# Patient Record
Sex: FEMALE | Race: ASIAN | NOT HISPANIC OR LATINO | URBAN - METROPOLITAN AREA
[De-identification: names, ages, dates, MRNs, and addresses within clinical notes are randomized per-mention and may not be internally consistent; named-entity substitution may affect disease eponyms.]

---

## 2020-11-04 ENCOUNTER — EMERGENCY (EMERGENCY)
Facility: HOSPITAL | Age: 60
LOS: 1 days | Discharge: HOME | End: 2020-11-04
Attending: EMERGENCY MEDICINE | Admitting: EMERGENCY MEDICINE
Payer: MEDICAID

## 2020-11-04 VITALS
DIASTOLIC BLOOD PRESSURE: 75 MMHG | OXYGEN SATURATION: 100 % | RESPIRATION RATE: 16 BRPM | SYSTOLIC BLOOD PRESSURE: 136 MMHG | TEMPERATURE: 100 F | HEART RATE: 77 BPM

## 2020-11-04 PROCEDURE — 99282 EMERGENCY DEPT VISIT SF MDM: CPT

## 2020-11-04 NOTE — ED PROVIDER NOTE - OBJECTIVE STATEMENT
60F h/o bipolar disorder bibems after  called 911.  Pt was recently discharged from [facility]; she was sent to reside in a new apartment.  Pt was upset and attempting to return to her old apartment.  Case workers told her she couldn't, and she started walking towards the street/traffic, so  called 911.  Pt denies SI/HI.  States she was walking towards the street and was planning to wait for them to call her a cab to the old apartment.  Denies acute medical complaints.

## 2020-11-04 NOTE — ED PROVIDER NOTE - PATIENT PORTAL LINK FT
You can access the FollowMyHealth Patient Portal offered by [facility] by registering at the following website: [email or web address]. By joining [facility]’s FollowMyHealth portal, you will also be able to view your health information using other applications (apps) compatible with our system.

## 2020-11-04 NOTE — ED ADULT NURSE NOTE - OBJECTIVE STATEMENT
Pt arrived to  with EMS, recently discharged from [facility] today. As per EMS, therapist called because pt refused to go to new residence, requested to be transferred to mother's home, then decided to walk in the street against traffic. As per pt, she denies si/hi and auditory hallucinations, claims she wanted to go to her mother's home. Pt denies any medical discomfort. respirations even/unlabored, nad noted, belongings collected and secured. will continue to monitor

## 2020-11-04 NOTE — ED PROVIDER NOTE - NSFOLLOWUPINSTRUCTIONS_ED_ALL_ED_FT
Please follow up with your outpatient providers as scheduled.    Return to the emergency department for any worsening symptoms.

## 2020-11-04 NOTE — ED ADULT TRIAGE NOTE - CHIEF COMPLAINT QUOTE
Pt d/c from [facility] to a new apartment. Pt was frustrated that she couldn't go back to her old apartment and walked towards traffic so  called 911. H/o Bipolar.    Case workers:  Abhilash: [phone]  Nicky: [phone]

## 2020-11-04 NOTE — ED PROVIDER NOTE - CLINICAL SUMMARY MEDICAL DECISION MAKING FREE TEXT BOX
- pt unhappy with new housing  - will have   call case workers for collateral and to figure out housing situation

## 2020-11-04 NOTE — ED ADULT NURSE NOTE - CHIEF COMPLAINT QUOTE
Pt d/c from [facility] to a new apartment. Pt was frustrated that she couldn't go back to her old apartment and walked towards traffic so  called 911. H/o Bipolar.    Case workers:  Abhilash: [phone]  Nicky: [phone]
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
## 2021-12-20 ENCOUNTER — INPATIENT (INPATIENT)
Facility: HOSPITAL | Age: 61
LOS: 16 days | Discharge: NURSING, REHAB OR LONG-TERM CARE | DRG: 689 | End: 2022-01-06
Attending: HOSPITALIST | Admitting: HOSPITALIST
Payer: MEDICAID

## 2021-12-20 VITALS
DIASTOLIC BLOOD PRESSURE: 70 MMHG | OXYGEN SATURATION: 90 % | TEMPERATURE: 98 F | RESPIRATION RATE: 18 BRPM | HEART RATE: 86 BPM | SYSTOLIC BLOOD PRESSURE: 144 MMHG

## 2021-12-20 LAB
ALBUMIN SERPL ELPH-MCNC: 2.5 G/DL — LOW (ref 3.5–5)
ALP SERPL-CCNC: 90 U/L — SIGNIFICANT CHANGE UP (ref 40–120)
ALT FLD-CCNC: 6 U/L DA — LOW (ref 10–60)
ANION GAP SERPL CALC-SCNC: 8 MMOL/L — SIGNIFICANT CHANGE UP (ref 5–17)
APPEARANCE UR: CLEAR — SIGNIFICANT CHANGE UP
APTT BLD: 29.1 SEC — SIGNIFICANT CHANGE UP (ref 27.5–35.5)
AST SERPL-CCNC: 5 U/L — LOW (ref 10–40)
BACTERIA # UR AUTO: ABNORMAL /HPF
BASOPHILS # BLD AUTO: 0 K/UL — SIGNIFICANT CHANGE UP (ref 0–0.2)
BASOPHILS NFR BLD AUTO: 0 % — SIGNIFICANT CHANGE UP (ref 0–2)
BILIRUB SERPL-MCNC: 0.3 MG/DL — SIGNIFICANT CHANGE UP (ref 0.2–1.2)
BILIRUB UR-MCNC: NEGATIVE — SIGNIFICANT CHANGE UP
BUN SERPL-MCNC: 67 MG/DL — HIGH (ref 7–18)
CALCIUM SERPL-MCNC: 9.1 MG/DL — SIGNIFICANT CHANGE UP (ref 8.4–10.5)
CHLORIDE SERPL-SCNC: 105 MMOL/L — SIGNIFICANT CHANGE UP (ref 96–108)
CO2 SERPL-SCNC: 23 MMOL/L — SIGNIFICANT CHANGE UP (ref 22–31)
COLOR SPEC: YELLOW — SIGNIFICANT CHANGE UP
CREAT SERPL-MCNC: 2.69 MG/DL — HIGH (ref 0.5–1.3)
DIFF PNL FLD: ABNORMAL
EOSINOPHIL # BLD AUTO: 0 K/UL — SIGNIFICANT CHANGE UP (ref 0–0.5)
EOSINOPHIL NFR BLD AUTO: 0 % — SIGNIFICANT CHANGE UP (ref 0–6)
EPI CELLS # UR: SIGNIFICANT CHANGE UP /HPF
ETHANOL SERPL-MCNC: <3 MG/DL — SIGNIFICANT CHANGE UP (ref 0–10)
GLUCOSE SERPL-MCNC: 158 MG/DL — HIGH (ref 70–99)
GLUCOSE UR QL: NEGATIVE — SIGNIFICANT CHANGE UP
HCT VFR BLD CALC: 32.3 % — LOW (ref 34.5–45)
HGB BLD-MCNC: 10.3 G/DL — LOW (ref 11.5–15.5)
INR BLD: 1.19 RATIO — HIGH (ref 0.88–1.16)
KETONES UR-MCNC: NEGATIVE — SIGNIFICANT CHANGE UP
LEUKOCYTE ESTERASE UR-ACNC: ABNORMAL
LYMPHOCYTES # BLD AUTO: 0.4 K/UL — LOW (ref 1–3.3)
LYMPHOCYTES # BLD AUTO: 3 % — LOW (ref 13–44)
MCHC RBC-ENTMCNC: 29.8 PG — SIGNIFICANT CHANGE UP (ref 27–34)
MCHC RBC-ENTMCNC: 31.9 GM/DL — LOW (ref 32–36)
MCV RBC AUTO: 93.4 FL — SIGNIFICANT CHANGE UP (ref 80–100)
MONOCYTES # BLD AUTO: 0.67 K/UL — SIGNIFICANT CHANGE UP (ref 0–0.9)
MONOCYTES NFR BLD AUTO: 5 % — SIGNIFICANT CHANGE UP (ref 2–14)
NEUTROPHILS # BLD AUTO: 11.93 K/UL — HIGH (ref 1.8–7.4)
NEUTROPHILS NFR BLD AUTO: 72 % — SIGNIFICANT CHANGE UP (ref 43–77)
NITRITE UR-MCNC: NEGATIVE — SIGNIFICANT CHANGE UP
PH UR: 6 — SIGNIFICANT CHANGE UP (ref 5–8)
PLATELET # BLD AUTO: 360 K/UL — SIGNIFICANT CHANGE UP (ref 150–400)
POTASSIUM SERPL-MCNC: 4.4 MMOL/L — SIGNIFICANT CHANGE UP (ref 3.5–5.3)
POTASSIUM SERPL-SCNC: 4.4 MMOL/L — SIGNIFICANT CHANGE UP (ref 3.5–5.3)
PROT SERPL-MCNC: 8.6 G/DL — HIGH (ref 6–8.3)
PROT UR-MCNC: 30 MG/DL
PROTHROM AB SERPL-ACNC: 14.1 SEC — HIGH (ref 10.6–13.6)
RBC # BLD: 3.46 M/UL — LOW (ref 3.8–5.2)
RBC # FLD: 11.9 % — SIGNIFICANT CHANGE UP (ref 10.3–14.5)
RBC CASTS # UR COMP ASSIST: ABNORMAL /HPF (ref 0–2)
SARS-COV-2 RNA SPEC QL NAA+PROBE: SIGNIFICANT CHANGE UP
SODIUM SERPL-SCNC: 136 MMOL/L — SIGNIFICANT CHANGE UP (ref 135–145)
SP GR SPEC: 1 — LOW (ref 1.01–1.02)
UROBILINOGEN FLD QL: NEGATIVE — SIGNIFICANT CHANGE UP
WBC # BLD: 13.41 K/UL — HIGH (ref 3.8–10.5)
WBC # FLD AUTO: 13.41 K/UL — HIGH (ref 3.8–10.5)
WBC UR QL: ABNORMAL /HPF (ref 0–5)

## 2021-12-20 PROCEDURE — 71045 X-RAY EXAM CHEST 1 VIEW: CPT | Mod: 26

## 2021-12-20 PROCEDURE — 99223 1ST HOSP IP/OBS HIGH 75: CPT

## 2021-12-20 PROCEDURE — 99285 EMERGENCY DEPT VISIT HI MDM: CPT

## 2021-12-20 PROCEDURE — 70450 CT HEAD/BRAIN W/O DYE: CPT | Mod: 26,MA

## 2021-12-20 RX ORDER — SODIUM CHLORIDE 9 MG/ML
1000 INJECTION INTRAMUSCULAR; INTRAVENOUS; SUBCUTANEOUS ONCE
Refills: 0 | Status: COMPLETED | OUTPATIENT
Start: 2021-12-20 | End: 2021-12-20

## 2021-12-20 RX ORDER — CEFTRIAXONE 500 MG/1
1000 INJECTION, POWDER, FOR SOLUTION INTRAMUSCULAR; INTRAVENOUS ONCE
Refills: 0 | Status: COMPLETED | OUTPATIENT
Start: 2021-12-20 | End: 2021-12-20

## 2021-12-20 RX ORDER — HALOPERIDOL DECANOATE 100 MG/ML
5 INJECTION INTRAMUSCULAR ONCE
Refills: 0 | Status: COMPLETED | OUTPATIENT
Start: 2021-12-20 | End: 2021-12-20

## 2021-12-20 RX ADMIN — HALOPERIDOL DECANOATE 5 MILLIGRAM(S): 100 INJECTION INTRAMUSCULAR at 20:59

## 2021-12-20 RX ADMIN — Medication 2 MILLIGRAM(S): at 20:59

## 2021-12-20 NOTE — ED ADULT NURSE NOTE - ED STAT RN HANDOFF DETAILS 2
Pt remains stable, AOX1, no s/s of any distress noted. IV line in place, IV fluids infusing as per orders, no signs of infiltration noted. Tolerating diet. VS WNL. Call bell in reach, bed in lowest position. Safety maintained, hourly rounding performed. Endorsed to nurse Les Kaufman

## 2021-12-20 NOTE — ED ADULT NURSE NOTE - NSIMPLEMENTINTERV_GEN_ALL_ED
Implemented All Fall Risk Interventions:  [place] to call system. Call bell, personal items and telephone within reach. Instruct patient to call for assistance. Room bathroom lighting operational. Non-slip footwear when patient is off stretcher. Physically safe environment: no spills, clutter or unnecessary equipment. Stretcher in lowest position, wheels locked, appropriate side rails in place. Provide visual cue, wrist band, yellow gown, etc. Monitor gait and stability. Monitor for mental status changes and reorient to person, place, and time. Review medications for side effects contributing to fall risk. Reinforce activity limits and safety measures with patient and family.

## 2021-12-20 NOTE — ED PROVIDER NOTE - ENMT, MLM
Stress test at [facility]-- Do not take Coreg the night before and the morning of test  No medication changes  Will change BNN appt to same day as stress test Airway patent, Nasal mucosa clear. Mouth with normal mucosa. Throat has no vesicles, no oropharyngeal exudates and uvula is midline.

## 2021-12-20 NOTE — H&P ADULT - PROBLEM SELECTOR PLAN 2
Pt with + uA, wbc of 13  f/u urine cultures, blood cultures  continue rocephin  iv fluids  monitor mental status

## 2021-12-20 NOTE — H&P ADULT - PROBLEM SELECTOR PLAN 3
pt with cr of 2.69, unknown baseline  likely pre-renal in setting of poor po intake at home as per   f/u urine studies  iv fluids  moitor bmp  can consider Renal/ bladder ultrasound

## 2021-12-20 NOTE — ED PROVIDER NOTE - CLINICAL SUMMARY MEDICAL DECISION MAKING FREE TEXT BOX
Patient presenting with ams, combative on arrival, requiring sedation for patient safety. will obtain lab, ct head, ua. r/o infection, ich. admit

## 2021-12-20 NOTE — H&P ADULT - PROBLEM SELECTOR PLAN 4
hx of bipolar disorder on multiple meds  hx of non compliance, but now workers of the program she lives in are giving her the medications  continue home meds once able to tolerate po
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
## 2021-12-20 NOTE — H&P ADULT - PROBLEM SELECTOR PLAN 1
pt with ams, progressively worsening for 1 week  has hx of bipolar disorder and hospitalizations due changes in behavior (last one in [place])  CTH negative for acute pathology, no focal neurological findings other than ams, but exam difficult to assess in current status.   likely due to infection (+UA, incontinence)  Continue abx  r/o metabolic changes, f/u tsh  IV fluids  f/u cultures  NPO for now, monitor mentation, advance diet as tolerated  aspiration precautions

## 2021-12-20 NOTE — ED ADULT NURSE NOTE - ED STAT RN HANDOFF DETAILS 3
Report given to Maurice CADET.  Pt Alert, stable, cooperative. In no visible distress.  IV site flushing, no signs/symptoms of infiltration/inflammation

## 2021-12-20 NOTE — H&P ADULT - PROBLEM SELECTOR PLAN 6
pmh of htn on amlodipine, lisinopril, and toprol  continue home meds once able to tolerate po  monitor bp   IV medication as needed for now

## 2021-12-20 NOTE — H&P ADULT - NSHPPHYSICALEXAM_GEN_ALL_CORE
GENERAL: lethargic, disheveled   HEAD:  Atraumatic, Normocephalic  EYES: EOMI, PERRLA, conjunctiva and sclera clear  ENMT: dry mucous membranes, poor dentition, no lesions.   NECK: Supple, normal appearance, No JVD; Normal thyroid; Trachea midline  NERVOUS SYSTEM:  Alert & Oriented X0,  moving extremities, unable to properly assess neurological status  CHEST/LUNG: Lungs clear to auscultation bilaterally, No rales, rhonchi, wheezing   HEART: Regular rate and rhythm; + systolic murmur  ABDOMEN: Soft, Nontender, Nondistended; Bowel sounds present  EXTREMITIES:  2+ Peripheral Pulses, No clubbing, cyanosis, or edema  LYMPH: No lymphadenopathy noted  SKIN: No rashes or lesions;  Good capillary refill

## 2021-12-20 NOTE — H&P ADULT - ATTENDING COMMENTS
Vital Signs Last 24 Hrs  T(C): 36.7 (20 Dec 2021 19:55), Max: 36.7 (20 Dec 2021 19:55)  T(F): 98 (20 Dec 2021 19:55), Max: 98 (20 Dec 2021 19:55)  HR: 86 (20 Dec 2021 19:55) (86 - 86)  BP: 144/70 (20 Dec 2021 19:55) (144/70 - 144/70)  BP(mean): --  RR: 18 (20 Dec 2021 19:55) (18 - 18)  SpO2: 90% (20 Dec 2021 19:55) (90% - 90%) Pt seen at bedside  Case discussed with MAR.  61 year old woman resident of an Assisted Living Facility  with no significant medical problems here on account on unwitnessed changes to her mental status noted today including urine incontinence, weakness and anorexia. While alert, she appeared confused and unable to provide accurate responses to questions asked.  No reported fevers.    Vital Signs Last 24 Hrs  T(C): 36.7 (20 Dec 2021 19:55), Max: 36.7 (20 Dec 2021 19:55)  T(F): 98 (20 Dec 2021 19:55), Max: 98 (20 Dec 2021 19:55)  HR: 86 (20 Dec 2021 19:55) (86 - 86)  BP: 144/70 (20 Dec 2021 19:55) (144/70 - 144/70)  RR: 18 (20 Dec 2021 19:55) (18 - 18)  SpO2: 90% (20 Dec 2021 19:55) (90% - 90%)    Labs                         10.3   13.41 )-----------( 360      ( 20 Dec 2021 21:49 )             32.3     12-20    136  |  105  |  67<H>  ----------------------------<  158<H>  4.4   |  23  |  2.69<H>    Ca    9.1      20 Dec 2021 21:49    TPro  8.6<H>  /  Alb  2.5<L>  /  TBili  0.3  /  DBili  x   /  AST  5<L>  /  ALT  6<L>  /  AlkPhos  90  12-20    PT/INR - ( 20 Dec 2021 21:49 )   PT: 14.1 sec;   INR: 1.19 ratio    PTT - ( 20 Dec 2021 21:49 )  PTT:29.1 sec    Urinalysis Basic - ( 20 Dec 2021 22:52 )    Color: Yellow / Appearance: Clear / S.005 / pH: x  Gluc: x / Ketone: Negative  / Bili: Negative / Urobili: Negative   Blood: x / Protein: 30 mg/dL / Nitrite: Negative   Leuk Esterase: Moderate / RBC: 5-10 /HPF / WBC 11-25 /HPF   Sq Epi: x / Non Sq Epi: Few /HPF / Bacteria: Few /HPF    CTH - unremarkable  COVID -ve    Impression   - Acute encephalopathy   Most likely   1) Septic encephalopathy with acute presentation of symptoms and +ve UA  c/w Acute UTI   2) r/o hypoxic ischemic encephalopathy like ischemic CVA - less likely   CT head negative  3) Toxic metabolic encephalopathy with renal insufficiency- baseline renal function unknown vs psychotic meds for bipolar disorder    Plan   - Admit to Medicine   - Sepsis work up including urine and blood cxs  - Empiric IV antibiotics with ceftriaxone 1g daily   - Judicious IVF hydration with isotonic fluids @125cc/hour  - PT evaluation   - Fall risk with precaution  - Re-evaluate home meds once able  - re-evaluate with neurological exams in AM for focal deficits +/- interval CT head.  - re-evaluate chemistry- if no improvement-> renal consult with urine chem studies and renal U/S

## 2021-12-20 NOTE — H&P ADULT - ASSESSMENT
Pt is a 60 y/o F with pmh of Bipolar disorder, HTN, HLD, Hypothyroidism, who presented to the ED BIBEMS due to AMS. Being treated for UTI

## 2021-12-20 NOTE — ED PROVIDER NOTE - OBJECTIVE STATEMENT
61 y.o w/ pmh of bipolar presenting for ams. per ems, her hha called due to worsening ams. patient hha did not come with patient. patient aox1 on exam. denies complaints. 61 y.o w/ pmh of bipolar presenting for ams. patient aox1, unable to give answer to ROS, patient  called, ([phone],  yaz), per patient CM, patient has been noting difficulty ambulating. been more confused, eating less the past week. noted that today, when she checked on patient, the patient was sitting in her own urine which is not her baseline.

## 2021-12-21 DIAGNOSIS — E78.5 HYPERLIPIDEMIA, UNSPECIFIED: ICD-10-CM

## 2021-12-21 DIAGNOSIS — N17.9 ACUTE KIDNEY FAILURE, UNSPECIFIED: ICD-10-CM

## 2021-12-21 DIAGNOSIS — E03.9 HYPOTHYROIDISM, UNSPECIFIED: ICD-10-CM

## 2021-12-21 DIAGNOSIS — D64.9 ANEMIA, UNSPECIFIED: ICD-10-CM

## 2021-12-21 DIAGNOSIS — F31.9 BIPOLAR DISORDER, UNSPECIFIED: ICD-10-CM

## 2021-12-21 DIAGNOSIS — Z29.9 ENCOUNTER FOR PROPHYLACTIC MEASURES, UNSPECIFIED: ICD-10-CM

## 2021-12-21 DIAGNOSIS — I10 ESSENTIAL (PRIMARY) HYPERTENSION: ICD-10-CM

## 2021-12-21 DIAGNOSIS — G93.40 ENCEPHALOPATHY, UNSPECIFIED: ICD-10-CM

## 2021-12-21 DIAGNOSIS — N39.0 URINARY TRACT INFECTION, SITE NOT SPECIFIED: ICD-10-CM

## 2021-12-21 DIAGNOSIS — F03.90 UNSPECIFIED DEMENTIA WITHOUT BEHAVIORAL DISTURBANCE: ICD-10-CM

## 2021-12-21 LAB
A1C WITH ESTIMATED AVERAGE GLUCOSE RESULT: 5.5 % — SIGNIFICANT CHANGE UP (ref 4–5.6)
ALBUMIN SERPL ELPH-MCNC: 2.4 G/DL — LOW (ref 3.5–5)
ALP SERPL-CCNC: 83 U/L — SIGNIFICANT CHANGE UP (ref 40–120)
ALT FLD-CCNC: 6 U/L DA — LOW (ref 10–60)
ANION GAP SERPL CALC-SCNC: 7 MMOL/L — SIGNIFICANT CHANGE UP (ref 5–17)
ANISOCYTOSIS BLD QL: SLIGHT — SIGNIFICANT CHANGE UP
AST SERPL-CCNC: 7 U/L — LOW (ref 10–40)
BASOPHILS # BLD AUTO: 0 K/UL — SIGNIFICANT CHANGE UP (ref 0–0.2)
BASOPHILS NFR BLD AUTO: 0 % — SIGNIFICANT CHANGE UP (ref 0–2)
BILIRUB SERPL-MCNC: 0.2 MG/DL — SIGNIFICANT CHANGE UP (ref 0.2–1.2)
BUN SERPL-MCNC: 59 MG/DL — HIGH (ref 7–18)
CALCIUM SERPL-MCNC: 9.2 MG/DL — SIGNIFICANT CHANGE UP (ref 8.4–10.5)
CHLORIDE SERPL-SCNC: 112 MMOL/L — HIGH (ref 96–108)
CHOLEST SERPL-MCNC: 118 MG/DL — SIGNIFICANT CHANGE UP
CO2 SERPL-SCNC: 24 MMOL/L — SIGNIFICANT CHANGE UP (ref 22–31)
CREAT SERPL-MCNC: 1.89 MG/DL — HIGH (ref 0.5–1.3)
EOSINOPHIL # BLD AUTO: 0 K/UL — SIGNIFICANT CHANGE UP (ref 0–0.5)
EOSINOPHIL NFR BLD AUTO: 0 % — SIGNIFICANT CHANGE UP (ref 0–6)
ESTIMATED AVERAGE GLUCOSE: 111 MG/DL — SIGNIFICANT CHANGE UP (ref 68–114)
FERRITIN SERPL-MCNC: 365 NG/ML — HIGH (ref 15–150)
GLUCOSE SERPL-MCNC: 128 MG/DL — HIGH (ref 70–99)
HCT VFR BLD CALC: 30.8 % — LOW (ref 34.5–45)
HCV AB S/CO SERPL IA: 0.12 S/CO — SIGNIFICANT CHANGE UP (ref 0–0.99)
HCV AB SERPL-IMP: SIGNIFICANT CHANGE UP
HDLC SERPL-MCNC: 28 MG/DL — LOW
HGB BLD-MCNC: 9.9 G/DL — LOW (ref 11.5–15.5)
HYPOCHROMIA BLD QL: SLIGHT — SIGNIFICANT CHANGE UP
IRON SATN MFR SERPL: 17 UG/DL — LOW (ref 40–150)
IRON SATN MFR SERPL: 8 % — LOW (ref 15–50)
LIPID PNL WITH DIRECT LDL SERPL: 62 MG/DL — SIGNIFICANT CHANGE UP
LYMPHOCYTES # BLD AUTO: 0.86 K/UL — LOW (ref 1–3.3)
LYMPHOCYTES # BLD AUTO: 5 % — LOW (ref 13–44)
MAGNESIUM SERPL-MCNC: 2.3 MG/DL — SIGNIFICANT CHANGE UP (ref 1.6–2.6)
MANUAL SMEAR VERIFICATION: SIGNIFICANT CHANGE UP
MCHC RBC-ENTMCNC: 30.7 PG — SIGNIFICANT CHANGE UP (ref 27–34)
MCHC RBC-ENTMCNC: 32.1 GM/DL — SIGNIFICANT CHANGE UP (ref 32–36)
MCV RBC AUTO: 95.4 FL — SIGNIFICANT CHANGE UP (ref 80–100)
MICROCYTES BLD QL: SLIGHT — SIGNIFICANT CHANGE UP
MONOCYTES # BLD AUTO: 1.72 K/UL — HIGH (ref 0–0.9)
MONOCYTES NFR BLD AUTO: 10 % — SIGNIFICANT CHANGE UP (ref 2–14)
NEUTROPHILS # BLD AUTO: 14.63 K/UL — HIGH (ref 1.8–7.4)
NEUTROPHILS NFR BLD AUTO: 74 % — SIGNIFICANT CHANGE UP (ref 43–77)
NEUTS BAND # BLD: 11 % — HIGH (ref 0–8)
NON HDL CHOLESTEROL: 90 MG/DL — SIGNIFICANT CHANGE UP
NRBC # BLD: 0 /100 — SIGNIFICANT CHANGE UP (ref 0–0)
PHOSPHATE SERPL-MCNC: 3.6 MG/DL — SIGNIFICANT CHANGE UP (ref 2.5–4.5)
PLAT MORPH BLD: NORMAL — SIGNIFICANT CHANGE UP
PLATELET # BLD AUTO: 346 K/UL — SIGNIFICANT CHANGE UP (ref 150–400)
POIKILOCYTOSIS BLD QL AUTO: SLIGHT — SIGNIFICANT CHANGE UP
POLYCHROMASIA BLD QL SMEAR: SLIGHT — SIGNIFICANT CHANGE UP
POTASSIUM SERPL-MCNC: 4.8 MMOL/L — SIGNIFICANT CHANGE UP (ref 3.5–5.3)
POTASSIUM SERPL-SCNC: 4.8 MMOL/L — SIGNIFICANT CHANGE UP (ref 3.5–5.3)
PROT SERPL-MCNC: 7.7 G/DL — SIGNIFICANT CHANGE UP (ref 6–8.3)
RBC # BLD: 3.23 M/UL — LOW (ref 3.8–5.2)
RBC # FLD: 12.2 % — SIGNIFICANT CHANGE UP (ref 10.3–14.5)
RBC BLD AUTO: ABNORMAL
SODIUM SERPL-SCNC: 143 MMOL/L — SIGNIFICANT CHANGE UP (ref 135–145)
SPHEROCYTES BLD QL SMEAR: SLIGHT — SIGNIFICANT CHANGE UP
TIBC SERPL-MCNC: 219 UG/DL — LOW (ref 250–450)
TRIGL SERPL-MCNC: 139 MG/DL — SIGNIFICANT CHANGE UP
TSH SERPL-MCNC: 0.07 UU/ML — LOW (ref 0.34–4.82)
UIBC SERPL-MCNC: 202 UG/DL — SIGNIFICANT CHANGE UP (ref 110–370)
WBC # BLD: 17.21 K/UL — HIGH (ref 3.8–10.5)
WBC # FLD AUTO: 17.21 K/UL — HIGH (ref 3.8–10.5)

## 2021-12-21 PROCEDURE — 99233 SBSQ HOSP IP/OBS HIGH 50: CPT

## 2021-12-21 RX ORDER — HEPARIN SODIUM 5000 [USP'U]/ML
5000 INJECTION INTRAVENOUS; SUBCUTANEOUS EVERY 8 HOURS
Refills: 0 | Status: DISCONTINUED | OUTPATIENT
Start: 2021-12-21 | End: 2022-01-01

## 2021-12-21 RX ORDER — ASPIRIN/CALCIUM CARB/MAGNESIUM 324 MG
81 TABLET ORAL DAILY
Refills: 0 | Status: DISCONTINUED | OUTPATIENT
Start: 2021-12-22 | End: 2022-01-06

## 2021-12-21 RX ORDER — METOPROLOL TARTRATE 50 MG
100 TABLET ORAL DAILY
Refills: 0 | Status: DISCONTINUED | OUTPATIENT
Start: 2021-12-22 | End: 2022-01-05

## 2021-12-21 RX ORDER — BENZTROPINE MESYLATE 1 MG
2 TABLET ORAL DAILY
Refills: 0 | Status: DISCONTINUED | OUTPATIENT
Start: 2021-12-21 | End: 2021-12-21

## 2021-12-21 RX ORDER — DIVALPROEX SODIUM 500 MG/1
250 TABLET, DELAYED RELEASE ORAL DAILY
Refills: 0 | Status: DISCONTINUED | OUTPATIENT
Start: 2021-12-22 | End: 2022-01-06

## 2021-12-21 RX ORDER — ATORVASTATIN CALCIUM 80 MG/1
40 TABLET, FILM COATED ORAL AT BEDTIME
Refills: 0 | Status: DISCONTINUED | OUTPATIENT
Start: 2021-12-21 | End: 2022-01-06

## 2021-12-21 RX ORDER — LEVOTHYROXINE SODIUM 125 MCG
150 TABLET ORAL AT BEDTIME
Refills: 0 | Status: DISCONTINUED | OUTPATIENT
Start: 2021-12-21 | End: 2021-12-21

## 2021-12-21 RX ORDER — ASPIRIN/CALCIUM CARB/MAGNESIUM 324 MG
300 TABLET ORAL DAILY
Refills: 0 | Status: DISCONTINUED | OUTPATIENT
Start: 2021-12-21 | End: 2021-12-21

## 2021-12-21 RX ORDER — FLUOXETINE HCL 10 MG
60 CAPSULE ORAL DAILY
Refills: 0 | Status: DISCONTINUED | OUTPATIENT
Start: 2021-12-22 | End: 2022-01-06

## 2021-12-21 RX ORDER — AMLODIPINE BESYLATE 2.5 MG/1
10 TABLET ORAL DAILY
Refills: 0 | Status: DISCONTINUED | OUTPATIENT
Start: 2021-12-21 | End: 2022-01-05

## 2021-12-21 RX ORDER — DIVALPROEX SODIUM 500 MG/1
500 TABLET, DELAYED RELEASE ORAL AT BEDTIME
Refills: 0 | Status: DISCONTINUED | OUTPATIENT
Start: 2021-12-21 | End: 2022-01-06

## 2021-12-21 RX ORDER — VALPROIC ACID (AS SODIUM SALT) 250 MG/5ML
250 SOLUTION, ORAL ORAL THREE TIMES A DAY
Refills: 0 | Status: DISCONTINUED | OUTPATIENT
Start: 2021-12-21 | End: 2021-12-21

## 2021-12-21 RX ORDER — RISPERIDONE 4 MG/1
1 TABLET ORAL
Refills: 0 | Status: DISCONTINUED | OUTPATIENT
Start: 2021-12-22 | End: 2022-01-05

## 2021-12-21 RX ORDER — BENZTROPINE MESYLATE 1 MG
2 TABLET ORAL DAILY
Refills: 0 | Status: DISCONTINUED | OUTPATIENT
Start: 2021-12-22 | End: 2022-01-06

## 2021-12-21 RX ORDER — SODIUM CHLORIDE 9 MG/ML
1000 INJECTION INTRAMUSCULAR; INTRAVENOUS; SUBCUTANEOUS
Refills: 0 | Status: COMPLETED | OUTPATIENT
Start: 2021-12-21 | End: 2021-12-21

## 2021-12-21 RX ORDER — CEFTRIAXONE 500 MG/1
1000 INJECTION, POWDER, FOR SOLUTION INTRAMUSCULAR; INTRAVENOUS EVERY 24 HOURS
Refills: 0 | Status: COMPLETED | OUTPATIENT
Start: 2021-12-21 | End: 2021-12-23

## 2021-12-21 RX ADMIN — HEPARIN SODIUM 5000 UNIT(S): 5000 INJECTION INTRAVENOUS; SUBCUTANEOUS at 22:47

## 2021-12-21 RX ADMIN — ATORVASTATIN CALCIUM 40 MILLIGRAM(S): 80 TABLET, FILM COATED ORAL at 22:46

## 2021-12-21 RX ADMIN — Medication 26.25 MILLIGRAM(S): at 06:15

## 2021-12-21 RX ADMIN — CEFTRIAXONE 100 MILLIGRAM(S): 500 INJECTION, POWDER, FOR SOLUTION INTRAMUSCULAR; INTRAVENOUS at 00:31

## 2021-12-21 RX ADMIN — SODIUM CHLORIDE 125 MILLILITER(S): 9 INJECTION INTRAMUSCULAR; INTRAVENOUS; SUBCUTANEOUS at 22:45

## 2021-12-21 RX ADMIN — DIVALPROEX SODIUM 500 MILLIGRAM(S): 500 TABLET, DELAYED RELEASE ORAL at 22:46

## 2021-12-21 RX ADMIN — SODIUM CHLORIDE 125 MILLILITER(S): 9 INJECTION INTRAMUSCULAR; INTRAVENOUS; SUBCUTANEOUS at 08:27

## 2021-12-21 RX ADMIN — CEFTRIAXONE 100 MILLIGRAM(S): 500 INJECTION, POWDER, FOR SOLUTION INTRAMUSCULAR; INTRAVENOUS at 08:27

## 2021-12-21 RX ADMIN — SODIUM CHLORIDE 1000 MILLILITER(S): 9 INJECTION INTRAMUSCULAR; INTRAVENOUS; SUBCUTANEOUS at 00:31

## 2021-12-21 RX ADMIN — HEPARIN SODIUM 5000 UNIT(S): 5000 INJECTION INTRAVENOUS; SUBCUTANEOUS at 06:15

## 2021-12-21 NOTE — SWALLOW BEDSIDE ASSESSMENT ADULT - ORAL PHASE
Decreased anterior-posterior movement of the bolus anterior spillage of thin liquids during cup sip/Decreased anterior-posterior movement of the bolus Within functional limits oral residue visualized post swallow/Decreased anterior-posterior movement of the bolus

## 2021-12-21 NOTE — PROGRESS NOTE ADULT - PROBLEM SELECTOR PLAN 5
pt with hgb of 10.3, unknown baseline  f/u anemia panel  monitor h/h hx of bipolar disorder on multiple meds  hx of non compliance, but now workers of the program she lives in are giving her the medications  continue divalproex 250mg QD 500mg QHS, prozac 60mg QD, risperdal 1mg BID home dose

## 2021-12-21 NOTE — PROGRESS NOTE ADULT - PROBLEM SELECTOR PLAN 6
pmh of htn on amlodipine, lisinopril, and toprol  continue home meds once able to tolerate po  monitor bp   IV medication as needed for now pt with hgb of 10.3, unknown baseline  f/u anemia panel  monitor h/h

## 2021-12-21 NOTE — PROGRESS NOTE ADULT - PROBLEM SELECTOR PLAN 4
hx of bipolar disorder on multiple meds  hx of non compliance, but now workers of the program she lives in are giving her the medications  continue home meds once able to tolerate po pt with cr of 2.69, unknown baseline   likely pre-renal in setting of poor po intake at home as per   f/u urine studies  repeat Cr 1.89   continue IVF   BMP in AM   can consider Renal/ bladder ultrasound if AYAN worsen  hold off lisinopril in the setting of AYAN

## 2021-12-21 NOTE — PROGRESS NOTE ADULT - PROBLEM SELECTOR PLAN 3
pt with cr of 2.69, unknown baseline  likely pre-renal in setting of poor po intake at home as per   f/u urine studies  iv fluids  moitor bmp  can consider Renal/ bladder ultrasound brother reported that pt is dementia which is getting worse now x 2 weeks   supportive care  due to physical and mental limitation due to dementia progression pt may need a placement  pending PT eval

## 2021-12-21 NOTE — PROGRESS NOTE ADULT - PROBLEM SELECTOR PLAN 1
pt with ams, progressively worsening for 1 week  has hx of bipolar disorder and hospitalizations due changes in behavior (last one in [place])  CTH negative for acute pathology, no focal neurological findings other than ams, but exam difficult to assess in current status.   likely due to infection (+UA, incontinence)  Continue abx  r/o metabolic changes, f/u tsh  IV fluids  f/u cultures  NPO for now, monitor mentation, advance diet as tolerated  aspiration precautions pt with ams, progressively worsening for 1 week  has hx of bipolar disorder and hospitalizations due changes in behavior (last one in [place])  CTH negative for acute pathology, no focal neurological findings other than ams, but exam difficult to assess in current status.   likely due to infection (+UA, incontinence) and also worsening of dementia   Continue ceftriaxone for now and f/u urine culture   IV fluids

## 2021-12-21 NOTE — PROGRESS NOTE ADULT - SUBJECTIVE AND OBJECTIVE BOX
NP Note discussed with  Primary Attending    Patient is a 61y old  Female who presents with a chief complaint of AMS (20 Dec 2021 23:34)      INTERVAL HPI/OVERNIGHT EVENTS: no new complaints    MEDICATIONS  (STANDING):  aspirin Suppository 300 milliGRAM(s) Rectal daily  benztropine Injectable 2 milliGRAM(s) IV Push daily  cefTRIAXone   IVPB 1000 milliGRAM(s) IV Intermittent every 24 hours  heparin   Injectable 5000 Unit(s) SubCutaneous every 8 hours  levothyroxine Injectable 150 MICROGram(s) IV Push at bedtime  sodium chloride 0.9%. 1000 milliLiter(s) (125 mL/Hr) IV Continuous <Continuous>  valproate sodium IVPB 250 milliGRAM(s) IV Intermittent three times a day    MEDICATIONS  (PRN):      __________________________________________________  REVIEW OF SYSTEMS:  AO x 1 herself       Vital Signs Last 24 Hrs  T(C): 36.8 (21 Dec 2021 08:33), Max: 36.8 (21 Dec 2021 08:33)  T(F): 98.2 (21 Dec 2021 08:33), Max: 98.2 (21 Dec 2021 08:33)  HR: 94 (21 Dec 2021 08:33) (71 - 94)  BP: 165/77 (21 Dec 2021 08:33) (144/70 - 169/76)  BP(mean): --  RR: 20 (21 Dec 2021 08:33) (18 - 20)  SpO2: 99% (21 Dec 2021 08:33) (90% - 99%)    ________________________________________________  PHYSICAL EXAM:  GENERAL: NAD  HEENT: Normocephalic;  conjunctivae and sclerae clear; moist mucous membranes;   NECK : supple  CHEST/LUNG: Clear to auscultation bilaterally with good air entry   HEART: S1 S2  regular; no murmurs, gallops or rubs  ABDOMEN: Soft, Nontender, Nondistended; Bowel sounds present  EXTREMITIES: no cyanosis; no edema; no calf tenderness, + stiffness to both upper and lower extremities   SKIN: warm and dry; no rash  NERVOUS SYSTEM:  Awake and alert; Oriented  to herself e ; no new deficits    _________________________________________________  LABS:                        9.9    17.21 )-----------( 346      ( 21 Dec 2021 06:50 )             30.8     12    143  |  112<H>  |  59<H>  ----------------------------<  128<H>  4.8   |  24  |  1.89<H>    Ca    9.2      21 Dec 2021 06:50  Phos  3.6       Mg     2.3         TPro  7.7  /  Alb  2.4<L>  /  TBili  0.2  /  DBili  x   /  AST  7<L>  /  ALT  6<L>  /  AlkPhos  83      PT/INR - ( 20 Dec 2021 21:49 )   PT: 14.1 sec;   INR: 1.19 ratio         PTT - ( 20 Dec 2021 21:49 )  PTT:29.1 sec  Urinalysis Basic - ( 20 Dec 2021 22:52 )    Color: Yellow / Appearance: Clear / S.005 / pH: x  Gluc: x / Ketone: Negative  / Bili: Negative / Urobili: Negative   Blood: x / Protein: 30 mg/dL / Nitrite: Negative   Leuk Esterase: Moderate / RBC: 5-10 /HPF / WBC 11-25 /HPF   Sq Epi: x / Non Sq Epi: Few /HPF / Bacteria: Few /HPF      CAPILLARY BLOOD GLUCOSE            RADIOLOGY & ADDITIONAL TESTS:  < from: Xray Chest 1 View- PORTABLE-Urgent (Xray Chest 1 View- PORTABLE-Urgent .) (21 @ 22:13) >    ACC: [id number] EXAM:  XR CHEST PORTABLE URGENT 1V                          PROCEDURE DATE:  2021          INTERPRETATION:  AP chest on 2021 at 10:06 PM. Patient is   hypoxic.    COMPARISON: None available.    Heart normal for projection.    Lungs are clear.    IMPRESSION: Clear lungs.    --- End of Report ---            YOSELYN CAVANAUGH MD; Attending Radiologist  This document has been electronically signed. Dec 21 2021 10:31AM    < end of copied text >  < from: CT Head No Cont (21 @ 21:55) >    ACC: [id number] EXAM:  CT BRAIN                          PROCEDURE DATE:  2021          INTERPRETATION:  PROCEDURE:  CT HEAD  [id number]    INDICATION:  ams.    COMPARISON: No priors for comparison.    TECHNIQUE:  Multiple consecutive axial images of the brain were obtained.   Coronal and sagittal reformats were created from the axial data set.    Iterative Reconstruction and automatic exposure control was used for dose   reduction.    CONTRAST:No intravenous contrast was administered.    FINDINGS:    BRAIN PARENCHYMA:  No acute hemorrhage. No mass effect or herniation.   Gray-white differentiation is maintained. There are periventricular and   subcortical white matter hypodensities, which are nonspecific, but likely   reflect mild microvascular ischemic disease.    VENTRICLES/EXTRA-AXIAL SPACES:  The ventricles and sulci are prominent in   size, compatible with mild generalized parenchymal volume loss. No   extra-axial fluid collections.    EXTRACRANIAL STRUCTURES: Normal bones and soft tissues. The visualized   paranasal sinuses are clear. The mastoid air cells are well aerated.    IMPRESSION:    1.  No acute intracranial disease.        --- End of Report ---            MAURO NUNEZ   This document has been electronically signed. Dec 20 2021 10:16PM    < end of copied text >    Imaging Personally Reviewed:  YES  Consultant(s) Notes Reviewed:   YES    Care Discussed with Consultants : speech     Plan of care was discussed with patient and /or primary care giver; all questions and concerns were addressed and care was aligned with patient's wishes.

## 2021-12-21 NOTE — ED ADULT NURSE REASSESSMENT NOTE - NS ED NURSE REASSESS COMMENT FT1
Per speech pathology, pt to continue minced & moist diet, with mildly thick liquids.  Speech pathology contacting inpatient MD to make them aware

## 2021-12-21 NOTE — PROGRESS NOTE ADULT - ATTENDING COMMENTS
Patient is a 61y old  Female who presents with a chief complaint of AMS    Alert woman, minimal verbal responses  Vital Signs Last 24 Hrs  T(C): 36.6 (12-21-21 @ 19:45), Max: 37.3 (12-21-21 @ 16:58)  T(F): 97.8 (12-21-21 @ 19:45), Max: 99.1 (12-21-21 @ 16:58)  HR: 76 (12-21-21 @ 19:45) (71 - 94)  BP: 164/79 (12-21-21 @ 19:45) (164/79 - 170/90)  BP(mean): 107 (12-21-21 @ 19:45) (107 - 107)  RR: 20 (12-21-21 @ 19:45) (20 - 20)  SpO2: 98% (12-21-21 @ 19:45) (95% - 99%)  Lungs, clear  Cor, RRR  Abdomen, firm, bs, decreased  Neurological, minimal verbal responses, generalized increased tone.                          9.9    17.21 )-----------( 346      ( 21 Dec 2021 06:50 )             30.8   12-21    143  |  112<H>  |  59<H>  ----------------------------<  128<H>  4.8   |  24  |  1.89<H>    Ca    9.2      21 Dec 2021 06:50  Phos  3.6     12-21  Mg     2.3     12-21    TPro  7.7  /  Alb  2.4<L>  /  TBili  0.2  /  DBili  x   /  AST  7<L>  /  ALT  6<L>  /  AlkPhos  83  12-21    T3, T4, not resulted.     < from: Xray Chest 1 View- PORTABLE-Urgent (Xray Chest 1 View- PORTABLE-Urgent .) (12.20.21 @ 22:13) >      COMPARISON: None available.    Heart normal for projection.    Lungs are clear.    IMPRESSION: Clear lungs.    < end of copied text >      r< from: CT Head No Cont (12.20.21 @ 21:55) >    1.  No acute intracranial disease.    < end of copied text >      IMP:  Progressive dementia, r/o metabolic, treatable cause, eg hypothyroidism          bipolar disorder          hypertonia, likely secondary to psychotropic medications    Plan: T3, T4, B12, renal ultrasound          Case management evaluation regarding post-hospital care.

## 2021-12-21 NOTE — PROGRESS NOTE ADULT - PROBLEM SELECTOR PLAN 7
pmh of hld  continue home meds once able to tolerate po  monitor lipid profile pmh of htn on amlodipine, lisinopril, and toprol  resume amlodipine 10mg QD , toprol XL 100mg QD with parameter

## 2021-12-21 NOTE — PROGRESS NOTE ADULT - ASSESSMENT
Pt is a 60 y/o F with pmh of Bipolar disorder, HTN, HLD, Hypothyroidism, who presented to the ED BIBEMS due to AMS. 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 At the time of exam patient was AO x 0, arousable but lethargic, s/p Haldol and ativan for episode of agitation. Most information obtained from ED note and , Michelle [phone]. Pt is AO x 2 at baseline, for the past 2 weeks there have been changes in her activity and mental status, becoming more confused, not walking or doing much, having gait instability. Today patient was found to be sitting in her own urine, and when told so by the , she hadn't realized it. As per Michelle, no Fevers or chills reported, no sick contacts, no complains that patient expressed, other than the noticeable change in mental status. Patient was admitted at [facility] form 4/19/21 to 5/28/21 due to change in behavior, not able to care for herself and not taking her medications. Since then patient has required more assistance and they have people give her the medications twice a day. She also hasn't been eating well for the past 2 weeks.     In ED CT scan negative for acute disease, UA + for infection. WBC of 13, vira. S/p Rocephin, and haldold/ativan for episode of agitation.      Pt is a 60 y/o F with pmh of Bipolar disorder, HTN, HLD, Hypothyroidism, who presented to the ED BIBEMS due to AMS. 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 At the time of exam patient was AO x 0, arousable but lethargic, s/p Haldol and ativan for episode of agitation. Most information obtained from ED note and , Michelle [phone]. Pt is AO x 2 at baseline, for the past 2 weeks there have been changes in her activity and mental status, becoming more confused, not walking or doing much, having gait instability. Today patient was found to be sitting in her own urine, and when told so by the , she hadn't realized it. As per Michelle, no Fevers or chills reported, no sick contacts, no complains that patient expressed, other than the noticeable change in mental status. Patient was admitted at [facility] form 4/19/21 to 5/28/21 due to change in behavior, not able to care for herself and not taking her medications. Since then patient has required more assistance and they have people give her the medications twice a day. She also hasn't been eating well for the past 2 weeks.     In ED CT scan negative for acute disease, UA + for infection. WBC of 13, vira. S/p Rocephin, and haldold/ativan for episode of agitation. spoke with brother Jose aCnela via phone at [phone], pt has been declined mentally due to dementia and also possible neuropathy per him , asking to have w/u for neuropathy. He also mentioned that she may need placement due to her ADLs and cognition

## 2021-12-21 NOTE — PROGRESS NOTE ADULT - PROBLEM SELECTOR PLAN 9
heparin  protonix TSH - 0.07  on levothyroxine 200mcg at home  hold for now until T3 and Free T4 is available -  unable to addon labs today, will draw in AM

## 2021-12-22 LAB
ANION GAP SERPL CALC-SCNC: 4 MMOL/L — LOW (ref 5–17)
BUN SERPL-MCNC: 45 MG/DL — HIGH (ref 7–18)
CALCIUM SERPL-MCNC: 8.8 MG/DL — SIGNIFICANT CHANGE UP (ref 8.4–10.5)
CHLORIDE SERPL-SCNC: 119 MMOL/L — HIGH (ref 96–108)
CO2 SERPL-SCNC: 26 MMOL/L — SIGNIFICANT CHANGE UP (ref 22–31)
CREAT SERPL-MCNC: 1.21 MG/DL — SIGNIFICANT CHANGE UP (ref 0.5–1.3)
CULTURE RESULTS: NO GROWTH — SIGNIFICANT CHANGE UP
CULTURE RESULTS: SIGNIFICANT CHANGE UP
GLUCOSE SERPL-MCNC: 107 MG/DL — HIGH (ref 70–99)
GRAM STN FLD: SIGNIFICANT CHANGE UP
HCT VFR BLD CALC: 29.3 % — LOW (ref 34.5–45)
HGB BLD-MCNC: 9.2 G/DL — LOW (ref 11.5–15.5)
MCHC RBC-ENTMCNC: 30.8 PG — SIGNIFICANT CHANGE UP (ref 27–34)
MCHC RBC-ENTMCNC: 31.4 GM/DL — LOW (ref 32–36)
MCV RBC AUTO: 98 FL — SIGNIFICANT CHANGE UP (ref 80–100)
NRBC # BLD: 0 /100 WBCS — SIGNIFICANT CHANGE UP (ref 0–0)
PLATELET # BLD AUTO: 327 K/UL — SIGNIFICANT CHANGE UP (ref 150–400)
POTASSIUM SERPL-MCNC: 4.2 MMOL/L — SIGNIFICANT CHANGE UP (ref 3.5–5.3)
POTASSIUM SERPL-SCNC: 4.2 MMOL/L — SIGNIFICANT CHANGE UP (ref 3.5–5.3)
RBC # BLD: 2.99 M/UL — LOW (ref 3.8–5.2)
RBC # FLD: 12.5 % — SIGNIFICANT CHANGE UP (ref 10.3–14.5)
SODIUM SERPL-SCNC: 149 MMOL/L — HIGH (ref 135–145)
SPECIMEN SOURCE: SIGNIFICANT CHANGE UP
T3 SERPL-MCNC: 48 NG/DL — LOW (ref 80–200)
T4 AB SER-ACNC: 13.1 UG/DL — HIGH (ref 4.6–12)
T4 FREE SERPL-MCNC: 1.8 NG/DL — SIGNIFICANT CHANGE UP (ref 0.9–1.8)
T4/T3 UPTAKE INDEX SERPL: 0.8 TBI — SIGNIFICANT CHANGE UP (ref 0.8–1.3)
VIT B12 SERPL-MCNC: 804 PG/ML — SIGNIFICANT CHANGE UP (ref 232–1245)
WBC # BLD: 14.43 K/UL — HIGH (ref 3.8–10.5)
WBC # FLD AUTO: 14.43 K/UL — HIGH (ref 3.8–10.5)

## 2021-12-22 PROCEDURE — 76770 US EXAM ABDO BACK WALL COMP: CPT | Mod: 26

## 2021-12-22 PROCEDURE — 99233 SBSQ HOSP IP/OBS HIGH 50: CPT

## 2021-12-22 RX ORDER — LEVOTHYROXINE SODIUM 125 MCG
175 TABLET ORAL DAILY
Refills: 0 | Status: DISCONTINUED | OUTPATIENT
Start: 2021-12-22 | End: 2022-01-06

## 2021-12-22 RX ORDER — SODIUM CHLORIDE 9 MG/ML
1000 INJECTION, SOLUTION INTRAVENOUS
Refills: 0 | Status: DISCONTINUED | OUTPATIENT
Start: 2021-12-22 | End: 2022-01-03

## 2021-12-22 RX ORDER — LISINOPRIL 2.5 MG/1
20 TABLET ORAL DAILY
Refills: 0 | Status: DISCONTINUED | OUTPATIENT
Start: 2021-12-22 | End: 2021-12-24

## 2021-12-22 RX ADMIN — RISPERIDONE 1 MILLIGRAM(S): 4 TABLET ORAL at 06:26

## 2021-12-22 RX ADMIN — HEPARIN SODIUM 5000 UNIT(S): 5000 INJECTION INTRAVENOUS; SUBCUTANEOUS at 06:26

## 2021-12-22 RX ADMIN — CEFTRIAXONE 100 MILLIGRAM(S): 500 INJECTION, POWDER, FOR SOLUTION INTRAMUSCULAR; INTRAVENOUS at 07:59

## 2021-12-22 RX ADMIN — DIVALPROEX SODIUM 250 MILLIGRAM(S): 500 TABLET, DELAYED RELEASE ORAL at 12:18

## 2021-12-22 RX ADMIN — Medication 60 MILLIGRAM(S): at 12:11

## 2021-12-22 RX ADMIN — SODIUM CHLORIDE 75 MILLILITER(S): 9 INJECTION, SOLUTION INTRAVENOUS at 11:55

## 2021-12-22 RX ADMIN — DIVALPROEX SODIUM 500 MILLIGRAM(S): 500 TABLET, DELAYED RELEASE ORAL at 22:08

## 2021-12-22 RX ADMIN — HEPARIN SODIUM 5000 UNIT(S): 5000 INJECTION INTRAVENOUS; SUBCUTANEOUS at 22:08

## 2021-12-22 RX ADMIN — HEPARIN SODIUM 5000 UNIT(S): 5000 INJECTION INTRAVENOUS; SUBCUTANEOUS at 15:13

## 2021-12-22 RX ADMIN — AMLODIPINE BESYLATE 10 MILLIGRAM(S): 2.5 TABLET ORAL at 06:26

## 2021-12-22 RX ADMIN — RISPERIDONE 1 MILLIGRAM(S): 4 TABLET ORAL at 17:28

## 2021-12-22 RX ADMIN — ATORVASTATIN CALCIUM 40 MILLIGRAM(S): 80 TABLET, FILM COATED ORAL at 22:09

## 2021-12-22 RX ADMIN — Medication 2 MILLIGRAM(S): at 12:18

## 2021-12-22 RX ADMIN — Medication 100 MILLIGRAM(S): at 06:26

## 2021-12-22 RX ADMIN — Medication 81 MILLIGRAM(S): at 12:11

## 2021-12-22 NOTE — PROGRESS NOTE ADULT - PROBLEM SELECTOR PLAN 7
pmh of htn on amlodipine, lisinopril, and toprol  resume amlodipine 10mg QD , toprol XL 100mg QD with parameter pmh of htn on amlodipine, lisinopril, and toprol  resume amlodipine 10mg QD , toprol XL 100mg QD with parameter  will resume lisinopril pmh of htn on amlodipine, lisinopril, and toprol  resume amlodipine 10mg QD , toprol XL 100mg QD with parameter  will resume lisinopril 20mg

## 2021-12-22 NOTE — PROGRESS NOTE ADULT - PROBLEM SELECTOR PLAN 2
Pt with + uA, wbc of 13  f/u urine cultures, blood cultures  continue rocephin  iv fluids  monitor mental status Pt with + uA, wbc of 13  f/u urine cultures, blood cultures 1/2 GNRs  continue ceftriaxone  iv fluids  monitor mental status Pt with + UA, wbc of 13  f/u urine cultures, blood cultures 1/2 GNRs  continue ceftriaxone  c/w iv fluids  monitor mental status

## 2021-12-22 NOTE — PROGRESS NOTE ADULT - SUBJECTIVE AND OBJECTIVE BOX
PGY-1 Progress Note discussed with attending    PAGER #: [[phone]] TILL 5:00 PM  PLEASE CONTACT ON CALL TEAM:  - On Call Team (Please refer to Michael) FROM 5:00 PM - 8:30PM  - Nightfloat Team FROM 8:30 -7:30 AM    CHIEF COMPLAINT & BRIEF HOSPITAL COURSE:  Pt is a 62 y/o F with pmh of Bipolar disorder, HTN, HLD, Hypothyroidism, who presented to the ED BIBEMS due to AMS. 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 At the time of exam patient was AO x 0, arousable but lethargic, s/p Haldol and ativan for episode of agitation. Most information obtained from ED note and , Michelle [phone]. Pt is AO x 2 at baseline, for the past 2 weeks there have been changes in her activity and mental status, becoming more confused, not walking or doing much, having gait instability. Today patient was found to be sitting in her own urine, and when told so by the , she hadn't realized it. As per Michelle, no Fevers or chills reported, no sick contacts, no complains that patient expressed, other than the noticeable change in mental status. Patient was admitted at [facility] form 4/19/21 to 5/28/21 due to change in behavior, not able to care for herself and not taking her medications. Since then patient has required more assistance and they have people give her the medications twice a day. She also hasn't been eating well for the past 2 weeks.     In ED CT scan negative for acute disease, UA + for infection. WBC of 13, vira. S/p Rocephin, and haldold/ativan for episode of agitation.       INTERVAL HPI/OVERNIGHT EVENTS:       REVIEW OF SYSTEMS:  CONSTITUTIONAL: No fever, weight loss, or fatigue  RESPIRATORY: No cough, wheezing, chills or hemoptysis; No shortness of breath  CARDIOVASCULAR: No chest pain, palpitations, dizziness, or leg swelling  GASTROINTESTINAL: No abdominal pain. No nausea, vomiting, or hematemesis; No diarrhea or constipation. No melena or hematochezia.  GENITOURINARY: No dysuria or hematuria, urinary frequency  NEUROLOGICAL: No headaches, memory loss, loss of strength, numbness, or tremors  SKIN: No itching, burning, rashes, or lesions     Vital Signs Last 24 Hrs  T(C): 36.5 (22 Dec 2021 04:45), Max: 37.3 (21 Dec 2021 16:58)  T(F): 97.7 (22 Dec 2021 04:45), Max: 99.1 (21 Dec 2021 16:58)  HR: 75 (22 Dec 2021 04:45) (75 - 85)  BP: 167/87 (22 Dec 2021 04:45) (164/79 - 170/90)  BP(mean): 107 (21 Dec 2021 19:45) (107 - 107)  RR: 18 (22 Dec 2021 04:45) (18 - 20)  SpO2: 99% (22 Dec 2021 04:45) (95% - 99%)    PHYSICAL EXAMINATION:  GENERAL: NAD, well built  HEAD:  Atraumatic, Normocephalic  EYES:  conjunctiva and sclera clear  NECK: Supple, No JVD, Normal thyroid  CHEST/LUNG: Clear to auscultation. Clear to percussion bilaterally; No rales, rhonchi, wheezing, or rubs  HEART: Regular rate and rhythm; No murmurs, rubs, or gallops  ABDOMEN: Soft, Nontender, Nondistended; Bowel sounds present  NERVOUS SYSTEM:  Alert & Oriented X3,    EXTREMITIES:  2+ Peripheral Pulses, No clubbing, cyanosis, or edema  SKIN: warm dry                          9.2    14.43 )-----------( 327      ( 22 Dec 2021 06:14 )             29.3     12-22    149<H>  |  119<H>  |  45<H>  ----------------------------<  107<H>  4.2   |  26  |  1.21    Ca    8.8      22 Dec 2021 06:14  Phos  3.6     12-21  Mg     2.3     12-21    TPro  7.7  /  Alb  2.4<L>  /  TBili  0.2  /  DBili  x   /  AST  7<L>  /  ALT  6<L>  /  AlkPhos  83  12-21    LIVER FUNCTIONS - ( 21 Dec 2021 06:50 )  Alb: 2.4 g/dL / Pro: 7.7 g/dL / ALK PHOS: 83 U/L / ALT: 6 U/L DA / AST: 7 U/L / GGT: x               PT/INR - ( 20 Dec 2021 21:49 )   PT: 14.1 sec;   INR: 1.19 ratio         PTT - ( 20 Dec 2021 21:49 )  PTT:29.1 sec    CAPILLARY BLOOD GLUCOSE      RADIOLOGY & ADDITIONAL TESTS:                   PGY-1 Progress Note discussed with attending    PAGER #: [[phone]] TILL 5:00 PM  PLEASE CONTACT ON CALL TEAM:  - On Call Team (Please refer to Michael) FROM 5:00 PM - 8:30PM  - Nightfloat Team FROM 8:30 -7:30 AM    CHIEF COMPLAINT & BRIEF HOSPITAL COURSE:  Pt is a 62 y/o F with pmh of Bipolar disorder, HTN, HLD, Hypothyroidism, who presented to the ED BIBEMS due to AMS. 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 At the time of exam patient was AO x 0, arousable but lethargic, s/p Haldol and ativan for episode of agitation. Most information obtained from ED note and , Michelle [phone]. Pt is AO x 2 at baseline, for the past 2 weeks there have been changes in her activity and mental status, becoming more confused, not walking or doing much, having gait instability. Today patient was found to be sitting in her own urine, and when told so by the , she hadn't realized it. As per Michelle, no Fevers or chills reported, no sick contacts, no complains that patient expressed, other than the noticeable change in mental status. Patient was admitted at [facility] form 4/19/21 to 5/28/21 due to change in behavior, not able to care for herself and not taking her medications. Since then patient has required more assistance and they have people give her the medications twice a day. She also hasn't been eating well for the past 2 weeks.     In ED CT scan negative for acute disease, UA + for infection. WBC of 13, vira. S/p Rocephin, and haldold/ativan for episode of agitation.       INTERVAL HPI/OVERNIGHT EVENTS:   patient examined at bedside this morning. no new complaints. patient was lethargic but arousable. drinking water. seems to be waxing and waning mental status.  Patient hypertensive this morning.    REVIEW OF SYSTEMS:  CONSTITUTIONAL: No fever, weight loss, or fatigue  RESPIRATORY: No cough, wheezing, chills or hemoptysis; No shortness of breath  CARDIOVASCULAR: No chest pain, palpitations, dizziness, or leg swelling  GASTROINTESTINAL: No abdominal pain. No nausea, vomiting, or hematemesis; No diarrhea or constipation. No melena or hematochezia.  GENITOURINARY: No dysuria or hematuria, urinary frequency  NEUROLOGICAL: No headaches, memory loss, loss of strength, numbness, or tremors  SKIN: No itching, burning, rashes, or lesions     Vital Signs Last 24 Hrs  T(C): 36.5 (22 Dec 2021 04:45), Max: 37.3 (21 Dec 2021 16:58)  T(F): 97.7 (22 Dec 2021 04:45), Max: 99.1 (21 Dec 2021 16:58)  HR: 75 (22 Dec 2021 04:45) (75 - 85)  BP: 167/87 (22 Dec 2021 04:45) (164/79 - 170/90)  BP(mean): 107 (21 Dec 2021 19:45) (107 - 107)  RR: 18 (22 Dec 2021 04:45) (18 - 20)  SpO2: 99% (22 Dec 2021 04:45) (95% - 99%)    PHYSICAL EXAMINATION:  GENERAL: NAD, well built  HEAD:  Atraumatic, Normocephalic  EYES:  conjunctiva and sclera clear  NECK: Supple, No JVD, Normal thyroid  CHEST/LUNG: Clear to auscultation. Clear to percussion bilaterally; No rales, rhonchi, wheezing, or rubs  HEART: Regular rate and rhythm; No murmurs, rubs, or gallops  ABDOMEN: Soft, Nontender, Nondistended; Bowel sounds present  NERVOUS SYSTEM:  Alert & Oriented X2   EXTREMITIES:  2+ Peripheral Pulses, No clubbing, cyanosis, or edema  SKIN: warm dry                          9.2    14.43 )-----------( 327      ( 22 Dec 2021 06:14 )             29.3     12-22    149<H>  |  119<H>  |  45<H>  ----------------------------<  107<H>  4.2   |  26  |  1.21    Ca    8.8      22 Dec 2021 06:14  Phos  3.6     12-21  Mg     2.3     12-21    TPro  7.7  /  Alb  2.4<L>  /  TBili  0.2  /  DBili  x   /  AST  7<L>  /  ALT  6<L>  /  AlkPhos  83  12-21    LIVER FUNCTIONS - ( 21 Dec 2021 06:50 )  Alb: 2.4 g/dL / Pro: 7.7 g/dL / ALK PHOS: 83 U/L / ALT: 6 U/L DA / AST: 7 U/L / GGT: x               PT/INR - ( 20 Dec 2021 21:49 )   PT: 14.1 sec;   INR: 1.19 ratio         PTT - ( 20 Dec 2021 21:49 )  PTT:29.1 sec    CAPILLARY BLOOD GLUCOSE      RADIOLOGY & ADDITIONAL TESTS:                   PGY-1 Progress Note discussed with attending    PAGER #: [[phone]] TILL 5:00 PM  PLEASE CONTACT ON CALL TEAM:  - On Call Team (Please refer to iMchael) FROM 5:00 PM - 8:30PM  - Nightfloat Team FROM 8:30 -7:30 AM    CHIEF COMPLAINT & BRIEF HOSPITAL COURSE:  Pt is a 62 y/o F with pmh of Bipolar disorder, HTN, HLD, Hypothyroidism, who presented to the ED BIBEMS due to AMS. 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 At the time of exam patient was AO x 0, arousable but lethargic, s/p Haldol and ativan for episode of agitation. Most information obtained from ED note and , Michelle [phone]. Pt is AO x 2 at baseline, for the past 2 weeks there have been changes in her activity and mental status, becoming more confused, not walking or doing much, having gait instability. Today patient was found to be sitting in her own urine, and when told so by the , she hadn't realized it. As per Michelle, no Fevers or chills reported, no sick contacts, no complains that patient expressed, other than the noticeable change in mental status. Patient was admitted at [facility] form 4/19/21 to 5/28/21 due to change in behavior, not able to care for herself and not taking her medications. Since then patient has required more assistance and they have people give her the medications twice a day. She also hasn't been eating well for the past 2 weeks.     In ED CT scan negative for acute disease, UA + for infection. WBC of 13, vira. S/p Rocephin, and haldold/ativan for episode of agitation.       INTERVAL HPI/OVERNIGHT EVENTS:   patient examined at bedside this morning. no new complaints. patient was lethargic but arousable. drinking water. seems to be waxing and waning mental status.  Patient hypertensive this morning.    REVIEW OF SYSTEMS:  CONSTITUTIONAL: No fever, weight loss, or fatigue  RESPIRATORY: No cough, wheezing, chills or hemoptysis; No shortness of breath  CARDIOVASCULAR: No chest pain, palpitations, dizziness, or leg swelling  GASTROINTESTINAL: No abdominal pain. No nausea, vomiting, or hematemesis; No diarrhea or constipation. No melena or hematochezia.  GENITOURINARY: improving dysuria, Denies hematuria, urinary frequency  NEUROLOGICAL: No headaches, memory loss, loss of strength, numbness, or tremors  SKIN: No itching, burning, rashes, or lesions     Vital Signs Last 24 Hrs  T(C): 36.5 (22 Dec 2021 04:45), Max: 37.3 (21 Dec 2021 16:58)  T(F): 97.7 (22 Dec 2021 04:45), Max: 99.1 (21 Dec 2021 16:58)  HR: 75 (22 Dec 2021 04:45) (75 - 85)  BP: 167/87 (22 Dec 2021 04:45) (164/79 - 170/90)  BP(mean): 107 (21 Dec 2021 19:45) (107 - 107)  RR: 18 (22 Dec 2021 04:45) (18 - 20)  SpO2: 99% (22 Dec 2021 04:45) (95% - 99%)    PHYSICAL EXAMINATION:  GENERAL: NAD, well built  HEAD:  Atraumatic, Normocephalic  EYES:  conjunctiva and sclera clear  NECK: Supple, No JVD, Normal thyroid  CHEST/LUNG: Clear to auscultation. Clear to percussion bilaterally; No rales, rhonchi, wheezing, or rubs  HEART: Regular rate and rhythm; No murmurs, rubs, or gallops  ABDOMEN: Soft, Nontender, Nondistended; Bowel sounds present  NERVOUS SYSTEM:  Alert & Oriented X2-3  EXTREMITIES:  2+ Peripheral Pulses, No clubbing, cyanosis, or edema  SKIN: warm dry                          9.2    14.43 )-----------( 327      ( 22 Dec 2021 06:14 )             29.3     12-22    149<H>  |  119<H>  |  45<H>  ----------------------------<  107<H>  4.2   |  26  |  1.21    Ca    8.8      22 Dec 2021 06:14  Phos  3.6     12-21  Mg     2.3     12-21    TPro  7.7  /  Alb  2.4<L>  /  TBili  0.2  /  DBili  x   /  AST  7<L>  /  ALT  6<L>  /  AlkPhos  83  12-21    LIVER FUNCTIONS - ( 21 Dec 2021 06:50 )  Alb: 2.4 g/dL / Pro: 7.7 g/dL / ALK PHOS: 83 U/L / ALT: 6 U/L DA / AST: 7 U/L / GGT: x               PT/INR - ( 20 Dec 2021 21:49 )   PT: 14.1 sec;   INR: 1.19 ratio         PTT - ( 20 Dec 2021 21:49 )  PTT:29.1 sec    CAPILLARY BLOOD GLUCOSE      RADIOLOGY & ADDITIONAL TESTS:

## 2021-12-22 NOTE — PROGRESS NOTE ADULT - PROBLEM SELECTOR PLAN 3
brother reported that pt is dementia which is getting worse now x 2 weeks   supportive care  due to physical and mental limitation due to dementia progression pt may need a placement  pending PT eval

## 2021-12-22 NOTE — PROGRESS NOTE ADULT - ATTENDING COMMENTS
#Bacteremia  #UTI  #Altered mental status  #Toxic metabolic encephalopathy  #AYAN likely pre-renal improved with IVF  #HTN  #Progressive dementia  #Physical deconditioning  #Hypothyroidism on levothyroxine, Ft14 and TSH elevated.   -- f/u repeat blood cx sent today  -- continue ceftriaxone, WBC improving  -- ID consult  -- PT recommending CRISTOBAL  -- decreased levothyroxine to 175mcg from 200mcg, will need repeat TSH check in 6 weeks

## 2021-12-22 NOTE — PHYSICAL THERAPY INITIAL EVALUATION ADULT - ADDITIONAL COMMENTS
Patient used R.W.-as per patient.  Patient has been getting weaker and falling lately -as per chart  Unable to get through listed phone number of brother

## 2021-12-22 NOTE — PATIENT PROFILE ADULT - FALL HARM RISK - HARM RISK INTERVENTIONS

## 2021-12-22 NOTE — PROGRESS NOTE ADULT - PROBLEM SELECTOR PLAN 5
hx of bipolar disorder on multiple meds  hx of non compliance, but now workers of the program she lives in are giving her the medications  continue divalproex 250mg QD 500mg QHS, prozac 60mg QD, risperdal 1mg BID home dose

## 2021-12-22 NOTE — PROGRESS NOTE ADULT - PROBLEM SELECTOR PLAN 4
pt with cr of 2.69, unknown baseline   likely pre-renal in setting of poor po intake at home as per   f/u urine studies  repeat Cr 1.89   continue IVF   BMP in AM   can consider Renal/ bladder ultrasound if AYAN worsen  hold off lisinopril in the setting of AYAN pt with cr of 2.69, unknown baseline   likely pre-renal in setting of poor po intake at home as per   f/u urine studies  repeat Cr 1.21  continue IVF   BMP in AM   AYAN resolved, continue to trend Cr  will restart lisinopril due to HTN

## 2021-12-22 NOTE — PROGRESS NOTE ADULT - PROBLEM SELECTOR PLAN 9
TSH - 0.07  on levothyroxine 200mcg at home  hold for now until T3 and Free T4 is available -  unable to addon labs today, will draw in AM TSH - 0.07  on levothyroxine 200mcg at home  hold for now until T3 and Free T4 is available -  unable to addon labs today, will draw in AM  will reduce Levothyroxine dose to 175mcg due to hyperthyroid state

## 2021-12-22 NOTE — PROGRESS NOTE ADULT - PROBLEM SELECTOR PLAN 1
pt with ams, progressively worsening for 1 week  has hx of bipolar disorder and hospitalizations due changes in behavior (last one in [place])  CTH negative for acute pathology, no focal neurological findings other than ams, but exam difficult to assess in current status.   likely due to infection (+UA, incontinence) and also worsening of dementia   Continue ceftriaxone for now and f/u urine culture   IV fluids pt with ams, progressively worsening for 1 week  has hx of bipolar disorder and hospitalizations due changes in behavior (last one in [place])  CTH negative for acute pathology, no focal neurological findings other than ams, but exam difficult to assess in current status.   likely due to infection (+UA, incontinence) and also worsening of dementia   Continue ceftriaxone for now and f/u urine culture  1/2 blood cultures grew GNRs   IV fluids pt with ams, progressively worsening for 1 week  has hx of bipolar disorder and hospitalizations due changes in behavior (last one in [place])  CTH negative for acute pathology, no focal neurological findings other than ams, but exam difficult to assess in current status.   likely due to infection (+UA, incontinence) and also worsening of dementia   Continue ceftriaxone for now and f/u urine culture  1/2 blood cultures grew GNRs   c/w D5 1/2 NS IV fluids

## 2021-12-22 NOTE — PROGRESS NOTE ADULT - ASSESSMENT
Pt is a 60 y/o F with pmh of Bipolar disorder, HTN, HLD, Hypothyroidism, who presented to the ED BIBEMS due to AMS. 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 At the time of exam patient was AO x 0, arousable but lethargic, s/p Haldol and ativan for episode of agitation. Most information obtained from ED note and , Michelle [phone]. Pt is AO x 2 at baseline, for the past 2 weeks there have been changes in her activity and mental status, becoming more confused, not walking or doing much, having gait instability. Today patient was found to be sitting in her own urine, and when told so by the , she hadn't realized it. As per Michelle, no Fevers or chills reported, no sick contacts, no complains that patient expressed, other than the noticeable change in mental status. Patient was admitted at [facility] form 4/19/21 to 5/28/21 due to change in behavior, not able to care for herself and not taking her medications. Since then patient has required more assistance and they have people give her the medications twice a day. She also hasn't been eating well for the past 2 weeks.     In ED CT scan negative for acute disease, UA + for infection. WBC of 13, vira. S/p Rocephin, and haldold/ativan for episode of agitation. spoke with brother Jose Canela via phone at [phone], pt has been declined mentally due to dementia and also possible neuropathy per him , asking to have w/u for neuropathy. He also mentioned that she may need placement due to her ADLs and cognition

## 2021-12-23 LAB
ANION GAP SERPL CALC-SCNC: 6 MMOL/L — SIGNIFICANT CHANGE UP (ref 5–17)
BUN SERPL-MCNC: 24 MG/DL — HIGH (ref 7–18)
CALCIUM SERPL-MCNC: 8.7 MG/DL — SIGNIFICANT CHANGE UP (ref 8.4–10.5)
CHLORIDE SERPL-SCNC: 116 MMOL/L — HIGH (ref 96–108)
CO2 SERPL-SCNC: 25 MMOL/L — SIGNIFICANT CHANGE UP (ref 22–31)
CREAT SERPL-MCNC: 1.05 MG/DL — SIGNIFICANT CHANGE UP (ref 0.5–1.3)
GLUCOSE SERPL-MCNC: 85 MG/DL — SIGNIFICANT CHANGE UP (ref 70–99)
HCT VFR BLD CALC: 30 % — LOW (ref 34.5–45)
HGB BLD-MCNC: 9.4 G/DL — LOW (ref 11.5–15.5)
MCHC RBC-ENTMCNC: 30.6 PG — SIGNIFICANT CHANGE UP (ref 27–34)
MCHC RBC-ENTMCNC: 31.3 GM/DL — LOW (ref 32–36)
MCV RBC AUTO: 97.7 FL — SIGNIFICANT CHANGE UP (ref 80–100)
NRBC # BLD: 0 /100 WBCS — SIGNIFICANT CHANGE UP (ref 0–0)
PLATELET # BLD AUTO: 341 K/UL — SIGNIFICANT CHANGE UP (ref 150–400)
POTASSIUM SERPL-MCNC: 4 MMOL/L — SIGNIFICANT CHANGE UP (ref 3.5–5.3)
POTASSIUM SERPL-SCNC: 4 MMOL/L — SIGNIFICANT CHANGE UP (ref 3.5–5.3)
RBC # BLD: 3.07 M/UL — LOW (ref 3.8–5.2)
RBC # FLD: 12.5 % — SIGNIFICANT CHANGE UP (ref 10.3–14.5)
SODIUM SERPL-SCNC: 147 MMOL/L — HIGH (ref 135–145)
WBC # BLD: 13.05 K/UL — HIGH (ref 3.8–10.5)
WBC # FLD AUTO: 13.05 K/UL — HIGH (ref 3.8–10.5)

## 2021-12-23 PROCEDURE — 99223 1ST HOSP IP/OBS HIGH 75: CPT

## 2021-12-23 PROCEDURE — 99233 SBSQ HOSP IP/OBS HIGH 50: CPT

## 2021-12-23 RX ORDER — CEFTRIAXONE 500 MG/1
1000 INJECTION, POWDER, FOR SOLUTION INTRAMUSCULAR; INTRAVENOUS EVERY 24 HOURS
Refills: 0 | Status: DISCONTINUED | OUTPATIENT
Start: 2021-12-23 | End: 2021-12-27

## 2021-12-23 RX ORDER — AMLODIPINE BESYLATE 2.5 MG/1
10 TABLET ORAL ONCE
Refills: 0 | Status: COMPLETED | OUTPATIENT
Start: 2021-12-23 | End: 2021-12-23

## 2021-12-23 RX ADMIN — CEFTRIAXONE 100 MILLIGRAM(S): 500 INJECTION, POWDER, FOR SOLUTION INTRAMUSCULAR; INTRAVENOUS at 08:02

## 2021-12-23 RX ADMIN — Medication 81 MILLIGRAM(S): at 12:20

## 2021-12-23 RX ADMIN — RISPERIDONE 1 MILLIGRAM(S): 4 TABLET ORAL at 05:26

## 2021-12-23 RX ADMIN — ATORVASTATIN CALCIUM 40 MILLIGRAM(S): 80 TABLET, FILM COATED ORAL at 21:31

## 2021-12-23 RX ADMIN — CEFTRIAXONE 100 MILLIGRAM(S): 500 INJECTION, POWDER, FOR SOLUTION INTRAMUSCULAR; INTRAVENOUS at 11:49

## 2021-12-23 RX ADMIN — AMLODIPINE BESYLATE 10 MILLIGRAM(S): 2.5 TABLET ORAL at 23:33

## 2021-12-23 RX ADMIN — Medication 100 MILLIGRAM(S): at 05:26

## 2021-12-23 RX ADMIN — RISPERIDONE 1 MILLIGRAM(S): 4 TABLET ORAL at 18:36

## 2021-12-23 RX ADMIN — HEPARIN SODIUM 5000 UNIT(S): 5000 INJECTION INTRAVENOUS; SUBCUTANEOUS at 14:43

## 2021-12-23 RX ADMIN — Medication 175 MICROGRAM(S): at 05:26

## 2021-12-23 RX ADMIN — LISINOPRIL 20 MILLIGRAM(S): 2.5 TABLET ORAL at 05:26

## 2021-12-23 RX ADMIN — HEPARIN SODIUM 5000 UNIT(S): 5000 INJECTION INTRAVENOUS; SUBCUTANEOUS at 05:26

## 2021-12-23 RX ADMIN — DIVALPROEX SODIUM 500 MILLIGRAM(S): 500 TABLET, DELAYED RELEASE ORAL at 21:32

## 2021-12-23 RX ADMIN — DIVALPROEX SODIUM 250 MILLIGRAM(S): 500 TABLET, DELAYED RELEASE ORAL at 11:49

## 2021-12-23 RX ADMIN — HEPARIN SODIUM 5000 UNIT(S): 5000 INJECTION INTRAVENOUS; SUBCUTANEOUS at 21:31

## 2021-12-23 RX ADMIN — AMLODIPINE BESYLATE 10 MILLIGRAM(S): 2.5 TABLET ORAL at 05:26

## 2021-12-23 RX ADMIN — Medication 60 MILLIGRAM(S): at 11:50

## 2021-12-23 RX ADMIN — Medication 2 MILLIGRAM(S): at 11:50

## 2021-12-23 NOTE — PROGRESS NOTE ADULT - SUBJECTIVE AND OBJECTIVE BOX
PGY-1 Progress Note discussed with attending    PAGER #: [[phone]] TILL 5:00 PM  PLEASE CONTACT ON CALL TEAM:  - On Call Team (Please refer to Michael) FROM 5:00 PM - 8:30PM  - Nightfloat Team FROM 8:30 -7:30 AM    HPI  Pt is a 62 y/o F with pmh of Bipolar disorder, HTN, HLD, Hypothyroidism, who presented to the ED BIBEMS due to AMS. 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 At the time of exam patient was AO x 0, arousable but lethargic, s/p Haldol and ativan for episode of agitation. Most information obtained from ED note and , Michelle [phone]. Pt is AO x 2 at baseline, for the past 2 weeks there have been changes in her activity and mental status, becoming more confused, not walking or doing much, having gait instability. Today patient was found to be sitting in her own urine, and when told so by the , she hadn't realized it. As per Michelle, no Fevers or chills reported, no sick contacts, no complains that patient expressed, other than the noticeable change in mental status. Patient was admitted at [facility] form 4/19/21 to 5/28/21 due to change in behavior, not able to care for herself and not taking her medications. Since then patient has required more assistance and they have people give her the medications twice a day. She also hasn't been eating well for the past 2 weeks.     In ED CT scan negative for acute disease, UA + for infection. WBC of 13, vira. S/p Rocephin, and haldold/ativan for episode of agitation.         INTERNAL EVENTS:   Patient examined at bedside this morning. Mental status is much improved from yesterday. Patient is alert and oriented x3. She has no new complaints.     REVIEW OF SYSTEMS:  CONSTITUTIONAL: No fever, weight loss, or fatigue  RESPIRATORY: No cough, wheezing, chills or hemoptysis; No shortness of breath  CARDIOVASCULAR: No chest pain, palpitations, dizziness, or leg swelling  GASTROINTESTINAL: No abdominal pain. No nausea, vomiting, or hematemesis; No diarrhea or constipation. No melena or hematochezia.  GENITOURINARY: No dysuria or hematuria, urinary frequency  NEUROLOGICAL: No headaches, memory loss, loss of strength, numbness, or tremors  SKIN: No itching, burning, rashes, or lesions     MEDICATIONS  (STANDING):  amLODIPine   Tablet 10 milliGRAM(s) Oral daily  aspirin  chewable 81 milliGRAM(s) Oral daily  atorvastatin 40 milliGRAM(s) Oral at bedtime  benztropine 2 milliGRAM(s) Oral daily  cefTRIAXone   IVPB 1000 milliGRAM(s) IV Intermittent every 24 hours  dextrose 5% + sodium chloride 0.45%. 1000 milliLiter(s) (75 mL/Hr) IV Continuous <Continuous>  diVALproex  milliGRAM(s) Oral daily  diVALproex  milliGRAM(s) Oral at bedtime  FLUoxetine 60 milliGRAM(s) Oral daily  heparin   Injectable 5000 Unit(s) SubCutaneous every 8 hours  levothyroxine 175 MICROGram(s) Oral daily  lisinopril 20 milliGRAM(s) Oral daily  metoprolol succinate  milliGRAM(s) Oral daily  risperiDONE   Tablet 1 milliGRAM(s) Oral two times a day    MEDICATIONS  (PRN):      Vital Signs Last 24 Hrs  T(C): 36.9 (23 Dec 2021 05:16), Max: 36.9 (22 Dec 2021 13:58)  T(F): 98.5 (23 Dec 2021 05:16), Max: 98.5 (22 Dec 2021 13:58)  HR: 87 (23 Dec 2021 05:16) (74 - 87)  BP: 146/85 (23 Dec 2021 05:16) (105/61 - 159/95)  BP(mean): --  RR: 18 (23 Dec 2021 05:16) (18 - 19)  SpO2: 98% (23 Dec 2021 05:16) (96% - 100%)    PHYSICAL EXAMINATION:  GENERAL: NAD, AAOx3  HEAD: AT/NC  EYES: conjunctiva and sclera clear  NECK: supple, No JVD noted, Normal thyroid  CHEST/LUNG: CTABL; no rales, rhonchi, wheezing, or rubs  HEART: regular rate and rhythm; no murmurs, rubs, or gallops  ABDOMEN: soft, nontender, nondistended; Bowel sounds present  EXTREMITIES:  2+ Peripheral Pulses, No clubbing, cyanosis, or edema  SKIN: warm dry                          9.4    13.05 )-----------( 341      ( 23 Dec 2021 08:36 )             30.0     12-23    147<H>  |  116<H>  |  24<H>  ----------------------------<  85  4.0   |  25  |  1.05    Ca    8.7      23 Dec 2021 08:36              COVID-19 PCR: Jong (20 Dec 2021 21:49)      CAPILLARY BLOOD GLUCOSE          RADIOLOGY & ADDITIONAL TESTS:                   PGY-1 Progress Note discussed with attending    PAGER #: [[phone]] TILL 5:00 PM  PLEASE CONTACT ON CALL TEAM:  - On Call Team (Please refer to Michael) FROM 5:00 PM - 8:30PM  - Nightfloat Team FROM 8:30 -7:30 AM    HPI  Pt is a 62 y/o F with pmh of Bipolar disorder, HTN, HLD, Hypothyroidism, who presented to the ED BIBEMS due to AMS. 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 At the time of exam patient was AO x 0, arousable but lethargic, s/p Haldol and ativan for episode of agitation. Most information obtained from ED note and , Michelle [phone]. Pt is AO x 2 at baseline, for the past 2 weeks there have been changes in her activity and mental status, becoming more confused, not walking or doing much, having gait instability. Today patient was found to be sitting in her own urine, and when told so by the , she hadn't realized it. As per Michelle, no Fevers or chills reported, no sick contacts, no complains that patient expressed, other than the noticeable change in mental status. Patient was admitted at [facility] form 4/19/21 to 5/28/21 due to change in behavior, not able to care for herself and not taking her medications. Since then patient has required more assistance and they have people give her the medications twice a day. She also hasn't been eating well for the past 2 weeks.     In ED CT scan negative for acute disease, UA + for infection. WBC of 13, vira. S/p Rocephin, and haldold/ativan for episode of agitation.         INTERNAL EVENTS:   Patient examined at bedside this morning. Mental status is much improved from yesterday. Patient is alert and oriented x3. She has no new complaints.   Blood cultures were repeated.    REVIEW OF SYSTEMS:  CONSTITUTIONAL: No fever, weight loss, or fatigue  RESPIRATORY: No cough, wheezing, chills or hemoptysis; No shortness of breath  CARDIOVASCULAR: No chest pain, palpitations, dizziness, or leg swelling  GASTROINTESTINAL: No abdominal pain. No nausea, vomiting, or hematemesis; No diarrhea or constipation. No melena or hematochezia.  GENITOURINARY: No dysuria or hematuria, urinary frequency  NEUROLOGICAL: No headaches, memory loss, loss of strength, numbness, or tremors  SKIN: No itching, burning, rashes, or lesions     MEDICATIONS  (STANDING):  amLODIPine   Tablet 10 milliGRAM(s) Oral daily  aspirin  chewable 81 milliGRAM(s) Oral daily  atorvastatin 40 milliGRAM(s) Oral at bedtime  benztropine 2 milliGRAM(s) Oral daily  cefTRIAXone   IVPB 1000 milliGRAM(s) IV Intermittent every 24 hours  dextrose 5% + sodium chloride 0.45%. 1000 milliLiter(s) (75 mL/Hr) IV Continuous <Continuous>  diVALproex  milliGRAM(s) Oral daily  diVALproex  milliGRAM(s) Oral at bedtime  FLUoxetine 60 milliGRAM(s) Oral daily  heparin   Injectable 5000 Unit(s) SubCutaneous every 8 hours  levothyroxine 175 MICROGram(s) Oral daily  lisinopril 20 milliGRAM(s) Oral daily  metoprolol succinate  milliGRAM(s) Oral daily  risperiDONE   Tablet 1 milliGRAM(s) Oral two times a day    MEDICATIONS  (PRN):      Vital Signs Last 24 Hrs  T(C): 36.9 (23 Dec 2021 05:16), Max: 36.9 (22 Dec 2021 13:58)  T(F): 98.5 (23 Dec 2021 05:16), Max: 98.5 (22 Dec 2021 13:58)  HR: 87 (23 Dec 2021 05:16) (74 - 87)  BP: 146/85 (23 Dec 2021 05:16) (105/61 - 159/95)  BP(mean): --  RR: 18 (23 Dec 2021 05:16) (18 - 19)  SpO2: 98% (23 Dec 2021 05:16) (96% - 100%)    PHYSICAL EXAMINATION:  GENERAL: NAD, AAOx3  HEAD: AT/NC  EYES: conjunctiva and sclera clear  NECK: supple, No JVD noted, Normal thyroid  CHEST/LUNG: CTABL; no rales, rhonchi, wheezing, or rubs  HEART: regular rate and rhythm; no murmurs, rubs, or gallops  ABDOMEN: soft, nontender, nondistended; Bowel sounds present  EXTREMITIES:  2+ Peripheral Pulses, No clubbing, cyanosis, or edema  SKIN: warm dry                          9.4    13.05 )-----------( 341      ( 23 Dec 2021 08:36 )             30.0     12-23    147<H>  |  116<H>  |  24<H>  ----------------------------<  85  4.0   |  25  |  1.05    Ca    8.7      23 Dec 2021 08:36              COVID-19 PCR: Vianeytegeovanni (20 Dec 2021 21:49)      CAPILLARY BLOOD GLUCOSE          RADIOLOGY & ADDITIONAL TESTS:

## 2021-12-23 NOTE — PROGRESS NOTE ADULT - PROBLEM SELECTOR PLAN 7
pmh of htn on amlodipine, lisinopril, and toprol  resume amlodipine 10mg QD , toprol XL 100mg QD with parameter  will resume lisinopril 20mg

## 2021-12-23 NOTE — PROGRESS NOTE ADULT - PROBLEM SELECTOR PLAN 4
pt with cr of 2.69, unknown baseline   likely pre-renal in setting of poor po intake at home as per   f/u urine studies  repeat Cr 1.05  continue IVF   BMP in AM   AYAN resolved, continue to trend Cr  lisinopril for HTN, monitor Cr Resolved  pt with cr of 2.69, unknown baseline   likely pre-renal in setting of poor po intake at home as per   repeat Cr 1.05  continue IVF   BMP in AM   AYAN resolved, continue to trend Cr  lisinopril for HTN, monitor Cr

## 2021-12-23 NOTE — CONSULT NOTE ADULT - ASSESSMENT
Pt is a 61 year old female with PMHx of Bipolar disorder, HTN, HLD, Hypothyroidism, who is from "Level 2 conjugate program" for people with mental disabilities and sent to [facility] ED for AMS. Pt admitted for acute encephalopathy likely due to UTI. ID asked to evaluate patient for bacteremia with Psychrobacter species. UA with WBC >11-25; UCx no growth; one set of blood culture growing GNR Psychrobacter species (no susceptibilities done). Pt started on ceftriaxone. WBC downtrending.     #1 Acute encephalopathy - resolved now    #2 Bacteremia with Psychrobacter species        -- continue ceftriaxone  -- repeat BC to check for clearing  -- request send out study for susceptibilities of Psychrobacter species    Pt is a 61 year old female with PMHx of Bipolar disorder, HTN, HLD, Hypothyroidism from "Level 2 conjugate program" admitted 12/20 for acute encephalopathy thought due to UTI. ID asked to evaluate patient for bacteremia. UA with WBC >11-25; UCx no growth; one set of blood cultures growing GNR identified as Psychrobacter species. Pt started on ceftriaxone on admission. WBC downtrending. This organism is usually an environmental organism. Limited data exist regarding its pathogenicity. I contacted the  [facility] ([phone]) to obtain susceptibilities. The organism will be sent to an outside reference lab for susceptibility testing. In view of patient's clinical improvement on CTX, would continue to Rx to complete 7d.    #1 Acute encephalopathy - resolved now    #2 Bacteremia with Psychrobacter species--see above        -- continue ceftriaxone  -- repeat BC to check for clearing    Discussed above in detail with Dr. Raya.

## 2021-12-23 NOTE — PROGRESS NOTE ADULT - PROBLEM SELECTOR PLAN 1
pt with ams, progressively worsening for 1 week  has hx of bipolar disorder and hospitalizations due changes in behavior (last one in [place])  CTH negative for acute pathology, no focal neurological findings other than ams, but exam difficult to assess in current status.   likely due to infection (+UA, incontinence) and also worsening of dementia   Continue ceftriaxone for now and f/u urine culture  1/2 blood cultures grew GNRs Psychrobacter species  consult ID for ABx guidance given Psychrobacter species  c/w D5 1/2 NS IV fluids pt with ams, progressively worsening for 1 week  has hx of bipolar disorder and hospitalizations due changes in behavior (last one in [place])  CTH negative for acute pathology, no focal neurological findings other than ams, but exam difficult to assess in current status.   likely due to infection (+UA, incontinence) and also worsening of dementia   Continue ceftriaxone for now   UCx: NGTD  1/2 blood cultures grew GNRs Psychrobacter species  consult ID for ABx guidance given Psychrobacter species  ID consulted - Dr. Villasenor  completed D5 1/2 NS

## 2021-12-23 NOTE — PROGRESS NOTE ADULT - PROBLEM SELECTOR PLAN 2
Pt with + UA, wbc of 13  f/u urine cultures, blood cultures 1/2 GNRs (Psychrobacter)  continue ceftriaxone  c/w iv fluids  monitor mental status

## 2021-12-23 NOTE — PROGRESS NOTE ADULT - PROBLEM SELECTOR PLAN 9
TSH - 0.07  on levothyroxine 200mcg at home  hold for now until T3 and Free T4 is available -  unable to addon labs today, will draw in AM  Levothyroxine 175mcg (reduced dose)

## 2021-12-23 NOTE — PROGRESS NOTE ADULT - ATTENDING COMMENTS
#Bacteremia  #UTI  #Altered mental status  #Toxic metabolic encephalopathy  #AYAN likely pre-renal improved with IVF  #HTN  #Progressive dementia  #Physical deconditioning  #Hypothyroidism on levothyroxine, Ft14 and TSH elevated.   -- f/u repeat blood cx sent today  -- continue ceftriaxone, WBC improving  -- ID consult  -- PT recommending CRISTOBAL  -- decreased levothyroxine to 175mcg from 200mcg, will need repeat TSH check in 6 weeks .

## 2021-12-23 NOTE — PROGRESS NOTE ADULT - ASSESSMENT
Pt is a 62 y/o F with pmh of Bipolar disorder, HTN, HLD, Hypothyroidism, who presented to the ED BIBEMS due to AMS. 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 At the time of exam patient was AO x 0, arousable but lethargic, s/p Haldol and ativan for episode of agitation. Most information obtained from ED note and , Michelle [phone]. Pt is AO x 2 at baseline, for the past 2 weeks there have been changes in her activity and mental status, becoming more confused, not walking or doing much, having gait instability. Today patient was found to be sitting in her own urine, and when told so by the , she hadn't realized it. As per Michelle, no Fevers or chills reported, no sick contacts, no complains that patient expressed, other than the noticeable change in mental status. Patient was admitted at [facility] form 4/19/21 to 5/28/21 due to change in behavior, not able to care for herself and not taking her medications. Since then patient has required more assistance and they have people give her the medications twice a day. She also hasn't been eating well for the past 2 weeks.     In ED CT scan negative for acute disease, UA + for infection. WBC of 13, vira. S/p Rocephin, and haldold/ativan for episode of agitation. spoke with brother Jose Canela via phone at [phone], pt has been declined mentally due to dementia and also possible neuropathy per him , asking to have w/u for neuropathy. He also mentioned that she may need placement due to her ADLs and cognition

## 2021-12-23 NOTE — CONSULT NOTE ADULT - SUBJECTIVE AND OBJECTIVE BOX
HPI/course in hospital (history obtained from patient and chart)  Pt is a 61 year old female with PMHx of Bipolar disorder, HTN, HLD, dementia, Hypothyroidism, who presented with AMS. 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 When patient arrived to [facility] ED, patient was arousable but lethargic andreceived Haldol/ativan for episode of agitation. Most information obtained from ED note and , Michelle [phone]. Pt is AO x 2 at baseline. There have been changes in her activity and mental status for the past 2 weeks. Pt was becoming more confused, having unsteady gait, and being not active as usual. Pt was found to be sitting in her own urine and was not aware of it (witnessed by her ). As per Michelle, pt had no complaints including fevers, chills or sick contacts. Patient was admitted at [facility] form 4/19/21 to 5/28/21 due to change in behavior, not able to care for herself and not taking her medications. Since the last admission, she has required more assistance and they have people give her the medications twice a day. She also noted to have decrease in appetite x 2 weeks. Pt started on ceftriaxone since admission for UTI. ID asked to evaluated patient for bacteremia with Psychrobacter. At the time of ID consult, patient denies fever, chills, constipation, diarrhea, CP, SOB or any urinary complaints.        PAST MEDICAL & SURGICAL HISTORY:  Bipolar disorder  Dementia  Hypertension  Hyperlipidemia  Hypothyroidism    No significant past surgical history      MEDS:  amLODIPine   Tablet 10 milliGRAM(s) Oral daily  aspirin  chewable 81 milliGRAM(s) Oral daily  atorvastatin 40 milliGRAM(s) Oral at bedtime  benztropine 2 milliGRAM(s) Oral daily  cefTRIAXone   IVPB 1000 milliGRAM(s) IV Intermittent every 24 hours (D3)  dextrose 5% + sodium chloride 0.45%. 1000 milliLiter(s) IV Continuous <Continuous>  diVALproex  milliGRAM(s) Oral daily  diVALproex  milliGRAM(s) Oral at bedtime  FLUoxetine 60 milliGRAM(s) Oral daily  heparin   Injectable 5000 Unit(s) SubCutaneous every 8 hours  levothyroxine 175 MICROGram(s) Oral daily  lisinopril 20 milliGRAM(s) Oral daily  metoprolol succinate  milliGRAM(s) Oral daily  risperiDONE   Tablet 1 milliGRAM(s) Oral two times a day      ALLERGIES  No Known Allergies      SOCIAL HISTORY: Live with roommates; smokes 1pack/day, denies ETOH     FAMILY HISTORY: No pertinent family history in first degree relatives      ROS:     General: no fever or chills    Skin: no rashes  	  HEENT: no complaints    Respiratory and Thorax: denies SOB, cough  	  Cardiovascular: no CP	    Gastrointestinal: no N, V, diarrhea, constipation	     Genitourinary: no c/o urinary urgency, frequency or pain    Musculoskeletal:	 no complaints      PHYSICAL EXAM:    Vital Signs Last 24 Hrs  T(C): 36.8 (23 Dec 2021 13:59), Max: 36.9 (23 Dec 2021 05:16)  T(F): 98.2 (23 Dec 2021 13:59), Max: 98.5 (23 Dec 2021 05:16)  HR: 61 (23 Dec 2021 13:59) (61 - 87)  BP: 145/75 (23 Dec 2021 13:59) (105/61 - 146/85)  BP(mean): --  RR: 18 (23 Dec 2021 13:59) (18 - 19)  SpO2: 97% (23 Dec 2021 13:59) (97% - 100%)      Gen: WD WN female in NAD    HEENT: NC/AT; conj. clear; mouth - missing upper and lower teeth     Neck: supple    Lymph Nodes: no enlarged submand, cervical or supraclav LNs    Back: no CVA or verteb tenderness    Chest/Thorax: clear to auscultation    Cardiovascular: S1 S2 reg with no murmurs, gallops, rubs    Gastrointestinal: soft, nontender, BS present;     Genitourinary: Primafit in place     Rectal: no sacral or buttock breakdown    Extremities: no erythema or swelling; Onychomycosis on toes     Neurological: A+O x2-3; Cr n grossly intact; + reflexes in UE and LE; toes downgoing      Skin: no rashes     Psychiatric: affect appropriate    LABS/DIAGNOSTIC TESTS:                          9.4    13.05 )-----------( 341      ( 23 Dec 2021 08:36 )             30.0     WBC Count: 13.05 K/uL (12-23 @ 08:36)  WBC Count: 14.43 K/uL (12-22 @ 06:14)  WBC Count: 17.21 K/uL (12-21 @ 06:50)  WBC Count: 13.41 K/uL (12-20 @ 21:49)      12-23    147<H>  |  116<H>  |  24<H>  ----------------------------<  85  4.0   |  25  |  1.05    Ca    8.7      23 Dec 2021 08:36    Urinalysis + Microscopic Examination (12.20.21 @ 22:52)    Glucose Qualitative, Urine: Negative    Blood, Urine: Moderate    pH Urine: 6.0    Leukocyte Esterase Concentration: Moderate    Color: Yellow    Protein, Urine: 30 mg/dL    Urine Appearance: Clear    Urobilinogen: Negative    Specific Gravity: 1.005    Nitrite: Negative    Ketone - Urine: Negative    Bilirubin: Negative    Red Blood Cell - Urine: 5-10 /HPF    White Blood Cell - Urine: 11-25 /HPF    Epithelial Cells: Few /HPF    Bacteria: Few /HPF      CULTURES:     Culture - Urine (collected 12-21-21 @ 04:35)  Source: Clean Catch Clean Catch (Midstream)  Final Report (12-22-21 @ 08:06):    No growth    Culture - Blood (collected 12-21-21 @ 04:17)  Source: .Blood Blood  Gram Stain (12-22-21 @ 01:00):    Growth in aerobic bottle: Gram Negative Rods  Final Report (12-22-21 @ 20:49):    Growth in aerobic bottle: Most closely resembling    Psychrobacter species "Susceptibilities not performed"    **BCID performed. No targets detected.**    ***Blood Panel PCR results on this specimen are available    approximately 3 hours after the Gram stain result.***    Gram stain, PCR, and/or culture results may not always    correspond due to difference in methodologies.    ************************************************************    This PCR assay was performed by multiplex PCR. This    Assay tests for 66 bacterial and resistance gene targets.    Please refer to the [facility] test directory    at [email or web address] for details.    Culture - Blood (collected 12-21-21 @ 04:17)  Source: .Blood Blood  Preliminary Report (12-22-21 @ 05:01):    No growth to date.    RADIOLOGY    < from: US Kidney and Bladder (12.22.21 @ 09:45) >    ACC: [id number] EXAM:  US KIDNEYS AND BLADDER                          PROCEDURE DATE:  12/22/2021        INTERPRETATION:  CLINICAL INFORMATION: Acute kidney injury    COMPARISON: None available.    TECHNIQUE: Sonography of the kidneys and bladder.    FINDINGS:  There is bilateral increased renal cortical echogenicity.    Right kidney: 9.9 cm. No renal mass, hydronephrosis or calculi.    Left kidney: 9.3 cm. No hydronephrosis. Multiple tiny cysts measuring up   to 1.5 cm.    Urinary bladder: Prevoid bladder volume is 1439 cc. There is layering   debris within urinary bladder. The patient did not wish to void at this   time.    IMPRESSION:    Bilateral increased renal cortical echogenicity compatible with medical   renal disease.    Multipletiny left renal cysts measuring up to 1.5 cm.    Distended urinary bladder with layering debris. Correlate with   urinalysis. Patient did not wish to void during exam.  --------------------------------------------------------  < from: Xray Chest 1 View- PORTABLE-Urgent (Xray Chest 1 View- PORTABLE-Urgent .) (12.20.21 @ 22:13) >    ACC: [id number] EXAM:  XR CHEST PORTABLE URGENT 1V                          PROCEDURE DATE:  12/20/2021      INTERPRETATION:  AP chest on December 20, 2021 at 10:06 PM. Patient is   hypoxic.    COMPARISON: None available.    Heart normal for projection.    Lungs are clear.    IMPRESSION: Clear lungs.  -----------------------------------------------------  < from: CT Head No Cont (12.20.21 @ 21:55) >    ACC: [id number] EXAM:  CT BRAIN                          PROCEDURE DATE:  12/20/2021      INTERPRETATION:  PROCEDURE:  CT HEAD  [id number]    INDICATION:  ams.    COMPARISON: No priors for comparison.    TECHNIQUE:  Multiple consecutive axial images of the brain were obtained.   Coronal and sagittal reformats were created from the axial data set.    Iterative Reconstruction and automatic exposure control was used for dose   reduction.    CONTRAST:No intravenous contrast was administered.    FINDINGS:    BRAIN PARENCHYMA:  No acute hemorrhage. No mass effect or herniation.   Gray-white differentiation is maintained. There are periventricular and   subcortical white matter hypodensities, which are nonspecific, but likely   reflect mild microvascular ischemic disease.    VENTRICLES/EXTRA-AXIAL SPACES:  The ventricles and sulci are prominent in   size, compatible with mild generalized parenchymal volume loss. No   extra-axial fluid collections.    EXTRACRANIAL STRUCTURES: Normal bones and soft tissues. The visualized   paranasal sinuses are clear. The mastoid air cells are well aerated.    IMPRESSION:  1.  No acute intracranial disease.                         HPI/course in hospital (history obtained from patient and chart):  Pt is a 61 year old female with PMHx of Bipolar disorder, HTN, HLD, dementia, Hypothyroidism, who presented with AMS. 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 When patient arrived to [facility] ED, patient was arousable but lethargic andreceived Haldol/ativan for episode of agitation. Most information obtained from ED note and , Michelle [phone]. Pt is AO x 2 at baseline. There have been changes in her activity and mental status for the past 2 weeks. Pt was becoming more confused, having unsteady gait, and being not active as usual. Pt was found to be sitting in her own urine and was not aware of it (witnessed by her ). As per Michelle, pt had no complaints including fevers, chills or sick contacts. Patient was admitted at [facility] form 4/19/21 to 5/28/21 due to change in behavior, not able to care for herself and not taking her medications. Since the last admission, she has required more assistance and they have people give her the medications twice a day. She also was noted to have decrease in appetite x 2 weeks. Pt started on ceftriaxone since admission 12/20 for UTI. ID asked to evaluate patient for bacteremia with Psychrobacter. At the time of ID consult, patient denies fever, chills, constipation, diarrhea, CP, SOB or any urinary complaints.        PAST MEDICAL & SURGICAL HISTORY:  Bipolar disorder  Dementia  Hypertension  Hyperlipidemia  Hypothyroidism    No significant past surgical history      MEDS:  amLODIPine   Tablet 10 milliGRAM(s) Oral daily  aspirin  chewable 81 milliGRAM(s) Oral daily  atorvastatin 40 milliGRAM(s) Oral at bedtime  benztropine 2 milliGRAM(s) Oral daily  cefTRIAXone   IVPB 1000 milliGRAM(s) IV Intermittent every 24 hours (D3)  dextrose 5% + sodium chloride 0.45%. 1000 milliLiter(s) IV Continuous <Continuous>  diVALproex  milliGRAM(s) Oral daily  diVALproex  milliGRAM(s) Oral at bedtime  FLUoxetine 60 milliGRAM(s) Oral daily  heparin   Injectable 5000 Unit(s) SubCutaneous every 8 hours  levothyroxine 175 MICROGram(s) Oral daily  lisinopril 20 milliGRAM(s) Oral daily  metoprolol succinate  milliGRAM(s) Oral daily  risperiDONE   Tablet 1 milliGRAM(s) Oral two times a day      ALLERGIES  No Known Allergies    SOCIAL HISTORY: Lives with roommates; smokes 1pack/day, denies ETOH     FAMILY HISTORY: No pertinent family history in first degree relatives      ROS:     General: no fever or chills    Skin: no rashes  	  HEENT: no complaints    Respiratory and Thorax: denies SOB, cough  	  Cardiovascular: no CP	    Gastrointestinal: no N, V, diarrhea, constipation	     Genitourinary: no c/o urinary urgency, frequency or pain    Musculoskeletal:	 no complaints      PHYSICAL EXAM:    Vital Signs Last 24 Hrs  T(C): 36.8 (23 Dec 2021 13:59), Max: 36.9 (23 Dec 2021 05:16)  T(F): 98.2 (23 Dec 2021 13:59), Max: 98.5 (23 Dec 2021 05:16)  HR: 61 (23 Dec 2021 13:59) (61 - 87)  BP: 145/75 (23 Dec 2021 13:59) (105/61 - 146/85)  BP(mean): --  RR: 18 (23 Dec 2021 13:59) (18 - 19)  SpO2: 97% (23 Dec 2021 13:59) (97% - 100%)      Gen: WD WN female in NAD    HEENT: NC/AT; conj. 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 [place]; did not know exact date; Cr n grossly intact; + reflexes in UE and LE; toes downgoing      Skin: no rashes     Psychiatric: affect appropriate      LABS/DIAGNOSTIC TESTS:                        9.4    13.05 )-----------( 341      ( 23 Dec 2021 08:36 )             30.0     WBC Count: 13.05 K/uL (12-23 @ 08:36)  WBC Count: 14.43 K/uL (12-22 @ 06:14)  WBC Count: 17.21 K/uL (12-21 @ 06:50)  WBC Count: 13.41 K/uL (12-20 @ 21:49)      12-23    147<H>  |  116<H>  |  24<H>  ----------------------------<  85  4.0   |  25  |  1.05    Ca    8.7      23 Dec 2021 08:36    Urinalysis + Microscopic Examination (12.20.21 @ 22:52)    Glucose Qualitative, Urine: Negative    Blood, Urine: Moderate    pH Urine: 6.0    Leukocyte Esterase Concentration: Moderate    Color: Yellow    Protein, Urine: 30 mg/dL    Urine Appearance: Clear    Urobilinogen: Negative    Specific Gravity: 1.005    Nitrite: Negative    Ketone - Urine: Negative    Bilirubin: Negative    Red Blood Cell - Urine: 5-10 /HPF    White Blood Cell - Urine: 11-25 /HPF    Epithelial Cells: Few /HPF    Bacteria: Few /HPF      CULTURES:     Culture - Urine (collected 12-21-21 @ 04:35)  Source: Clean Catch Clean Catch (Midstream)  Final Report (12-22-21 @ 08:06):    No growth    Culture - Blood (collected 12-21-21 @ 04:17)  Source: .Blood Blood  Gram Stain (12-22-21 @ 01:00):    Growth in aerobic bottle: Gram Negative Rods  Final Report (12-22-21 @ 20:49):    Growth in aerobic bottle: Most closely resembling    Psychrobacter species "Susceptibilities not performed"    **BCID performed. No targets detected.**    ***Blood Panel PCR results on this specimen are available    approximately 3 hours after the Gram stain result.***    Gram stain, PCR, and/or culture results may not always    correspond due to difference in methodologies.    ************************************************************    This PCR assay was performed by multiplex PCR. This    Assay tests for 66 bacterial and resistance gene targets.    Please refer to the [facility] test directory    at [email or web address] for details.    Culture - Blood (collected 12-21-21 @ 04:17)  Source: .Blood Blood  Preliminary Report (12-22-21 @ 05:01):    No growth to date.    RADIOLOGY    < from: US Kidney and Bladder (12.22.21 @ 09:45) >    ACC: [id number] EXAM:  US KIDNEYS AND BLADDER                          PROCEDURE DATE:  12/22/2021        INTERPRETATION:  CLINICAL INFORMATION: Acute kidney injury    COMPARISON: None available.    TECHNIQUE: Sonography of the kidneys and bladder.    FINDINGS:  There is bilateral increased renal cortical echogenicity.    Right kidney: 9.9 cm. No renal mass, hydronephrosis or calculi.    Left kidney: 9.3 cm. No hydronephrosis. Multiple tiny cysts measuring up   to 1.5 cm.    Urinary bladder: Prevoid bladder volume is 1439 cc. There is layering   debris within urinary bladder. The patient did not wish to void at this   time.    IMPRESSION:    Bilateral increased renal cortical echogenicity compatible with medical   renal disease.    Multipletiny left renal cysts measuring up to 1.5 cm.    Distended urinary bladder with layering debris. Correlate with   urinalysis. Patient did not wish to void during exam.  --------------------------------------------------------  < from: Xray Chest 1 View- PORTABLE-Urgent (Xray Chest 1 View- PORTABLE-Urgent .) (12.20.21 @ 22:13) >    ACC: [id number] EXAM:  XR CHEST PORTABLE URGENT 1V                          PROCEDURE DATE:  12/20/2021      INTERPRETATION:  AP chest on December 20, 2021 at 10:06 PM. Patient is   hypoxic.    COMPARISON: None available.    Heart normal for projection.    Lungs are clear.    IMPRESSION: Clear lungs.  -----------------------------------------------------  < from: CT Head No Cont (12.20.21 @ 21:55) >    ACC: [id number] EXAM:  CT BRAIN                          PROCEDURE DATE:  12/20/2021      INTERPRETATION:  PROCEDURE:  CT HEAD  [id number]    INDICATION:  ams.    COMPARISON: No priors for comparison.    TECHNIQUE:  Multiple consecutive axial images of the brain were obtained.   Coronal and sagittal reformats were created from the axial data set.    Iterative Reconstruction and automatic exposure control was used for dose   reduction.    CONTRAST:No intravenous contrast was administered.    FINDINGS:    BRAIN PARENCHYMA:  No acute hemorrhage. No mass effect or herniation.   Gray-white differentiation is maintained. There are periventricular and   subcortical white matter hypodensities, which are nonspecific, but likely   reflect mild microvascular ischemic disease.    VENTRICLES/EXTRA-AXIAL SPACES:  The ventricles and sulci are prominent in   size, compatible with mild generalized parenchymal volume loss. No   extra-axial fluid collections.    EXTRACRANIAL STRUCTURES: Normal bones and soft tissues. The visualized   paranasal sinuses are clear. The mastoid air cells are well aerated.    IMPRESSION:  1.  No acute intracranial disease.

## 2021-12-24 LAB
-  COAGULASE NEGATIVE STAPHYLOCOCCUS: SIGNIFICANT CHANGE UP
ANION GAP SERPL CALC-SCNC: 7 MMOL/L — SIGNIFICANT CHANGE UP (ref 5–17)
BUN SERPL-MCNC: 15 MG/DL — SIGNIFICANT CHANGE UP (ref 7–18)
CALCIUM SERPL-MCNC: 8.6 MG/DL — SIGNIFICANT CHANGE UP (ref 8.4–10.5)
CHLORIDE SERPL-SCNC: 114 MMOL/L — HIGH (ref 96–108)
CO2 SERPL-SCNC: 25 MMOL/L — SIGNIFICANT CHANGE UP (ref 22–31)
CREAT SERPL-MCNC: 0.91 MG/DL — SIGNIFICANT CHANGE UP (ref 0.5–1.3)
GLUCOSE BLDC GLUCOMTR-MCNC: 91 MG/DL — SIGNIFICANT CHANGE UP (ref 70–99)
GLUCOSE BLDC GLUCOMTR-MCNC: 98 MG/DL — SIGNIFICANT CHANGE UP (ref 70–99)
GLUCOSE SERPL-MCNC: 85 MG/DL — SIGNIFICANT CHANGE UP (ref 70–99)
GRAM STN FLD: SIGNIFICANT CHANGE UP
GRAM STN FLD: SIGNIFICANT CHANGE UP
HCT VFR BLD CALC: 30.4 % — LOW (ref 34.5–45)
HGB BLD-MCNC: 9.3 G/DL — LOW (ref 11.5–15.5)
MCHC RBC-ENTMCNC: 29.6 PG — SIGNIFICANT CHANGE UP (ref 27–34)
MCHC RBC-ENTMCNC: 30.6 GM/DL — LOW (ref 32–36)
MCV RBC AUTO: 96.8 FL — SIGNIFICANT CHANGE UP (ref 80–100)
METHOD TYPE: SIGNIFICANT CHANGE UP
NRBC # BLD: 0 /100 WBCS — SIGNIFICANT CHANGE UP (ref 0–0)
PLATELET # BLD AUTO: 317 K/UL — SIGNIFICANT CHANGE UP (ref 150–400)
POTASSIUM SERPL-MCNC: 4.1 MMOL/L — SIGNIFICANT CHANGE UP (ref 3.5–5.3)
POTASSIUM SERPL-SCNC: 4.1 MMOL/L — SIGNIFICANT CHANGE UP (ref 3.5–5.3)
RBC # BLD: 3.14 M/UL — LOW (ref 3.8–5.2)
RBC # FLD: 12.2 % — SIGNIFICANT CHANGE UP (ref 10.3–14.5)
SODIUM SERPL-SCNC: 146 MMOL/L — HIGH (ref 135–145)
WBC # BLD: 12.51 K/UL — HIGH (ref 3.8–10.5)
WBC # FLD AUTO: 12.51 K/UL — HIGH (ref 3.8–10.5)

## 2021-12-24 PROCEDURE — 99233 SBSQ HOSP IP/OBS HIGH 50: CPT

## 2021-12-24 PROCEDURE — 74018 RADEX ABDOMEN 1 VIEW: CPT | Mod: 26

## 2021-12-24 RX ORDER — LISINOPRIL 2.5 MG/1
40 TABLET ORAL DAILY
Refills: 0 | Status: DISCONTINUED | OUTPATIENT
Start: 2021-12-25 | End: 2022-01-03

## 2021-12-24 RX ADMIN — DIVALPROEX SODIUM 500 MILLIGRAM(S): 500 TABLET, DELAYED RELEASE ORAL at 21:41

## 2021-12-24 RX ADMIN — Medication 60 MILLIGRAM(S): at 13:16

## 2021-12-24 RX ADMIN — RISPERIDONE 1 MILLIGRAM(S): 4 TABLET ORAL at 17:44

## 2021-12-24 RX ADMIN — AMLODIPINE BESYLATE 10 MILLIGRAM(S): 2.5 TABLET ORAL at 05:21

## 2021-12-24 RX ADMIN — RISPERIDONE 1 MILLIGRAM(S): 4 TABLET ORAL at 05:21

## 2021-12-24 RX ADMIN — Medication 175 MICROGRAM(S): at 05:21

## 2021-12-24 RX ADMIN — Medication 2 MILLIGRAM(S): at 13:13

## 2021-12-24 RX ADMIN — HEPARIN SODIUM 5000 UNIT(S): 5000 INJECTION INTRAVENOUS; SUBCUTANEOUS at 21:41

## 2021-12-24 RX ADMIN — HEPARIN SODIUM 5000 UNIT(S): 5000 INJECTION INTRAVENOUS; SUBCUTANEOUS at 05:20

## 2021-12-24 RX ADMIN — HEPARIN SODIUM 5000 UNIT(S): 5000 INJECTION INTRAVENOUS; SUBCUTANEOUS at 13:16

## 2021-12-24 RX ADMIN — LISINOPRIL 20 MILLIGRAM(S): 2.5 TABLET ORAL at 05:21

## 2021-12-24 RX ADMIN — Medication 81 MILLIGRAM(S): at 13:12

## 2021-12-24 RX ADMIN — Medication 100 MILLIGRAM(S): at 05:21

## 2021-12-24 RX ADMIN — DIVALPROEX SODIUM 250 MILLIGRAM(S): 500 TABLET, DELAYED RELEASE ORAL at 13:18

## 2021-12-24 RX ADMIN — CEFTRIAXONE 100 MILLIGRAM(S): 500 INJECTION, POWDER, FOR SOLUTION INTRAMUSCULAR; INTRAVENOUS at 13:15

## 2021-12-24 RX ADMIN — ATORVASTATIN CALCIUM 40 MILLIGRAM(S): 80 TABLET, FILM COATED ORAL at 21:41

## 2021-12-24 NOTE — DISCHARGE NOTE PROVIDER - CARE PROVIDER_API CALL
PCP, PCP  Phone: (   )    -  Fax: (   )    -  Follow Up Time: 1 week    RIAN Velazco, Kimi GUERRERO  Transitional [facility] for [facility]  [address] [facility]  Phone: (   )    -  Fax: (   )    -  Established Patient  Follow Up Time: 2 weeks    Kimi Machado NP  [address] [facility]  [address]  Phone: [phone]  Fax: (   )    -  Follow Up Time: 1 week

## 2021-12-24 NOTE — PROGRESS NOTE ADULT - PROBLEM SELECTOR PLAN 1
pt with ams, progressively worsening for 1 week  has hx of bipolar disorder and hospitalizations due changes in behavior (last one in [place])  CTH negative for acute pathology, no focal neurological findings other than ams, but exam difficult to assess in current status.   likely due to infection (+UA, incontinence) and also worsening of dementia   Continue ceftriaxone for now   UCx: NGTD  1st BCx grew GNRs Psychrobacter species  2nd BCx Coagulase negative Staph  consult ID for ABx guidance given Psychrobacter species  ID consulted - Dr. Heart  completed D5 1/2 NS

## 2021-12-24 NOTE — PROGRESS NOTE ADULT - ASSESSMENT
Pt is a 62 y/o F with pmh of Bipolar disorder, HTN, HLD, Hypothyroidism, who presented to the ED BIBEMS due to AMS. 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 At the time of exam patient was AO x 0, arousable but lethargic, s/p Haldol and ativan for episode of agitation. Most information obtained from ED note and , Michelle [phone]. Pt is AO x 2 at baseline, for the past 2 weeks there have been changes in her activity and mental status, becoming more confused, not walking or doing much, having gait instability. Today patient was found to be sitting in her own urine, and when told so by the , she hadn't realized it. As per Michelle, no Fevers or chills reported, no sick contacts, no complains that patient expressed, other than the noticeable change in mental status. Patient was admitted at [facility] form 4/19/21 to 5/28/21 due to change in behavior, not able to care for herself and not taking her medications. Since then patient has required more assistance and they have people give her the medications twice a day. She also hasn't been eating well for the past 2 weeks.     In ED CT scan negative for acute disease, UA + for infection. WBC of 13, vira. S/p Rocephin, and haldold/ativan for episode of agitation. spoke with brother Jose Canela via phone at [phone], pt has been declined mentally due to dementia and also possible neuropathy per him , asking to have w/u for neuropathy. He also mentioned that she may need placement due to her ADLs and cognition

## 2021-12-24 NOTE — PROGRESS NOTE ADULT - PROBLEM SELECTOR PLAN 4
Resolved  pt with cr of 2.69, unknown baseline   likely pre-renal in setting of poor po intake at home as per   repeat Cr 1.05  continue IVF   BMP in AM   AYAN resolved, continue to trend Cr  lisinopril for HTN, monitor Cr

## 2021-12-24 NOTE — DISCHARGE NOTE PROVIDER - NSDCCPCAREPLAN_GEN_ALL_CORE_FT
PRINCIPAL DISCHARGE DIAGNOSIS  Diagnosis: Encephalopathy  Assessment and Plan of Treatment:       SECONDARY DISCHARGE DIAGNOSES  Diagnosis: UTI (urinary tract infection)  Assessment and Plan of Treatment:     Diagnosis: Bipolar disorder  Assessment and Plan of Treatment: You have a history of bipolar disease, please continue your home medications.    Diagnosis: AYAN (acute kidney injury)  Assessment and Plan of Treatment:   Pt with cr of 2.69, normal baseline. Likely pre-renal in setting of poor po intake at home as per . Creatinine now normal.     PRINCIPAL DISCHARGE DIAGNOSIS  Diagnosis: Encephalopathy  Assessment and Plan of Treatment: You presented with altered mental status, progressively worsening for 1 week, CT head was negative for acute pathology. You were found to have a urinary tract infection on your urinanalysis. You were started on IV antibiotics, Ceftriaxone. Your urine culture showed no growth. However, your blood cultures grew Psychrobacter species. 2nd Blood culture grew Coagulase negative Staphylococcus, which is most likely contamination of the blood culture.. Infectious Disease doctor, Dr. Heart, was consulted, who recommended a total of 7 days of ceftriaxone. Repeat blood cultures were ordered showing no growth. You are now back to your baseline mental status.      SECONDARY DISCHARGE DIAGNOSES  Diagnosis: UTI (urinary tract infection)  Assessment and Plan of Treatment: - You were found to have a urinary tract infection on your admission.   - You were treated with 7 days of iv antibiotics in the hospital with the goal of clearing this infection   - Urinary tract infections can cause weakness, confusion, or spread to other organ systems in the body.  - If you experience continued symptoms of infection (fever, chills, weakness, or burning with urination), please follow up with your primary care provider.    Diagnosis: Bipolar disorder  Assessment and Plan of Treatment: You have a history of bipolar disease, please continue your home medications.    Diagnosis: AYAN (acute kidney injury)  Assessment and Plan of Treatment: You presented to the hospital with an acute kidney injury. Your creatinine was elevated at 2.69. The underlying cause of your kidney injury was most likely secondary to dehydration and the urinary tract infection you had. After recieving IV fluids and treatment for your urinary tract infection, your creatinine returned back to its baseline. Please continue to stay hydrated.     PRINCIPAL DISCHARGE DIAGNOSIS  Diagnosis: Encephalopathy  Assessment and Plan of Treatment: You presented with altered mental status, progressively worsening for 1 week, CT head was negative for acute pathology. You were found to have a urinary tract infection on your urinanalysis. You were started on IV antibiotics, Ceftriaxone. Your urine culture showed no growth. However, your blood cultures grew Psychrobacter species. 2nd Blood culture grew Coagulase negative Staphylococcus, which is most likely contamination of the blood culture.. Infectious Disease doctor, Dr. Heart, was consulted, who recommended a total of 7 days of ceftriaxone. Repeat blood cultures were ordered showing no growth. You are now back to your baseline mental status.      SECONDARY DISCHARGE DIAGNOSES  Diagnosis: UTI (urinary tract infection)  Assessment and Plan of Treatment: - You were found to have a urinary tract infection on your admission.   - You were treated with 7 days of iv antibiotics in the hospital with the goal of clearing this infection   - Urinary tract infections can cause weakness, confusion, or spread to other organ systems in the body.  - If you experience continued symptoms of infection (fever, chills, weakness, or burning with urination), please follow up with your primary care provider.  P    Diagnosis: Bipolar disorder  Assessment and Plan of Treatment: You have a history of bipolar disease, please continue your home medications.    Diagnosis: AYAN (acute kidney injury)  Assessment and Plan of Treatment: You presented to the hospital with an acute kidney injury. Your creatinine was elevated at 2.69. The underlying cause of your kidney injury was most likely secondary to dehydration and the urinary tract infection you had. After recieving IV fluids and treatment for your urinary tract infection, your creatinine returned back to its baseline. Please continue to stay hydrated.     PRINCIPAL DISCHARGE DIAGNOSIS  Diagnosis: Encephalopathy  Assessment and Plan of Treatment: You presented with altered mental status, progressively worsening for 1 week, CT head was negative for acute pathology. You were found to have a urinary tract infection on your urinanalysis. You were started on IV antibiotics, Ceftriaxone. Your urine culture showed no growth. However, your blood cultures grew Psychrobacter species. 2nd Blood culture grew Coagulase negative Staphylococcus, which is most likely contamination of the blood culture.. Infectious Disease doctor, Dr. Heart, was consulted, who recommended a total of 7 days of ceftriaxone. Repeat blood cultures were ordered showing no growth. You are now back to your baseline mental status.  Please continue taking Ceftin 250 twice a day for XXX days until XXXX      SECONDARY DISCHARGE DIAGNOSES  Diagnosis: UTI (urinary tract infection)  Assessment and Plan of Treatment: - You were found to have a urinary tract infection on your admission.   - You were treated with 7 days of iv antibiotics in the hospital with the goal of clearing this infection   - Urinary tract infections can cause weakness, confusion, or spread to other organ systems in the body.  - If you experience continued symptoms of infection (fever, chills, weakness, or burning with urination), please follow up with your primary care provider.  Please continue taking Ceftin 250 twice a day for XXX days until XXXX    Diagnosis: Bipolar disorder  Assessment and Plan of Treatment: You have a history of bipolar disease, please continue your home medications.    Diagnosis: AYAN (acute kidney injury)  Assessment and Plan of Treatment: You presented to the hospital with an acute kidney injury. Your creatinine was elevated at 2.69. The underlying cause of your kidney injury was most likely secondary to dehydration and the urinary tract infection you had. After recieving IV fluids and treatment for your urinary tract infection, your creatinine returned back to its baseline. Please continue to stay hydrated.     PRINCIPAL DISCHARGE DIAGNOSIS  Diagnosis: Encephalopathy  Assessment and Plan of Treatment: You presented with altered mental status, progressively worsening for 1 week, CT head was negative for acute pathology. You were found to have a urinary tract infection on your urinanalysis. You were started on IV antibiotics, Ceftriaxone. Your urine culture showed no growth. However, your blood cultures grew Psychrobacter species. 2nd Blood culture grew Coagulase negative Staphylococcus, which is most likely contamination of the blood culture.. Infectious Disease doctor, Dr. Heart, was consulted, who recommended a total of 7 days of ceftriaxone. Repeat blood cultures were ordered showing no growth. You are now back to your baseline mental status.  Please continue taking Ceftin 250 twice a day for 3  days      SECONDARY DISCHARGE DIAGNOSES  Diagnosis: UTI (urinary tract infection)  Assessment and Plan of Treatment: - You were found to have a urinary tract infection on your admission.   - You were treated with 7 days of iv antibiotics in the hospital with the goal of clearing this infection   - Urinary tract infections can cause weakness, confusion, or spread to other organ systems in the body.  - If you experience continued symptoms of infection (fever, chills, weakness, or burning with urination), please follow up with your primary care provider.  Please continue taking Ceftin 250 twice a day for 3 days    Diagnosis: Bipolar disorder  Assessment and Plan of Treatment: You have a history of bipolar disease, please continue your home medications.    Diagnosis: AYAN (acute kidney injury)  Assessment and Plan of Treatment: You presented to the hospital with an acute kidney injury. Your creatinine was elevated at 2.69. The underlying cause of your kidney injury was most likely secondary to dehydration and the urinary tract infection you had. After recieving IV fluids and treatment for your urinary tract infection, your creatinine returned back to its baseline. Please continue to stay hydrated.     PRINCIPAL DISCHARGE DIAGNOSIS  Diagnosis: Encephalopathy  Assessment and Plan of Treatment: You presented with altered mental status, progressively worsening for 1 week, CT head was negative for acute pathology. You were found to have a urinary tract infection on your urinanalysis. You were started on IV antibiotics, Ceftriaxone. Your urine culture showed no growth. However, your blood cultures grew Psychrobacter species. 2nd Blood culture grew Coagulase negative Staphylococcus, which is most likely contamination of the blood culture.. Infectious Disease doctor, Dr. Heart, was consulted, who recommended a total of 7 days of ceftriaxone. Repeat blood cultures were ordered showing no growth. You are now back to your baseline mental status.  You completed abx while you were here.      SECONDARY DISCHARGE DIAGNOSES  Diagnosis: HTN (hypertension)  Assessment and Plan of Treatment: Your BP was low when yoou arived. Your medications amlodipine dose was adjusted to 5mg from 10mg and lisinipril has been held. Please follow up with your PCP to reevaluate your medicaitons.   Follow Low salt diet  Activity as tolerated.  Take all medication as prescribed.  Follow up with your medical doctor for routine blood pressure monitoring at your next visit.  Notify your doctor if you have any of the following symptoms:   Dizziness, Lightheadedness, Blurry vision, Headache, Chest pain, Shortness of breath      Diagnosis: UTI (urinary tract infection)  Assessment and Plan of Treatment: - You were found to have a urinary tract infection on your admission.   - You were treated with 7 days of iv antibiotics in the hospital with the goal of clearing this infection   - Urinary tract infections can cause weakness, confusion, or spread to other organ systems in the body.  - If you experience continued symptoms of infection (fever, chills, weakness, or burning with urination), please follow up with your primary care provider.  - YOu completed abx here while you were here.    Diagnosis: Bipolar disorder  Assessment and Plan of Treatment: You have a history of bipolar disease, please continue your home medications.    Diagnosis: AYAN (acute kidney injury)  Assessment and Plan of Treatment: You presented to the hospital with an acute kidney injury. Your creatinine was elevated at 2.69. The underlying cause of your kidney injury was most likely secondary to dehydration and the urinary tract infection you had. After recieving IV fluids and treatment for your urinary tract infection, your creatinine returned back to its baseline. Please continue to stay hydrated.

## 2021-12-24 NOTE — DISCHARGE NOTE PROVIDER - HOSPITAL COURSE
Pt is a 62 y/o F with pmh of Bipolar disorder, HTN, HLD, Hypothyroidism, who presented to the ED [facility] due to AMS. 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 At the time of exam patient was AO x 0, arousable but lethargic, s/p Haldol and ativan for episode of agitation. Most information obtained from ED note and , Michelle [phone]. Pt is AO x 2 at baseline, for the past 2 weeks there have been changes in her activity and mental status, becoming more confused, not walking or doing much, having gait instability. Today patient was found to be sitting in her own urine, and when told so by the , she hadn't realized it. As per Michelle, no Fevers or chills reported, no sick contacts, no complains that patient expressed, other than the noticeable change in mental status. Patient was admitted at [facility] form 4/19/21 to 5/28/21 due to change in behavior, not able to care for herself and not taking her medications. Since then patient has required more assistance and they have people give her the medications twice a day. She also hasn't been eating well for the past 2 weeks.     In ED CT scan negative for acute disease, UA + for infection. WBC of 13, vira. S/p Rocephin, and haldol/ativan for episode of agitation.       Pt presented with ams, progressively worsening for 1 week, CTH negative for acute pathology, no focal neurological findings other than ams, but exam difficult to assess in current status. Likely due to infection (+UA and incontinence). Pt started on Ceftriaxone. UCx: NGTD. 1st BCx grew GNRs Psychrobacter species. 2nd BCx Coagulase negative Staph. ID consulted - Dr. Heart. Repeat blood cultures were ordered showing XXXX    Patient now back to her baseline mental status.    Brother reported that pt's dementia is worsening now x 2 weeks. Physical therapy recommended XXXX    Pt with cr of 2.69, normal baseline. Likely pre-renal in setting of poor po intake at home as per . Creatinine now normal.    Pt has hx of bipolar disorder on multiple meds. Hx of non compliance, but now workers of the program she lives in are giving her the medications.  continue divalproex 250mg QD 500mg QHS, prozac 60mg QD, risperdal 1mg BID home dose.      Pt has pmh of htn on amlodipine, lisinopril, and toprol. Resumed all home medications      Given patient's improved clinical status and current hemodynamic stability, decision was made to discharge the patient.  Patient is stable for discharge per attending and is advised to follow up with PCP as outpatient  Please refer to patient's complete medical chart with documents for a full hospital course, for this is only a brief summary.     Pt is a 60 y/o F with pmh of Bipolar disorder, HTN, HLD, Hypothyroidism, [facility] 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 At the time of exam patient was AO x 0, arousable but lethargic, s/p Haldol and ativan for episode of agitation. Most information obtained from ED note and , Michelle [phone]. Pt is AO x 2 at baseline, for the past 2 weeks there have been changes in her activity and mental status, becoming more confused, not walking or doing much, having gait instability. Today patient was found to be sitting in her own urine, and when told so by the , she hadn't realized it. As per Michelle, no Fevers or chills reported, no sick contacts, no complains that patient expressed, other than the noticeable change in mental status. Patient was admitted at [facility] form 4/19/21 to 5/28/21 due to change in behavior, not able to care for herself and not taking her medications. Since then patient has required more assistance and they have people give her the medications twice a day. She also hasn't been eating well for the past 2 weeks.     In ED CT scan negative for acute disease, UA + for infection. WBC of 13, vira. S/p Rocephin, and haldol/ativan for episode of agitation.       Pt presented with ams, progressively worsening for 1 week, CTH negative for acute pathology, no focal neurological findings other than ams, but exam difficult to assess in current status. Likely due to infection (+UA and incontinence). Pt started on Ceftriaxone. UCx: NGTD. 1st BCx grew GNRs Psychrobacter species. 2nd BCx Coagulase negative Staph. ID consulted - Dr. Heart. Repeat blood cultures were ordered showing no growth    Patient now back to her baseline mental status.    Brother reported that pt's dementia is worsening now x 2 weeks. Physical therapy recommended [facility]    Pt with cr of 2.69, normal baseline. Likely pre-renal in setting of poor po intake at home as per . Creatinine now normal.    Pt has hx of bipolar disorder on multiple meds. Hx of non compliance, but now workers of the program she lives in are giving her the medications.  continue divalproex 250mg QD 500mg QHS, prozac 60mg QD, risperdal 1mg BID home dose.      Pt has pmh of htn on amlodipine, lisinopril, and toprol. Resumed all home medications      Given patient's improved clinical status and current hemodynamic stability, decision was made to discharge the patient.  Patient is stable for discharge per attending and is advised to follow up with PCP as outpatient  Please refer to patient's complete medical chart with documents for a full hospital course, for this is only a brief summary.     Pt is a 60 y/o F with pmh of Bipolar disorder, HTN, HLD, Hypothyroidism, [facility] 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 At the time of exam patient was AO x 0, arousable but lethargic, s/p Haldol and ativan for episode of agitation. Most information obtained from ED note and , Michelle [phone]. Pt is AO x 2 at baseline, for the past 2 weeks there have been changes in her activity and mental status, becoming more confused, not walking or doing much, having gait instability. Today patient was found to be sitting in her own urine, and when told so by the , she hadn't realized it. As per Michelle, no Fevers or chills reported, no sick contacts, no complains that patient expressed, other than the noticeable change in mental status. Patient was admitted at [facility] form 4/19/21 to 5/28/21 due to change in behavior, not able to care for herself and not taking her medications. Since then patient has required more assistance and they have people give her the medications twice a day. She also hasn't been eating well for the past 2 weeks.     In ED CT scan negative for acute disease, UA + for infection. WBC of 13, vira. S/p Rocephin, and haldol/ativan for episode of agitation.       Pt presented with ams, progressively worsening for 1 week, CTH negative for acute pathology, no focal neurological findings other than ams, but exam difficult to assess in current status. Likely due to infection (+UA and incontinence). Pt started on Ceftriaxone. UCx: NGTD. 1st BCx grew GNRs Psychrobacter species. 2nd BCx Coagulase negative Staph. ID consulted - Dr. Heart. Repeat blood cultures were ordered showing no growth. Completed 5 days of Rocephin and will be discharged on Ceftin until 1/3/22.    Patient now back to her baseline mental status.    Brother reported that pt's dementia is worsening now x 2 weeks. Physical therapy recommended [facility]    Pt with cr of 2.69, normal baseline. Likely pre-renal in setting of poor po intake at home as per . Creatinine now normal.    Pt has hx of bipolar disorder on multiple meds. Hx of non compliance, but now workers of the program she lives in are giving her the medications.  continue divalproex 250mg QD 500mg QHS, prozac 60mg QD, risperdal 1mg BID home dose.      Pt has pmh of htn on amlodipine, lisinopril, and toprol. Resumed all home medications      Given patient's improved clinical status and current hemodynamic stability, decision was made to discharge the patient.  Patient is stable for discharge per attending and is advised to follow up with PCP as outpatient  Please refer to patient's complete medical chart with documents for a full hospital course, for this is only a brief summary.     Pt is a 60 y/o F with pmh of Bipolar disorder, HTN, HLD, Hypothyroidism, [facility] 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 At the time of exam patient was AO x 0, arousable but lethargic, s/p Haldol and ativan for episode of agitation. Most information obtained from ED note and , Michelle [phone]. Pt is AO x 2 at baseline, for the past 2 weeks there have been changes in her activity and mental status, becoming more confused, not walking or doing much, having gait instability. Today patient was found to be sitting in her own urine, and when told so by the , she hadn't realized it. As per Michelle, no Fevers or chills reported, no sick contacts, no complains that patient expressed, other than the noticeable change in mental status. Patient was admitted at [facility] form 4/19/21 to 5/28/21 due to change in behavior, not able to care for herself and not taking her medications. Since then patient has required more assistance and they have people give her the medications twice a day. She also hasn't been eating well for the past 2 weeks.     In ED CT scan negative for acute disease, UA + for infection. WBC of 13, vira. S/p Rocephin, and haldol/ativan for episode of agitation.       Pt presented with ams, progressively worsening for 1 week, CTH negative for acute pathology, no focal neurological findings other than ams, but exam difficult to assess in current status. Likely due to infection (+UA and incontinence). Pt started on Ceftriaxone. UCx: NGTD. 1st BCx grew GNRs Psychrobacter species. 2nd BCx Coagulase negative Staph. ID consulted - Dr. Heart. Repeat blood cultures were ordered showing no growth. Completed 5 days of Rocephin and will be discharged on Ceftin until 1/3/22.    Patient now back to her baseline mental status.    Brother reported that pt's dementia is worsening now x 2 weeks. Cristian MRI with no acute findings.  Physical therapy recommended [facility]    Pt with cr of 2.69, normal baseline. Likely pre-renal in setting of poor po intake at home as per . Creatinine now normal.    Pt has hx of bipolar disorder on multiple meds. Hx of non compliance, but now workers of the program she lives in are giving her the medications.  continue divalproex 250mg QD 500mg QHS, prozac 60mg QD, risperdal 1mg BID home dose.      Pt has pmh of htn on amlodipine, lisinopril, and toprol. Resumed all home medications      Given patient's improved clinical status and current hemodynamic stability, decision was made to discharge the patient.  Patient is stable for discharge per attending and is advised to follow up with PCP as outpatient  Please refer to patient's complete medical chart with documents for a full hospital course, for this is only a brief summary.

## 2021-12-24 NOTE — DISCHARGE NOTE PROVIDER - NSDCMRMEDTOKEN_GEN_ALL_CORE_FT
amLODIPine 10 mg oral tablet: 1 tab(s) orally once a day  aspirin 81 mg oral delayed release tablet: 1 tab(s) orally once a day  benztropine 2 mg oral tablet: 1 tab(s) orally once a day  divalproex sodium 250 mg oral delayed release tablet: 1 tab(s) orally once a day  divalproex sodium 500 mg oral delayed release tablet: 1 tab(s) orally once a day (at bedtime)  levothyroxine 200 mcg (0.2 mg) oral tablet: 1 tab(s) orally once a day  lisinopril 40 mg oral tablet: 1 tab(s) orally once a day  pravastatin 40 mg oral tablet: 1 tab(s) orally once a day  PROzac 20 mg oral capsule: 3 cap(s) orally once a day  RisperDAL 1 mg oral tablet: 1 tab(s) orally 2 times a day  Toprol- mg oral tablet, extended release: 1 tab(s) orally once a day   amLODIPine 10 mg oral tablet: 1 tab(s) orally once a day  aspirin 81 mg oral delayed release tablet: 1 tab(s) orally once a day  benztropine 2 mg oral tablet: 1 tab(s) orally once a day  cefuroxime 250 mg oral tablet: 1 tab(s) orally every 12 hours  divalproex sodium 250 mg oral delayed release tablet: 1 tab(s) orally once a day  divalproex sodium 500 mg oral delayed release tablet: 1 tab(s) orally once a day (at bedtime)  levothyroxine 200 mcg (0.2 mg) oral tablet: 1 tab(s) orally once a day  lisinopril 40 mg oral tablet: 1 tab(s) orally once a day  pravastatin 40 mg oral tablet: 1 tab(s) orally once a day  PROzac 20 mg oral capsule: 3 cap(s) orally once a day  RisperDAL 1 mg oral tablet: 1 tab(s) orally 2 times a day  Toprol- mg oral tablet, extended release: 1 tab(s) orally once a day   amLODIPine 5 mg oral tablet: 1 tab(s) orally once a day  Hold for SBP less than 110  ARIPiprazole 2 mg oral tablet: 1 tab(s) orally once a day  aspirin 81 mg oral delayed release tablet: 1 tab(s) orally once a day  benztropine 2 mg oral tablet: 1 tab(s) orally once a day  divalproex sodium 250 mg oral delayed release tablet: 1 tab(s) orally once a day  divalproex sodium 500 mg oral delayed release tablet: 1 tab(s) orally once a day (at bedtime)  levothyroxine 175 mcg (0.175 mg) oral tablet: 1 tab(s) orally once a day  pravastatin 40 mg oral tablet: 1 tab(s) orally once a day  PROzac 20 mg oral capsule: 3 cap(s) orally once a day

## 2021-12-24 NOTE — PROGRESS NOTE ADULT - SUBJECTIVE AND OBJECTIVE BOX
Subjective/objective: Pt with no complaints.       MEDS  amLODIPine   Tablet 10 milliGRAM(s) Oral daily  aspirin  chewable 81 milliGRAM(s) Oral daily  atorvastatin 40 milliGRAM(s) Oral at bedtime  benztropine 2 milliGRAM(s) Oral daily  cefTRIAXone   IVPB 1000 milliGRAM(s) IV Intermittent every 24 hours (D4)  dextrose 5% + sodium chloride 0.45%. 1000 milliLiter(s) IV Continuous <Continuous>  diVALproex  milliGRAM(s) Oral daily  diVALproex  milliGRAM(s) Oral at bedtime  FLUoxetine 60 milliGRAM(s) Oral daily  heparin   Injectable 5000 Unit(s) SubCutaneous every 8 hours  levothyroxine 175 MICROGram(s) Oral daily  metoprolol succinate  milliGRAM(s) Oral daily  risperiDONE   Tablet 1 milliGRAM(s) Oral two times a day      PHYSICAL EXAM:    Vital Signs Last 24 Hrs  T(C): 37.1 (24 Dec 2021 05:16), Max: 37.2 (23 Dec 2021 21:22)  T(F): 98.8 (24 Dec 2021 05:16), Max: 99 (23 Dec 2021 21:22)  HR: 63 (24 Dec 2021 05:16) (61 - 70)  BP: 157/63 (24 Dec 2021 05:16) (145/75 - 187/71)  BP(mean): --  RR: 18 (24 Dec 2021 05:16) (18 - 19)  SpO2: 98% (24 Dec 2021 05:16) (96% - 100%)    GEN: WD WN female in NAD    CHEST/Respiratory: clear to auscultation    Cardiovascular: S1S2 reg with no murmurs, gallops, rubs    Gastrointestinal: soft, nontender with BS present    Genitourinary: Primafit in place     Extremities: no erythema or swelling; onychomycosis on toes     Neurological: alert; knew she is in the hospital, month, yr; did not know exact date    Skin: no rashes      LABS/DIAGNOSTIC TESTS                            9.3    12.51 )-----------( 317      ( 24 Dec 2021 07:19 )             30.4       WBC Count: 12.51 K/uL (12-24 @ 07:19)  WBC Count: 13.05 K/uL (12-23 @ 08:36)  WBC Count: 14.43 K/uL (12-22 @ 06:14)      12-24    146<H>  |  114<H>  |  15  ----------------------------<  85  4.1   |  25  |  0.91    Ca    8.6      24 Dec 2021 07:19    CULTURES    Culture - Urine (collected 12-21-21 @ 04:35)  Source: Clean Catch Clean Catch (Midstream)  Final Report (12-22-21 @ 08:06):    No growth    Culture - Blood (collected 12-21-21 @ 04:17)  Source: .Blood Blood  Gram Stain (12-22-21 @ 01:00):    Growth in aerobic bottle: Gram Negative Rods  Final Report (12-22-21 @ 20:49):    Growth in aerobic bottle: Most closely resembling    Psychrobacter species "Susceptibilities not performed"    **BCID performed. No targets detected.**    ***Blood Panel PCR results on this specimen are available    approximately 3 hours after the Gram stain result.***    Gram stain, PCR, and/or culture results may not always    correspond due to difference in methodologies.    ************************************************************    This PCR assay was performed by multiplex PCR. This    Assay tests for 66 bacterial and resistance gene targets.    Please refer to the [facility] test directory    at [email or web address] for details.    Culture - Blood (collected 12-21-21 @ 04:17)  Source: .Blood Blood  Gram Stain (12-24-21 @ 04:21):    Growth in aerobic bottle: Gram positive cocci in pairs  Preliminary Report (12-24-21 @ 04:22):    Growth in aerobic bottle: Gram positive cocci in pairs    ***Blood Panel PCR results on this specimen are available    approximately 3 hours after the Gram stain result.***    Gram stain, PCR, and/or culture results may not always    correspond due to difference in methodologies.    ************************************************************    This PCR assay was performed by multiplex PCR. This    Assay tests for 66 bacterial and resistance gene targets.    Please refer to the [facility] test directory    at [email or web address] for details.  Organism: Blood Culture PCR (12-24-21 @ 04:25)  Organism: Blood Culture PCR (12-24-21 @ 04:25)      -  Coagulase negative Staphylococcus: Detec      Method Type: PCR                 Subjective/objective: Pt with no complaints.       MEDS  amLODIPine   Tablet 10 milliGRAM(s) Oral daily  aspirin  chewable 81 milliGRAM(s) Oral daily  atorvastatin 40 milliGRAM(s) Oral at bedtime  benztropine 2 milliGRAM(s) Oral daily  cefTRIAXone   IVPB 1000 milliGRAM(s) IV Intermittent every 24 hours (D4)  dextrose 5% + sodium chloride 0.45%. 1000 milliLiter(s) IV Continuous <Continuous>  diVALproex  milliGRAM(s) Oral daily  diVALproex  milliGRAM(s) Oral at bedtime  FLUoxetine 60 milliGRAM(s) Oral daily  heparin   Injectable 5000 Unit(s) SubCutaneous every 8 hours  levothyroxine 175 MICROGram(s) Oral daily  metoprolol succinate  milliGRAM(s) Oral daily  risperiDONE   Tablet 1 milliGRAM(s) Oral two times a day      PHYSICAL EXAM:    Vital Signs Last 24 Hrs  T(C): 37.1 (24 Dec 2021 05:16), Max: 37.2 (23 Dec 2021 21:22)  T(F): 98.8 (24 Dec 2021 05:16), Max: 99 (23 Dec 2021 21:22)  HR: 63 (24 Dec 2021 05:16) (61 - 70)  BP: 157/63 (24 Dec 2021 05:16) (145/75 - 187/71)  BP(mean): --  RR: 18 (24 Dec 2021 05:16) (18 - 19)  SpO2: 98% (24 Dec 2021 05:16) (96% - 100%)    GEN: WD WN female in NAD    CHEST/Respiratory: clear to auscultation    Cardiovascular: S1S2 reg with no murmurs, gallops, rubs    Gastrointestinal: soft, nontender with BS present    Genitourinary: Primafit in place     Extremities: no erythema or swelling; onychomycosis on toes     Neurological: alert; knew she is in the hospital, month, yr; did not know exact date    Skin: no rashes      LABS/DIAGNOSTIC TESTS                        9.3    12.51 )-----------( 317      ( 24 Dec 2021 07:19 )             30.4       WBC Count: 12.51 K/uL (12-24 @ 07:19)  WBC Count: 13.05 K/uL (12-23 @ 08:36)  WBC Count: 14.43 K/uL (12-22 @ 06:14)      12-24    146<H>  |  114<H>  |  15  ----------------------------<  85  4.1   |  25  |  0.91    Ca    8.6      24 Dec 2021 07:19    CULTURES    Culture - Urine (collected 12-21-21 @ 04:35)  Source: Clean Catch Clean Catch (Midstream)  Final Report (12-22-21 @ 08:06):    No growth    Culture - Blood (collected 12-21-21 @ 04:17)  Source: .Blood Blood  Gram Stain (12-22-21 @ 01:00):    Growth in aerobic bottle: Gram Negative Rods  Final Report (12-22-21 @ 20:49):    Growth in aerobic bottle: Most closely resembling    Psychrobacter species "Susceptibilities not performed"    **BCID performed. No targets detected.**    ***Blood Panel PCR results on this specimen are available    approximately 3 hours after the Gram stain result.***    Gram stain, PCR, and/or culture results may not always    correspond due to difference in methodologies.    ************************************************************    This PCR assay was performed by multiplex PCR. This    Assay tests for 66 bacterial and resistance gene targets.    Please refer to the [facility] test directory    at [email or web address] for details.    Culture - Blood (collected 12-21-21 @ 04:17)  Source: .Blood Blood  Gram Stain (12-24-21 @ 04:21):    Growth in aerobic bottle: Gram positive cocci in pairs  Preliminary Report (12-24-21 @ 04:22):    Growth in aerobic bottle: Gram positive cocci in pairs    ***Blood Panel PCR results on this specimen are available    approximately 3 hours after the Gram stain result.***    Gram stain, PCR, and/or culture results may not always    correspond due to difference in methodologies.    ************************************************************    This PCR assay was performed by multiplex PCR. This    Assay tests for 66 bacterial and resistance gene targets.    Please refer to the [facility] test directory    at [email or web address] for details.  Organism: Blood Culture PCR (12-24-21 @ 04:25)  Organism: Blood Culture PCR (12-24-21 @ 04:25)      -  Coagulase negative Staphylococcus: Detec      Method Type: PCR                 Subjective/objective: Pt with no complaints.       MEDS  amLODIPine   Tablet 10 milliGRAM(s) Oral daily  aspirin  chewable 81 milliGRAM(s) Oral daily  atorvastatin 40 milliGRAM(s) Oral at bedtime  benztropine 2 milliGRAM(s) Oral daily  cefTRIAXone   IVPB 1000 milliGRAM(s) IV Intermittent every 24 hours (D4)  dextrose 5% + sodium chloride 0.45%. 1000 milliLiter(s) IV Continuous <Continuous>  diVALproex  milliGRAM(s) Oral daily  diVALproex  milliGRAM(s) Oral at bedtime  FLUoxetine 60 milliGRAM(s) Oral daily  heparin   Injectable 5000 Unit(s) SubCutaneous every 8 hours  levothyroxine 175 MICROGram(s) Oral daily  metoprolol succinate  milliGRAM(s) Oral daily  risperiDONE   Tablet 1 milliGRAM(s) Oral two times a day      PHYSICAL EXAM:    Vital Signs Last 24 Hrs  T(C): 37.1 (24 Dec 2021 05:16), Max: 37.2 (23 Dec 2021 21:22)  T(F): 98.8 (24 Dec 2021 05:16), Max: 99 (23 Dec 2021 21:22)  HR: 63 (24 Dec 2021 05:16) (61 - 70)  BP: 157/63 (24 Dec 2021 05:16) (145/75 - 187/71)  BP(mean): --  RR: 18 (24 Dec 2021 05:16) (18 - 19)  SpO2: 98% (24 Dec 2021 05:16) (96% - 100%)    GEN: WD WN female in NAD    CHEST/Respiratory: clear to auscultation    Cardiovascular: S1S2 reg with no murmurs, gallops, rubs    Gastrointestinal: soft, nontender with BS present    Genitourinary: Primafit in place     Extremities: no erythema or swelling; onychomycosis on toes     Neurological: alert and responsive    Skin: no rashes      LABS/DIAGNOSTIC TESTS                        9.3    12.51 )-----------( 317      ( 24 Dec 2021 07:19 )             30.4       WBC Count: 12.51 K/uL (12-24 @ 07:19)  WBC Count: 13.05 K/uL (12-23 @ 08:36)  WBC Count: 14.43 K/uL (12-22 @ 06:14)      12-24    146<H>  |  114<H>  |  15  ----------------------------<  85  4.1   |  25  |  0.91    Ca    8.6      24 Dec 2021 07:19    CULTURES    Culture - Urine (collected 12-21-21 @ 04:35)  Source: Clean Catch Clean Catch (Midstream)  Final Report (12-22-21 @ 08:06):    No growth    Culture - Blood (collected 12-21-21 @ 04:17)  Source: .Blood Blood  Gram Stain (12-22-21 @ 01:00):    Growth in aerobic bottle: Gram Negative Rods  Final Report (12-22-21 @ 20:49):    Growth in aerobic bottle: Most closely resembling    Psychrobacter species "Susceptibilities not performed"    **BCID performed. No targets detected.**    ***Blood Panel PCR results on this specimen are available    approximately 3 hours after the Gram stain result.***    Gram stain, PCR, and/or culture results may not always    correspond due to difference in methodologies.    ************************************************************    This PCR assay was performed by multiplex PCR. This    Assay tests for 66 bacterial and resistance gene targets.    Please refer to the [facility] test directory    at [email or web address] for details.    Culture - Blood (collected 12-21-21 @ 04:17)  Source: .Blood Blood  Gram Stain (12-24-21 @ 04:21):    Growth in aerobic bottle: Gram positive cocci in pairs  Preliminary Report (12-24-21 @ 04:22):    Growth in aerobic bottle: Gram positive cocci in pairs    ***Blood Panel PCR results on this specimen are available    approximately 3 hours after the Gram stain result.***    Gram stain, PCR, and/or culture results may not always    correspond due to difference in methodologies.    ************************************************************    This PCR assay was performed by multiplex PCR. This    Assay tests for 66 bacterial and resistance gene targets.    Please refer to the [facility] test directory    at [email or web address] for details.  Organism: Blood Culture PCR (12-24-21 @ 04:25)  Organism: Blood Culture PCR (12-24-21 @ 04:25)      -  Coagulase negative Staphylococcus: Detec      Method Type: PCR

## 2021-12-24 NOTE — DISCHARGE NOTE PROVIDER - PROVIDER TOKENS
FREE:[LAST:[PCP],FIRST:[PCP],PHONE:[(   )    -],FAX:[(   )    -],FOLLOWUP:[1 week]],FREE:[LAST:[RIAN Velazco],FIRST:[Kimi GUERRERO],PHONE:[(   )    -],FAX:[(   )    -],ADDRESS:[[facility] [address]],FOLLOWUP:[2 weeks],ESTABLISHEDPATIENT:[T]],FREE:[LAST:[Carter MODI],FIRST:[Kimi GUERRERO],PHONE:[phone]],FAX:[(   )    -],ADDRESS:[[address] [place] [address]],FOLLOWUP:[1 week]]

## 2021-12-24 NOTE — PROGRESS NOTE ADULT - SUBJECTIVE AND OBJECTIVE BOX
PGY-1 Progress Note discussed with attending    PAGER #: [[phone]] TILL 5:00 PM  PLEASE CONTACT ON CALL TEAM:  - On Call Team (Please refer to Michael) FROM 5:00 PM - 8:30PM  - Nightfloat Team FROM 8:30 -7:30 AM    HPI  Pt is a 62 y/o F with pmh of Bipolar disorder, HTN, HLD, Hypothyroidism, who presented to the ED BIBEMS due to AMS. 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 At the time of exam patient was AO x 0, arousable but lethargic, s/p Haldol and ativan for episode of agitation. Most information obtained from ED note and , Michelle [phone]. Pt is AO x 2 at baseline, for the past 2 weeks there have been changes in her activity and mental status, becoming more confused, not walking or doing much, having gait instability. Today patient was found to be sitting in her own urine, and when told so by the , she hadn't realized it. As per Michelle, no Fevers or chills reported, no sick contacts, no complains that patient expressed, other than the noticeable change in mental status. Patient was admitted at [facility] form 4/19/21 to 5/28/21 due to change in behavior, not able to care for herself and not taking her medications. Since then patient has required more assistance and they have people give her the medications twice a day. She also hasn't been eating well for the past 2 weeks.     In ED CT scan negative for acute disease, UA + for infection. WBC of 13, vira. S/p Rocephin, and haldold/ativan for episode of agitation.         INTERNAL EVENTS:   Patient examined at bedside this morning. Mental status is AAOx2-3 at baseline. She has no new complaints. Both blood cultures indicate bacteremia.    REVIEW OF SYSTEMS:  CONSTITUTIONAL: No fever, weight loss, or fatigue  RESPIRATORY: No cough, wheezing, chills or hemoptysis; No shortness of breath  CARDIOVASCULAR: No chest pain, palpitations, dizziness, or leg swelling  GASTROINTESTINAL: No abdominal pain. No nausea, vomiting, or hematemesis; No diarrhea or constipation. No melena or hematochezia.  GENITOURINARY: No dysuria or hematuria, urinary frequency  NEUROLOGICAL: No headaches, memory loss, loss of strength, numbness, or tremors  SKIN: No itching, burning, rashes, or lesions     MEDICATIONS  (STANDING):  amLODIPine   Tablet 10 milliGRAM(s) Oral daily  aspirin  chewable 81 milliGRAM(s) Oral daily  atorvastatin 40 milliGRAM(s) Oral at bedtime  benztropine 2 milliGRAM(s) Oral daily  cefTRIAXone   IVPB 1000 milliGRAM(s) IV Intermittent every 24 hours  dextrose 5% + sodium chloride 0.45%. 1000 milliLiter(s) (75 mL/Hr) IV Continuous <Continuous>  diVALproex  milliGRAM(s) Oral daily  diVALproex  milliGRAM(s) Oral at bedtime  FLUoxetine 60 milliGRAM(s) Oral daily  heparin   Injectable 5000 Unit(s) SubCutaneous every 8 hours  levothyroxine 175 MICROGram(s) Oral daily  lisinopril 20 milliGRAM(s) Oral daily  metoprolol succinate  milliGRAM(s) Oral daily  risperiDONE   Tablet 1 milliGRAM(s) Oral two times a day    MEDICATIONS  (PRN):      Vital Signs Last 24 Hrs  T(C): 36.9 (23 Dec 2021 05:16), Max: 36.9 (22 Dec 2021 13:58)  T(F): 98.5 (23 Dec 2021 05:16), Max: 98.5 (22 Dec 2021 13:58)  HR: 87 (23 Dec 2021 05:16) (74 - 87)  BP: 146/85 (23 Dec 2021 05:16) (105/61 - 159/95)  BP(mean): --  RR: 18 (23 Dec 2021 05:16) (18 - 19)  SpO2: 98% (23 Dec 2021 05:16) (96% - 100%)    PHYSICAL EXAMINATION:  GENERAL: NAD, AAOx3  HEAD: AT/NC  EYES: conjunctiva and sclera clear  NECK: supple, No JVD noted, Normal thyroid  CHEST/LUNG: CTABL; no rales, rhonchi, wheezing, or rubs  HEART: regular rate and rhythm; no murmurs, rubs, or gallops  ABDOMEN: soft, nontender, nondistended; Bowel sounds present  EXTREMITIES:  2+ Peripheral Pulses, No clubbing, cyanosis, or edema  SKIN: warm dry                          9.4    13.05 )-----------( 341      ( 23 Dec 2021 08:36 )             30.0     12-23    147<H>  |  116<H>  |  24<H>  ----------------------------<  85  4.0   |  25  |  1.05    Ca    8.7      23 Dec 2021 08:36              COVID-19 PCR: Jong (20 Dec 2021 21:49)      CAPILLARY BLOOD GLUCOSE          RADIOLOGY & ADDITIONAL TESTS:

## 2021-12-24 NOTE — PROGRESS NOTE ADULT - ASSESSMENT
Pt is a 61 year old female with PMHx of Bipolar disorder, HTN, HLD, Hypothyroidism from "Level 2 conjugate program" admitted 12/20 for acute encephalopathy thought due to UTI. ID asked to evaluate patient for bacteremia. UA with WBC >11-25; UCx no growth; one set of blood cultures growing GNR identified as Psychrobacter species and other blood culture set growing Coagulase negative Staphylococcus. Pt started on ceftriaxone on admission. WBC downtrending. Psychrobacter organism is usually an environmental organism. Limited data exist regarding its pathogenicity. I contacted the  [facility] ([phone]) to obtain susceptibilities. The organism will be sent to an outside reference lab for susceptibility testing. In view of patient's clinical improvement on CTX, would continue to Rx to complete 7d.    #1 Acute encephalopathy - resolved now    #2 Bacteremia with Psychrobacter species--see above        -- continue ceftriaxone  -- f/u repeat BC to check for clearing Pt is a 61 year old female with PMHx of Bipolar disorder, HTN, HLD, Hypothyroidism from "Level 2 conjugate program" admitted 12/20 for acute encephalopathy thought due to UTI. ID asked to evaluate patient for bacteremia. UA with WBC >11-25; UCx no growth; one set of blood cultures growing GNR identified as Psychrobacter species with other blood culture set from 12/21 growing Coagulase negative Staphylococcus. Pt started on ceftriaxone on admission. Pt clinically improving. WBC downtrending. Psychrobacter organism is usually an environmental organism. Limited data exist regarding its pathogenicity. I contacted the  [facility] ([phone]) to obtain susceptibilities. The organism will be sent to an outside reference lab for susceptibility testing, which will likely take several weeks for a result. In view of patient's clinical improvement on CTX, would continue to Rx to complete 7d. Coag neg Staph is likely a contaminant not requiring Rx.     #1 Acute encephalopathy - resolved now    #2 Bacteremia with Psychrobacter species--see above        -- continue ceftriaxone to complete 7d  -- f/u repeat BC to check for clearing    Discussed above with Dr. Raya. Please call again if needed.  Pt is a 61 year old female with PMHx of Bipolar disorder, HTN, HLD, Hypothyroidism from "Level 2 conjugate program" admitted 12/20 for acute encephalopathy thought due to UTI. ID asked to evaluate patient for bacteremia. UA with WBC >11-25; UCx no growth; one set of blood cultures growing GNR identified as Psychrobacter species with other blood culture set from 12/21 growing Coagulase negative Staphylococcus. Pt started on ceftriaxone on admission. WBC downtrending. Psychrobacter organism is usually an environmental organism. Limited data exist regarding its pathogenicity. I contacted the  [facility] ([phone]) to obtain susceptibilities. The organism will be sent to an outside reference lab for susceptibility testing, which will likely take several weeks for a result. In view of patient's decrease in WBC and clinical improvement on CTX, would continue to Rx to complete 7d. Coag neg Staph is likely a contaminant not requiring Rx.     #1 Acute encephalopathy - resolved now    #2 Bacteremia with Psychrobacter species--see above        -- continue ceftriaxone to complete 7d  -- f/u repeat BC to check for clearing    Discussed above with Dr. Raya. Please call again if needed.

## 2021-12-24 NOTE — PROGRESS NOTE ADULT - ATTENDING COMMENTS
BRUNA overnight, patient is now alert and oriented x 2, back to her baseline.   Pleasant and conversant. Denied any other complaints    #Bacteremia, psychrobacter species  #UTI  #Altered mental status  #Toxic metabolic encephalopathy  #AYAN likely pre-renal improved with IVF  #HTN  #Progressive dementia  #Physical deconditioning  #Hypothyroidism on levothyroxine, Ft14 and TSH elevated.   -- repeat blood cx with coag negative staff  -- repeat blood cx sent today  -- continue ceftriaxone for 7 day course per ID, can switch to ceftin after  -- susceptibility testing per ID  -- PT recommending CRISTOBAL  -- decreased levothyroxine to 175mcg from 200mcg, will need repeat TSH check in 6 weeks

## 2021-12-25 LAB
ANION GAP SERPL CALC-SCNC: 7 MMOL/L — SIGNIFICANT CHANGE UP (ref 5–17)
BUN SERPL-MCNC: 15 MG/DL — SIGNIFICANT CHANGE UP (ref 7–18)
CALCIUM SERPL-MCNC: 8.9 MG/DL — SIGNIFICANT CHANGE UP (ref 8.4–10.5)
CHLORIDE SERPL-SCNC: 112 MMOL/L — HIGH (ref 96–108)
CO2 SERPL-SCNC: 27 MMOL/L — SIGNIFICANT CHANGE UP (ref 22–31)
CREAT SERPL-MCNC: 0.98 MG/DL — SIGNIFICANT CHANGE UP (ref 0.5–1.3)
CULTURE RESULTS: SIGNIFICANT CHANGE UP
GLUCOSE SERPL-MCNC: 75 MG/DL — SIGNIFICANT CHANGE UP (ref 70–99)
GRAM STN FLD: SIGNIFICANT CHANGE UP
HCT VFR BLD CALC: 31.8 % — LOW (ref 34.5–45)
HGB BLD-MCNC: 9.8 G/DL — LOW (ref 11.5–15.5)
MCHC RBC-ENTMCNC: 29.8 PG — SIGNIFICANT CHANGE UP (ref 27–34)
MCHC RBC-ENTMCNC: 30.8 GM/DL — LOW (ref 32–36)
MCV RBC AUTO: 96.7 FL — SIGNIFICANT CHANGE UP (ref 80–100)
NRBC # BLD: 0 /100 WBCS — SIGNIFICANT CHANGE UP (ref 0–0)
PLATELET # BLD AUTO: 333 K/UL — SIGNIFICANT CHANGE UP (ref 150–400)
POTASSIUM SERPL-MCNC: 4.2 MMOL/L — SIGNIFICANT CHANGE UP (ref 3.5–5.3)
POTASSIUM SERPL-SCNC: 4.2 MMOL/L — SIGNIFICANT CHANGE UP (ref 3.5–5.3)
RBC # BLD: 3.29 M/UL — LOW (ref 3.8–5.2)
RBC # FLD: 12.2 % — SIGNIFICANT CHANGE UP (ref 10.3–14.5)
SODIUM SERPL-SCNC: 146 MMOL/L — HIGH (ref 135–145)
WBC # BLD: 11.63 K/UL — HIGH (ref 3.8–10.5)
WBC # FLD AUTO: 11.63 K/UL — HIGH (ref 3.8–10.5)

## 2021-12-25 PROCEDURE — 99233 SBSQ HOSP IP/OBS HIGH 50: CPT | Mod: GC

## 2021-12-25 RX ADMIN — Medication 2 MILLIGRAM(S): at 12:54

## 2021-12-25 RX ADMIN — RISPERIDONE 1 MILLIGRAM(S): 4 TABLET ORAL at 18:19

## 2021-12-25 RX ADMIN — HEPARIN SODIUM 5000 UNIT(S): 5000 INJECTION INTRAVENOUS; SUBCUTANEOUS at 13:29

## 2021-12-25 RX ADMIN — ATORVASTATIN CALCIUM 40 MILLIGRAM(S): 80 TABLET, FILM COATED ORAL at 21:35

## 2021-12-25 RX ADMIN — HEPARIN SODIUM 5000 UNIT(S): 5000 INJECTION INTRAVENOUS; SUBCUTANEOUS at 05:36

## 2021-12-25 RX ADMIN — Medication 100 MILLIGRAM(S): at 05:36

## 2021-12-25 RX ADMIN — DIVALPROEX SODIUM 250 MILLIGRAM(S): 500 TABLET, DELAYED RELEASE ORAL at 12:53

## 2021-12-25 RX ADMIN — LISINOPRIL 40 MILLIGRAM(S): 2.5 TABLET ORAL at 05:36

## 2021-12-25 RX ADMIN — Medication 81 MILLIGRAM(S): at 12:53

## 2021-12-25 RX ADMIN — AMLODIPINE BESYLATE 10 MILLIGRAM(S): 2.5 TABLET ORAL at 05:36

## 2021-12-25 RX ADMIN — CEFTRIAXONE 100 MILLIGRAM(S): 500 INJECTION, POWDER, FOR SOLUTION INTRAMUSCULAR; INTRAVENOUS at 12:56

## 2021-12-25 RX ADMIN — HEPARIN SODIUM 5000 UNIT(S): 5000 INJECTION INTRAVENOUS; SUBCUTANEOUS at 21:35

## 2021-12-25 RX ADMIN — Medication 175 MICROGRAM(S): at 05:37

## 2021-12-25 RX ADMIN — DIVALPROEX SODIUM 500 MILLIGRAM(S): 500 TABLET, DELAYED RELEASE ORAL at 21:35

## 2021-12-25 RX ADMIN — RISPERIDONE 1 MILLIGRAM(S): 4 TABLET ORAL at 05:36

## 2021-12-25 RX ADMIN — Medication 60 MILLIGRAM(S): at 12:53

## 2021-12-25 NOTE — PROGRESS NOTE ADULT - ASSESSMENT
61 year old female with PMHx of Bipolar disorder, HTN, HLD, Hypothyroidism from "Level 2 conjugate program" admitted 12/20 for acute encephalopathy thought due to UTI. Found to have bacteremia with psychrobacter species, now clear.     #Bacteremia, psychrobacter species  #UTI  #Altered mental status 2/2 toxic metabolic encephalopathy in the setting of infx, resolved. Back to baseline  #AYAN likely pre-renal improved with IVF  #HTN on amlodipine, toprol XL 100mg and lisinopril 20mg  #Progressive dementia  #Physical deconditioning  #Hypothyroidism on levothyroxine, Ft14 and TSH elevated.   #Bipolar disorder   #HLD on lipitor  -- repeat blood cx with coag negative staff  -- repeat blood cx 12/23 NGTD  -- continue ceftriaxone for 7 day course per ID, can switch to ceftin after for total 14 day course  -- susceptibility testing per ID send out  -- PT recommending CRISTOBAL  -- decreased levothyroxine to 175mcg from 200mcg, will need repeat TSH check in 6 weeks   -- continuing home depakote 250mg QD 500mg QHS, prozac 60mg, risperdal 1mg BID

## 2021-12-25 NOTE — PROGRESS NOTE ADULT - SUBJECTIVE AND OBJECTIVE BOX
Patient is a 61y old  Female who presents with a chief complaint of AMS (24 Dec 2021 11:49)      SUBJECTIVE / OVERNIGHT EVENTS: BRUNA overnight, patient denies any complaints  ADDITIONAL REVIEW OF SYSTEMS: negative as per above    MEDICATIONS  (STANDING):  amLODIPine   Tablet 10 milliGRAM(s) Oral daily  aspirin  chewable 81 milliGRAM(s) Oral daily  atorvastatin 40 milliGRAM(s) Oral at bedtime  benztropine 2 milliGRAM(s) Oral daily  cefTRIAXone   IVPB 1000 milliGRAM(s) IV Intermittent every 24 hours  dextrose 5% + sodium chloride 0.45%. 1000 milliLiter(s) (75 mL/Hr) IV Continuous <Continuous>  diVALproex  milliGRAM(s) Oral daily  diVALproex  milliGRAM(s) Oral at bedtime  FLUoxetine 60 milliGRAM(s) Oral daily  heparin   Injectable 5000 Unit(s) SubCutaneous every 8 hours  levothyroxine 175 MICROGram(s) Oral daily  lisinopril 40 milliGRAM(s) Oral daily  metoprolol succinate  milliGRAM(s) Oral daily  risperiDONE   Tablet 1 milliGRAM(s) Oral two times a day    MEDICATIONS  (PRN):      CAPILLARY BLOOD GLUCOSE        I&O's Summary    24 Dec 2021 07:01  -  25 Dec 2021 07:00  --------------------------------------------------------  IN: 0 mL / OUT: 1000 mL / NET: -1000 mL        PHYSICAL EXAM:  Vital Signs Last 24 Hrs  T(C): 37 (24 Dec 2021 22:40), Max: 37 (24 Dec 2021 22:40)  T(F): 98.6 (24 Dec 2021 22:40), Max: 98.6 (24 Dec 2021 22:40)  HR: 69 (24 Dec 2021 22:40) (59 - 69)  BP: 146/76 (24 Dec 2021 22:40) (146/76 - 153/84)  BP(mean): --  RR: 18 (24 Dec 2021 22:40) (18 - 18)  SpO2: 98% (24 Dec 2021 22:40) (98% - 100%)    GENERAL: NAD, pleasant and interactive  HEAD:  Atraumatic   EYES: onjunctiva and sclera clear  CHEST/LUNG: Clear to auscultation bilaterally; No wheeze  HEART: Regular rate and rhythm   ABDOMEN: Soft, Nontender, , cyanosis, or edema  PSYCH: AAOx3  NEUROLOGY: non-focal       LABS:                        9.8    11.63 )-----------( 333      ( 25 Dec 2021 06:40 )             31.8     12-25    146<H>  |  112<H>  |  15  ----------------------------<  75  4.2   |  27  |  0.98    Ca    8.9      25 Dec 2021 06:40                Culture - Blood (collected 23 Dec 2021 18:24)  Source: .Blood Blood-Peripheral  Preliminary Report (24 Dec 2021 19:02):    No growth to date.    Culture - Blood (collected 23 Dec 2021 18:24)  Source: .Blood Blood-Peripheral  Preliminary Report (24 Dec 2021 19:02):    No growth to date.      Trend Procalcitonin        Ferritin, Serum: 365 ng/mL (12-21-21 @ 09:28)    D-Dimer:      RADIOLOGY & ADDITIONAL TESTS:  Results Reviewed:   Imaging Personally Reviewed:  Electrocardiogram Personally Reviewed:    COORDINATION OF CARE:  Care Discussed with Consultants/Other Providers [Y/N]:  Prior or Outpatient Records Reviewed [Y/N]:

## 2021-12-26 LAB
ANION GAP SERPL CALC-SCNC: 4 MMOL/L — LOW (ref 5–17)
BUN SERPL-MCNC: 20 MG/DL — HIGH (ref 7–18)
CALCIUM SERPL-MCNC: 9.3 MG/DL — SIGNIFICANT CHANGE UP (ref 8.4–10.5)
CHLORIDE SERPL-SCNC: 113 MMOL/L — HIGH (ref 96–108)
CO2 SERPL-SCNC: 28 MMOL/L — SIGNIFICANT CHANGE UP (ref 22–31)
CREAT SERPL-MCNC: 1.01 MG/DL — SIGNIFICANT CHANGE UP (ref 0.5–1.3)
CULTURE RESULTS: SIGNIFICANT CHANGE UP
GLUCOSE SERPL-MCNC: 97 MG/DL — SIGNIFICANT CHANGE UP (ref 70–99)
HCT VFR BLD CALC: 32 % — LOW (ref 34.5–45)
HGB BLD-MCNC: 9.9 G/DL — LOW (ref 11.5–15.5)
MCHC RBC-ENTMCNC: 29.7 PG — SIGNIFICANT CHANGE UP (ref 27–34)
MCHC RBC-ENTMCNC: 30.9 GM/DL — LOW (ref 32–36)
MCV RBC AUTO: 96.1 FL — SIGNIFICANT CHANGE UP (ref 80–100)
NRBC # BLD: 0 /100 WBCS — SIGNIFICANT CHANGE UP (ref 0–0)
ORGANISM # SPEC MICROSCOPIC CNT: SIGNIFICANT CHANGE UP
ORGANISM # SPEC MICROSCOPIC CNT: SIGNIFICANT CHANGE UP
PLATELET # BLD AUTO: 334 K/UL — SIGNIFICANT CHANGE UP (ref 150–400)
POTASSIUM SERPL-MCNC: 4.3 MMOL/L — SIGNIFICANT CHANGE UP (ref 3.5–5.3)
POTASSIUM SERPL-SCNC: 4.3 MMOL/L — SIGNIFICANT CHANGE UP (ref 3.5–5.3)
RBC # BLD: 3.33 M/UL — LOW (ref 3.8–5.2)
RBC # FLD: 12.2 % — SIGNIFICANT CHANGE UP (ref 10.3–14.5)
SODIUM SERPL-SCNC: 145 MMOL/L — SIGNIFICANT CHANGE UP (ref 135–145)
SPECIMEN SOURCE: SIGNIFICANT CHANGE UP
WBC # BLD: 13.73 K/UL — HIGH (ref 3.8–10.5)
WBC # FLD AUTO: 13.73 K/UL — HIGH (ref 3.8–10.5)

## 2021-12-26 PROCEDURE — 99233 SBSQ HOSP IP/OBS HIGH 50: CPT | Mod: GC

## 2021-12-26 RX ADMIN — Medication 100 MILLIGRAM(S): at 05:13

## 2021-12-26 RX ADMIN — RISPERIDONE 1 MILLIGRAM(S): 4 TABLET ORAL at 17:40

## 2021-12-26 RX ADMIN — DIVALPROEX SODIUM 250 MILLIGRAM(S): 500 TABLET, DELAYED RELEASE ORAL at 12:07

## 2021-12-26 RX ADMIN — HEPARIN SODIUM 5000 UNIT(S): 5000 INJECTION INTRAVENOUS; SUBCUTANEOUS at 13:20

## 2021-12-26 RX ADMIN — Medication 2 MILLIGRAM(S): at 12:07

## 2021-12-26 RX ADMIN — RISPERIDONE 1 MILLIGRAM(S): 4 TABLET ORAL at 05:13

## 2021-12-26 RX ADMIN — LISINOPRIL 40 MILLIGRAM(S): 2.5 TABLET ORAL at 05:13

## 2021-12-26 RX ADMIN — ATORVASTATIN CALCIUM 40 MILLIGRAM(S): 80 TABLET, FILM COATED ORAL at 22:03

## 2021-12-26 RX ADMIN — DIVALPROEX SODIUM 500 MILLIGRAM(S): 500 TABLET, DELAYED RELEASE ORAL at 22:03

## 2021-12-26 RX ADMIN — HEPARIN SODIUM 5000 UNIT(S): 5000 INJECTION INTRAVENOUS; SUBCUTANEOUS at 22:03

## 2021-12-26 RX ADMIN — CEFTRIAXONE 100 MILLIGRAM(S): 500 INJECTION, POWDER, FOR SOLUTION INTRAMUSCULAR; INTRAVENOUS at 12:07

## 2021-12-26 RX ADMIN — Medication 81 MILLIGRAM(S): at 12:08

## 2021-12-26 RX ADMIN — Medication 60 MILLIGRAM(S): at 12:07

## 2021-12-26 RX ADMIN — AMLODIPINE BESYLATE 10 MILLIGRAM(S): 2.5 TABLET ORAL at 05:13

## 2021-12-26 RX ADMIN — Medication 175 MICROGRAM(S): at 05:13

## 2021-12-26 RX ADMIN — HEPARIN SODIUM 5000 UNIT(S): 5000 INJECTION INTRAVENOUS; SUBCUTANEOUS at 05:14

## 2021-12-26 NOTE — PROGRESS NOTE ADULT - PROBLEM SELECTOR PLAN 3
brother reported that pt is dementia which is getting worse now x 2 weeks   supportive care  due to physical and mental limitation due to dementia progression pt may need a placement  PT recommends CRISTOBAL

## 2021-12-26 NOTE — PROGRESS NOTE ADULT - ATTENDING COMMENTS
61 year old female with PMHx of Bipolar disorder, HTN, HLD, Hypothyroidism from "Level 2 conjugate program" admitted 12/20 for acute encephalopathy thought due to UTI. Found to have bacteremia with psychrobacter species, now clear.     #Bacteremia, psychrobacter species  #UTI  #Altered mental status 2/2 toxic metabolic encephalopathy in the setting of infx, resolved. Back to baseline  #AYAN likely pre-renal improved with IVF  #HTN on amlodipine, toprol XL 100mg and lisinopril 20mg  #Progressive dementia  #Physical deconditioning  #Hypothyroidism on levothyroxine, Ft14 and TSH elevated.   #Bipolar disorder   #HLD on lipitor  -- repeat blood cx with coag negative staff  -- repeat blood cx 12/23 NGTD  -- continue ceftriaxone for 7 day course per ID, can switch to ceftin after for total 14 day course  -- susceptibility testing per ID send out  -- PT recommending CRISTOBAL  -- decreased levothyroxine to 175mcg from 200mcg, will need repeat TSH check in 6 weeks   -- continuing home depakote 250mg QD 500mg QHS, prozac 60mg, risperdal 1mg BID  -- tele psych consult

## 2021-12-26 NOTE — PROGRESS NOTE ADULT - SUBJECTIVE AND OBJECTIVE BOX
PGY-1 Progress Note discussed with attending    PAGER #: [[phone]] TILL 5:00 PM  PLEASE CONTACT ON CALL TEAM:  - On Call Team (Please refer to Michael) FROM 5:00 PM - 8:30PM  - Nightfloat Team FROM 8:30 -7:30 AM    HPI  Pt is a 60 y/o F with pmh of Bipolar disorder, HTN, HLD, Hypothyroidism, who presented to the ED BIBEMS due to AMS. 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 At the time of exam patient was AO x 0, arousable but lethargic, s/p Haldol and ativan for episode of agitation. Most information obtained from ED note and , Michelle [phone]. Pt is AO x 2 at baseline, for the past 2 weeks there have been changes in her activity and mental status, becoming more confused, not walking or doing much, having gait instability. Today patient was found to be sitting in her own urine, and when told so by the , she hadn't realized it. As per Michelle, no Fevers or chills reported, no sick contacts, no complains that patient expressed, other than the noticeable change in mental status. Patient was admitted at [facility] form 4/19/21 to 5/28/21 due to change in behavior, not able to care for herself and not taking her medications. Since then patient has required more assistance and they have people give her the medications twice a day. She also hasn't been eating well for the past 2 weeks.     In ED CT scan negative for acute disease, UA + for infection. WBC of 13, vira. S/p Rocephin, and haldold/ativan for episode of agitation.         INTERNAL EVENTS:   Patient examined at bedside this morning. Patient has no complaints at this time, 2nd set of blood cultures with no growth to date.    REVIEW OF SYSTEMS:  CONSTITUTIONAL: No fever, weight loss, or fatigue  RESPIRATORY: No cough, wheezing, chills or hemoptysis; No shortness of breath  CARDIOVASCULAR: No chest pain, palpitations, dizziness, or leg swelling  GASTROINTESTINAL: No abdominal pain. No nausea, vomiting, or hematemesis; No diarrhea or constipation. No melena or hematochezia.  GENITOURINARY: No dysuria or hematuria, urinary frequency  NEUROLOGICAL: No headaches, memory loss, loss of strength, numbness, or tremors  SKIN: No itching, burning, rashes, or lesions     MEDICATIONS  (STANDING):  amLODIPine   Tablet 10 milliGRAM(s) Oral daily  aspirin  chewable 81 milliGRAM(s) Oral daily  atorvastatin 40 milliGRAM(s) Oral at bedtime  benztropine 2 milliGRAM(s) Oral daily  cefTRIAXone   IVPB 1000 milliGRAM(s) IV Intermittent every 24 hours  dextrose 5% + sodium chloride 0.45%. 1000 milliLiter(s) (75 mL/Hr) IV Continuous <Continuous>  diVALproex  milliGRAM(s) Oral daily  diVALproex  milliGRAM(s) Oral at bedtime  FLUoxetine 60 milliGRAM(s) Oral daily  heparin   Injectable 5000 Unit(s) SubCutaneous every 8 hours  levothyroxine 175 MICROGram(s) Oral daily  lisinopril 20 milliGRAM(s) Oral daily  metoprolol succinate  milliGRAM(s) Oral daily  risperiDONE   Tablet 1 milliGRAM(s) Oral two times a day    MEDICATIONS  (PRN):      Vital Signs Last 24 Hrs  T(C): 36.9 (23 Dec 2021 05:16), Max: 36.9 (22 Dec 2021 13:58)  T(F): 98.5 (23 Dec 2021 05:16), Max: 98.5 (22 Dec 2021 13:58)  HR: 87 (23 Dec 2021 05:16) (74 - 87)  BP: 146/85 (23 Dec 2021 05:16) (105/61 - 159/95)  BP(mean): --  RR: 18 (23 Dec 2021 05:16) (18 - 19)  SpO2: 98% (23 Dec 2021 05:16) (96% - 100%)    PHYSICAL EXAMINATION:  GENERAL: NAD, AAOx3  HEAD: AT/NC  EYES: conjunctiva and sclera clear  NECK: supple, No JVD noted, Normal thyroid  CHEST/LUNG: CTABL; no rales, rhonchi, wheezing, or rubs  HEART: regular rate and rhythm; no murmurs, rubs, or gallops  ABDOMEN: soft, nontender, nondistended; Bowel sounds present  EXTREMITIES:  2+ Peripheral Pulses, No clubbing, cyanosis, or edema  SKIN: warm dry                          9.4    13.05 )-----------( 341      ( 23 Dec 2021 08:36 )             30.0     12-23    147<H>  |  116<H>  |  24<H>  ----------------------------<  85  4.0   |  25  |  1.05    Ca    8.7      23 Dec 2021 08:36              COVID-19 PCR: Jong (20 Dec 2021 21:49)      CAPILLARY BLOOD GLUCOSE          RADIOLOGY & ADDITIONAL TESTS:

## 2021-12-26 NOTE — PROGRESS NOTE ADULT - PROBLEM SELECTOR PLAN 2
Pt with + UA, wbc of 13  Urine cultures; NGTD, 1st BCx grew GNRs Psychrobacter species  2nd BCx Coagulase negative Staph  continue ceftriaxone for a total of 7 days.   Ceftin for a total of 14 days  c/w iv fluids  monitor mental status

## 2021-12-26 NOTE — PROGRESS NOTE ADULT - ASSESSMENT
Pt is a 60 y/o F with pmh of Bipolar disorder, HTN, HLD, Hypothyroidism, who presented to the ED BIBEMS due to AMS. 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 At the time of exam patient was AO x 0, arousable but lethargic, s/p Haldol and ativan for episode of agitation. Most information obtained from ED note and , Michelle [phone]. Pt is AO x 2 at baseline, for the past 2 weeks there have been changes in her activity and mental status, becoming more confused, not walking or doing much, having gait instability. Today patient was found to be sitting in her own urine, and when told so by the , she hadn't realized it. As per Michelle, no Fevers or chills reported, no sick contacts, no complains that patient expressed, other than the noticeable change in mental status. Patient was admitted at [facility] form 4/19/21 to 5/28/21 due to change in behavior, not able to care for herself and not taking her medications. Since then patient has required more assistance and they have people give her the medications twice a day. She also hasn't been eating well for the past 2 weeks.     In ED CT scan negative for acute disease, UA + for infection. WBC of 13, vira. S/p Rocephin, and haldold/ativan for episode of agitation. spoke with brother Jose Canela via phone at [phone], pt has been declined mentally due to dementia and also possible neuropathy per him , asking to have w/u for neuropathy. He also mentioned that she may need placement due to her ADLs and cognition

## 2021-12-26 NOTE — PROGRESS NOTE ADULT - PROBLEM SELECTOR PLAN 1
pt with ams, progressively worsening for 1 week  has hx of bipolar disorder and hospitalizations due changes in behavior (last one in [place])  CTH negative for acute pathology, no focal neurological findings other than ams, but exam difficult to assess in current status.   likely due to infection (+UA, incontinence) and also worsening of dementia   Continue ceftriaxone for now   UCx: NGTD  1st BCx grew GNRs Psychrobacter species  2nd BCx Coagulase negative Staph  consult ID for ABx guidance given Psychrobacter species (no sensitivities)  Repeat blood cultures NGTD  ID consulted - Dr. Heart

## 2021-12-27 LAB
CULTURE RESULTS: SIGNIFICANT CHANGE UP
SARS-COV-2 RNA SPEC QL NAA+PROBE: SIGNIFICANT CHANGE UP

## 2021-12-27 PROCEDURE — 99233 SBSQ HOSP IP/OBS HIGH 50: CPT | Mod: GC

## 2021-12-27 PROCEDURE — 90792 PSYCH DIAG EVAL W/MED SRVCS: CPT | Mod: 95

## 2021-12-27 RX ADMIN — LISINOPRIL 40 MILLIGRAM(S): 2.5 TABLET ORAL at 05:37

## 2021-12-27 RX ADMIN — RISPERIDONE 1 MILLIGRAM(S): 4 TABLET ORAL at 05:37

## 2021-12-27 RX ADMIN — DIVALPROEX SODIUM 250 MILLIGRAM(S): 500 TABLET, DELAYED RELEASE ORAL at 13:22

## 2021-12-27 RX ADMIN — ATORVASTATIN CALCIUM 40 MILLIGRAM(S): 80 TABLET, FILM COATED ORAL at 22:30

## 2021-12-27 RX ADMIN — RISPERIDONE 1 MILLIGRAM(S): 4 TABLET ORAL at 17:27

## 2021-12-27 RX ADMIN — CEFTRIAXONE 100 MILLIGRAM(S): 500 INJECTION, POWDER, FOR SOLUTION INTRAMUSCULAR; INTRAVENOUS at 13:21

## 2021-12-27 RX ADMIN — Medication 60 MILLIGRAM(S): at 13:22

## 2021-12-27 RX ADMIN — Medication 175 MICROGRAM(S): at 05:37

## 2021-12-27 RX ADMIN — HEPARIN SODIUM 5000 UNIT(S): 5000 INJECTION INTRAVENOUS; SUBCUTANEOUS at 05:37

## 2021-12-27 RX ADMIN — HEPARIN SODIUM 5000 UNIT(S): 5000 INJECTION INTRAVENOUS; SUBCUTANEOUS at 22:13

## 2021-12-27 RX ADMIN — AMLODIPINE BESYLATE 10 MILLIGRAM(S): 2.5 TABLET ORAL at 05:37

## 2021-12-27 RX ADMIN — HEPARIN SODIUM 5000 UNIT(S): 5000 INJECTION INTRAVENOUS; SUBCUTANEOUS at 13:22

## 2021-12-27 RX ADMIN — Medication 2 MILLIGRAM(S): at 13:23

## 2021-12-27 RX ADMIN — Medication 81 MILLIGRAM(S): at 13:21

## 2021-12-27 RX ADMIN — Medication 100 MILLIGRAM(S): at 05:37

## 2021-12-27 RX ADMIN — DIVALPROEX SODIUM 500 MILLIGRAM(S): 500 TABLET, DELAYED RELEASE ORAL at 22:13

## 2021-12-27 NOTE — BH CONSULTATION LIAISON ASSESSMENT NOTE - CURRENT MEDICATION
MEDICATIONS  (STANDING):  amLODIPine   Tablet 10 milliGRAM(s) Oral daily  aspirin  chewable 81 milliGRAM(s) Oral daily  atorvastatin 40 milliGRAM(s) Oral at bedtime  benztropine 2 milliGRAM(s) Oral daily  cefTRIAXone   IVPB 1000 milliGRAM(s) IV Intermittent every 24 hours  dextrose 5% + sodium chloride 0.45%. 1000 milliLiter(s) (75 mL/Hr) IV Continuous <Continuous>  diVALproex  milliGRAM(s) Oral daily  diVALproex  milliGRAM(s) Oral at bedtime  FLUoxetine 60 milliGRAM(s) Oral daily  heparin   Injectable 5000 Unit(s) SubCutaneous every 8 hours  levothyroxine 175 MICROGram(s) Oral daily  lisinopril 40 milliGRAM(s) Oral daily  metoprolol succinate  milliGRAM(s) Oral daily  risperiDONE   Tablet 1 milliGRAM(s) Oral two times a day    MEDICATIONS  (PRN):

## 2021-12-27 NOTE — PROGRESS NOTE ADULT - SUBJECTIVE AND OBJECTIVE BOX
PGY-1 Progress Note discussed with attending    PAGER #: [[phone]] TILL 5:00 PM  PLEASE CONTACT ON CALL TEAM:  - On Call Team (Please refer to Michael) FROM 5:00 PM - 8:30PM  - Nightfloat Team FROM 8:30 -7:30 AM      INTERVAL HPI/OVERNIGHT EVENTS: no acute overnight events. Patient was seen and examined at bedside. states she feels better and denies any fever, chills, nausea, vomting, chest pain, or SOB, or dysuria.       REVIEW OF SYSTEMS:  CONSTITUTIONAL: No fever, weight loss, or fatigue  RESPIRATORY: No cough, wheezing, chills or hemoptysis; No shortness of breath  CARDIOVASCULAR: No chest pain, palpitations, dizziness, or leg swelling  GASTROINTESTINAL: No abdominal pain. No nausea, vomiting, or hematemesis; No diarrhea or constipation. No melena or hematochezia.  GENITOURINARY: No dysuria or hematuria, urinary frequency  NEUROLOGICAL: No headaches, memory loss, loss of strength, numbness, or tremors  SKIN: No itching, burning, rashes, or lesions     Vital Signs Last 24 Hrs  T(C): 36.9 (27 Dec 2021 05:07), Max: 36.9 (26 Dec 2021 13:18)  T(F): 98.4 (27 Dec 2021 05:07), Max: 98.5 (26 Dec 2021 13:18)  HR: 62 (27 Dec 2021 11:22) (56 - 64)  BP: 160/76 (27 Dec 2021 05:07) (112/49 - 160/76)  BP(mean): --  RR: 18 (27 Dec 2021 05:07) (18 - 18)  SpO2: 98% (27 Dec 2021 11:22) (98% - 100%)    PHYSICAL EXAMINATION:  GENERAL: NAD,   HEAD:  Atraumatic, Normocephalic  EYES:  conjunctiva and sclera clear  NECK: Supple, No JVD, Normal thyroid  CHEST/LUNG: Clear to auscultation. Clear to percussion bilaterally; No rales, rhonchi, wheezing, or rubs  HEART: Regular rate and rhythm; No murmurs, rubs, or gallops  ABDOMEN: Soft, Nontender, Nondistended; Bowel sounds present  NERVOUS SYSTEM:  Alert & Oriented X 2-3,    EXTREMITIES:  2+ Peripheral Pulses, No clubbing, cyanosis, or edema  SKIN: warm dry                          9.9    13.73 )-----------( 334      ( 26 Dec 2021 09:47 )             32.0     12-26    145  |  113<H>  |  20<H>  ----------------------------<  97  4.3   |  28  |  1.01    Ca    9.3      26 Dec 2021 09:47                CAPILLARY BLOOD GLUCOSE      RADIOLOGY & ADDITIONAL TESTS:

## 2021-12-27 NOTE — DIETITIAN INITIAL EVALUATION ADULT. - PROBLEM SELECTOR PLAN 2
Pt with + uA, wbc of 13  f/u urine cultures, blood cultures  continue rocephin  iv fluids  monitor mental status plan per MD

## 2021-12-27 NOTE — BH CONSULTATION LIAISON ASSESSMENT NOTE - NSCOMMENTSUICRISKFACT_PSY_ALL_CORE
none known, has been cooperative throughout hospitalization, psych is being called for clearance for placement due to psych hx

## 2021-12-27 NOTE — PROGRESS NOTE ADULT - ASSESSMENT
Pt is a 62 y/o F with pmh of Bipolar disorder, HTN, HLD, Hypothyroidism, who presented to the ED BIBEMS due to AMS. 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 At the time of exam patient was AO x 0, arousable but lethargic, s/p Haldol and ativan for episode of agitation. Most information obtained from ED note and , Michelle [phone]. Pt is AO x 2 at baseline, for the past 2 weeks there have been changes in her activity and mental status, becoming more confused, not walking or doing much, having gait instability. Today patient was found to be sitting in her own urine, and when told so by the , she hadn't realized it. As per Michelle, no Fevers or chills reported, no sick contacts, no complains that patient expressed, other than the noticeable change in mental status. Patient was admitted at [facility] form 4/19/21 to 5/28/21 due to change in behavior, not able to care for herself and not taking her medications. Since then patient has required more assistance and they have people give her the medications twice a day. She also hasn't been eating well for the past 2 weeks.     In ED CT scan negative for acute disease, UA + for infection. WBC of 13, vira. S/p Rocephin, and haldold/ativan for episode of agitation. spoke with brother oJse Canela via phone at [phone], pt has been declined mentally due to dementia and also possible neuropathy per him , asking to have w/u for neuropathy. He also mentioned that she may need placement due to her ADLs and cognition

## 2021-12-27 NOTE — PROGRESS NOTE ADULT - PROBLEM SELECTOR PLAN 5
hx of bipolar disorder on multiple meds  hx of non compliance, but now workers of the program she lives in are giving her the medications  continue divalproex 250mg QD 500mg QHS, prozac 60mg QD, risperdal 1mg BID home dose hx of bipolar disorder on multiple meds  hx of non compliance, but now workers of the program she lives in are giving her the medications  continue divalproex 250mg QD 500mg QHS, prozac 60mg QD, risperdal 1mg BID home dose  Tele Psych was consulted: clear patient for placement

## 2021-12-27 NOTE — BH CONSULTATION LIAISON ASSESSMENT NOTE - NSBHCHARTREVIEWVS_PSY_A_CORE FT
Vital Signs Last 24 Hrs  T(C): 36.9 (27 Dec 2021 05:07), Max: 36.9 (26 Dec 2021 13:18)  T(F): 98.4 (27 Dec 2021 05:07), Max: 98.5 (26 Dec 2021 13:18)  HR: 62 (27 Dec 2021 11:22) (56 - 64)  BP: 160/76 (27 Dec 2021 05:07) (112/49 - 160/76)  BP(mean): --  RR: 18 (27 Dec 2021 05:07) (18 - 18)  SpO2: 98% (27 Dec 2021 11:22) (98% - 100%)

## 2021-12-27 NOTE — DIETITIAN INITIAL EVALUATION ADULT. - PERTINENT MEDS FT
MEDICATIONS  (STANDING):  amLODIPine   Tablet 10 milliGRAM(s) Oral daily  aspirin  chewable 81 milliGRAM(s) Oral daily  atorvastatin 40 milliGRAM(s) Oral at bedtime  benztropine 2 milliGRAM(s) Oral daily  cefTRIAXone   IVPB 1000 milliGRAM(s) IV Intermittent every 24 hours  dextrose 5% + sodium chloride 0.45%. 1000 milliLiter(s) (75 mL/Hr) IV Continuous <Continuous>  diVALproex  milliGRAM(s) Oral daily  diVALproex  milliGRAM(s) Oral at bedtime  FLUoxetine 60 milliGRAM(s) Oral daily  heparin   Injectable 5000 Unit(s) SubCutaneous every 8 hours  levothyroxine 175 MICROGram(s) Oral daily  lisinopril 40 milliGRAM(s) Oral daily  metoprolol succinate  milliGRAM(s) Oral daily  risperiDONE   Tablet 1 milliGRAM(s) Oral two times a day

## 2021-12-27 NOTE — PROGRESS NOTE ADULT - PROBLEM SELECTOR PLAN 9
TSH - 0.07  on levothyroxine 200mcg at home  hold for now until T3 and Free T4 is available -  unable to addon labs today, will draw in AM  Levothyroxine 175mcg (reduced dose) TSH - 0.07  on levothyroxine 200mcg at home  Levothyroxine 175mcg (reduced dose)

## 2021-12-27 NOTE — BH CONSULTATION LIAISON ASSESSMENT NOTE - RISK ASSESSMENT
RF: psych hx, hx psych hospitalization  PF: currently engaged in care, currently taking medications, has been cooperative, no acute mood/psychotic sxs, no current safety concern

## 2021-12-27 NOTE — BH CONSULTATION LIAISON ASSESSMENT NOTE - NSSUICPROTFACT_PSY_ALL_CORE
Identifies reasons for living/Fear of death or the actual act of killing self/Cultural, spiritual and/or moral attitudes against suicide/Positive therapeutic relationships

## 2021-12-27 NOTE — DIETITIAN INITIAL EVALUATION ADULT. - PROBLEM SELECTOR PLAN 3
pt with cr of 2.69, unknown baseline  likely pre-renal in setting of poor po intake at home as per   f/u urine studies  iv fluids  moitor bmp  can consider Renal/ bladder ultrasound plan per MD

## 2021-12-27 NOTE — BH CONSULTATION LIAISON ASSESSMENT NOTE - ADDITIONAL DETAILS ALL
none known, pt reports last hospitalization was in 2000 for "irregular behavior" and has been living at residence for the past 20 years

## 2021-12-27 NOTE — PROGRESS NOTE ADULT - PROBLEM SELECTOR PLAN 4
Resolved  pt with cr of 2.69, unknown baseline   likely pre-renal in setting of poor po intake at home as per   repeat Cr 1.05  continue IVF   BMP in AM   AYAN resolved, continue to trend Cr  lisinopril for HTN, monitor Cr Resolved  pt with cr of 2.69, unknown baseline   likely pre-renal in setting of poor po intake at home as per   repeat Cr 1.05  AYAN resolved, continue to trend Cr  lisinopril for HTN, monitor Cr

## 2021-12-27 NOTE — BH CONSULTATION LIAISON ASSESSMENT NOTE - SUMMARY
60yo F, domiciled at residence for people who have mental disabilities (through [facility] for [facility]), PMH HTN, HLD, hypothyroidism, PPH bipolar disorder (on VPA 250mg qAM/500mg qHS, Prozac 60mg daily, Risperdal 1mg BID), hx psych hospitalization, pt reports last in 2000 for irregular behavior, who presented to the hospital for AMS, found to be bacteremic and treated. Psych now consulted for clearance prior to placement at [facility] due to psych hx.     Patient currently does not present with any acute mood or psychotic symptoms. There are no acute safety concerns. She has been cooperative and there have been no behavioral issues. Does not meet criteria for involuntary psych hospitalization at this time. Can continue current medications. Psychiatrically cleared for placement.

## 2021-12-27 NOTE — PROGRESS NOTE ADULT - PROBLEM SELECTOR PLAN 1
pt with ams, progressively worsening for 1 week  has hx of bipolar disorder and hospitalizations due changes in behavior (last one in [place])  CTH negative for acute pathology, no focal neurological findings other than ams, but exam difficult to assess in current status.   likely due to infection (+UA, incontinence) and also worsening of dementia   Continue ceftriaxone for now   UCx: NGTD  1st BCx grew GNRs Psychrobacter species  2nd BCx Coagulase negative Staph  consult ID for ABx guidance given Psychrobacter species (no sensitivities)  Repeat blood cultures NGTD pt with ams, progressively worsening for 1 week  has hx of bipolar disorder and hospitalizations due changes in behavior (last one in [place])  CTH negative for acute pathology, no focal neurological findings other than ams, but exam difficult to assess in current status.   likely due to infection (+UA, incontinence) and also worsening of dementia   Continue ceftriaxone for now   UCx: NGTD  1st BCx grew GNRs Psychrobacter species  2nd BCx Coagulase negative Staph  consult ID for ABx guidance given Psychrobacter species (no sensitivities)  Repeat blood cultures NGTD  on Rocephin Day 7, will consider ceftin tmmrw for 14 days total

## 2021-12-27 NOTE — BH CONSULTATION LIAISON ASSESSMENT NOTE - HPI (INCLUDE ILLNESS QUALITY, SEVERITY, DURATION, TIMING, CONTEXT, MODIFYING FACTORS, ASSOCIATED SIGNS AND SYMPTOMS)
60yo F, domiciled at residence for people who have mental disabilities (through [facility]itional [facility] for [facility]), PMH HTN, HLD, hypothyroidism, PPH bipolar disorder (on VPA 250mg qAM/500mg qHS, Prozac 60mg daily, Risperdal 1mg BID), hx psych hospitalization, pt reports last in 2000 for irregular behavior, who presented to the hospital for AMS, found to be bacteremic and treated. Psych now consulted for clearance prior to placement at [facility] due to psych hx.     Per primary team there has been no behavioral dysregulation and patient has not required any PRN medications for agitation. Has been cooperative and taking medications regularly. On interview by telecart patient is AOx2, attentive, pleasant. She reports that her mood is "fine." States that she does not have contact with any family or friends but has "a ." She denies any hallucinations, ideas of reference, fear, feeling paranoid. She denies any difficulty with sleep or appetite. She denies any thoughts of self harm, SI, harm towards others, HI. She denies any substance use. She reports that she likes her residence. She denies any other concerns. Throughout interview patient attempted to answer best as possible but presented limited in her abilities to elaborate. She was calm throughout.

## 2021-12-27 NOTE — DIETITIAN INITIAL EVALUATION ADULT. - FACTORS AFF FOOD INTAKE
acute on chronic comorbidities including encephalopathy, dementia/change in mental status/difficulty chewing/difficulty swallowing

## 2021-12-27 NOTE — DIETITIAN INITIAL EVALUATION ADULT. - PERTINENT LABORATORY DATA
12-26 Na145 mmol/L Glu 97 mg/dL K+ 4.3 mmol/L Cr  1.01 mg/dL BUN 20 mg/dL<H>   12-21 Phos 3.6 mg/dL   12-21 Alb 2.4 g/dL<L>       12-21 Chol 118 mg/dL LDL --    HDL 28 mg/dL<L> Trig 139 mg/dL  12-21-21 @ 09:12 HgbA1C 5.5 [4.0 - 5.6]

## 2021-12-27 NOTE — PROGRESS NOTE ADULT - ATTENDING COMMENTS
BRUNA today, patient denies any complaints  Exam unremarkable    A/P  #Bacteremia, psychrobacter species  #UTI  #Altered mental status, resolved. Back to baseline  #Toxic metabolic encephalopathy  #AYAN likely pre-renal improved with IVF  #HTN  #Progressive dementia  #Physical deconditioning  #Hypothyroidism on levothyroxine, Ft14 and TSH elevated.   -- repeat blood cx NGTD  -- continue ceftriaxone for 7 day course per ID  -- susceptibility testing per ID  -- PT recommending CRISTOBAL, medically cleared for discharge pending acceptance  -- decreased levothyroxine to 175mcg from 200mcg, will need repeat TSH check in 6 weeks .

## 2021-12-27 NOTE — PROGRESS NOTE ADULT - PROBLEM SELECTOR PLAN 6
pt with hgb of 10.3, unknown baseline  f/u anemia panel  monitor h/h pt with hgb of 10.3 > 9.9, unknown baseline  MCV 96  iron def anemia with elevated ferritin  B12 WNL  likely combination of SHARON and ACD  no sings of active bleeding

## 2021-12-27 NOTE — PROGRESS NOTE ADULT - PROBLEM SELECTOR PLAN 2
Pt with + UA, wbc of 13  Urine cultures; NGTD, 1st BCx grew GNRs Psychrobacter species  2nd BCx Coagulase negative Staph  continue ceftriaxone for a total of 7 days.   Ceftin for a total of 14 days  c/w iv fluids  monitor mental status Pt with + UA, wbc of 13  Urine cultures; NGTD, 1st BCx grew GNRs Psychrobacter species  2nd BCx Coagulase negative Staph  continue ceftriaxone for a total of 7 days. 12/27  Ceftin for a total of 14 days  c/w iv fluids  monitor mental status

## 2021-12-27 NOTE — PROGRESS NOTE ADULT - PROBLEM SELECTOR PLAN 7
pmh of htn on amlodipine, lisinopril, and toprol  resume amlodipine 10mg QD , toprol XL 100mg QD with parameter  will resume lisinopril 20mg pmh of htn on amlodipine, lisinopril, and toprol  amlodipine 10mg QD , toprol XL 100mg QD, lisinopril 20mg

## 2021-12-27 NOTE — DIETITIAN INITIAL EVALUATION ADULT. - PROBLEM SELECTOR PLAN 4
hx of bipolar disorder on multiple meds  hx of non compliance, but now workers of the program she lives in are giving her the medications  continue home meds once able to tolerate po plan per MD

## 2021-12-27 NOTE — DIETITIAN INITIAL EVALUATION ADULT. - PROBLEM SELECTOR PLAN 1
pt with ams, progressively worsening for 1 week  has hx of bipolar disorder and hospitalizations due changes in behavior (last one in [place])  CTH negative for acute pathology, no focal neurological findings other than ams, but exam difficult to assess in current status.   likely due to infection (+UA, incontinence)  Continue abx  r/o metabolic changes, f/u tsh  IV fluids  f/u cultures  NPO for now, monitor mentation, advance diet as tolerated  aspiration precautions plan per MD

## 2021-12-27 NOTE — DIETITIAN INITIAL EVALUATION ADULT. - OTHER INFO
Pt from a "Level 2 conjugate program" for people with mental disabilities, alert, verbally responsive, confused with dementia; Limited intake/wt change history data available at present; s/p swallow evaluation with Minced and Moist, Mildly Thicken liquid recommended 12/21/21; no GI distress reported at present, tolerating meals well per nursing; s/p Psychiatric,  consult; Pending discharge planning to skilled nursing facility when medically ready

## 2021-12-28 LAB
ANION GAP SERPL CALC-SCNC: 7 MMOL/L — SIGNIFICANT CHANGE UP (ref 5–17)
BUN SERPL-MCNC: 19 MG/DL — HIGH (ref 7–18)
CALCIUM SERPL-MCNC: 9.1 MG/DL — SIGNIFICANT CHANGE UP (ref 8.4–10.5)
CHLORIDE SERPL-SCNC: 110 MMOL/L — HIGH (ref 96–108)
CO2 SERPL-SCNC: 25 MMOL/L — SIGNIFICANT CHANGE UP (ref 22–31)
CREAT SERPL-MCNC: 1.13 MG/DL — SIGNIFICANT CHANGE UP (ref 0.5–1.3)
CULTURE RESULTS: SIGNIFICANT CHANGE UP
CULTURE RESULTS: SIGNIFICANT CHANGE UP
GLUCOSE SERPL-MCNC: 87 MG/DL — SIGNIFICANT CHANGE UP (ref 70–99)
HCT VFR BLD CALC: 32.6 % — LOW (ref 34.5–45)
HGB BLD-MCNC: 10.2 G/DL — LOW (ref 11.5–15.5)
MCHC RBC-ENTMCNC: 29.9 PG — SIGNIFICANT CHANGE UP (ref 27–34)
MCHC RBC-ENTMCNC: 31.3 GM/DL — LOW (ref 32–36)
MCV RBC AUTO: 95.6 FL — SIGNIFICANT CHANGE UP (ref 80–100)
NRBC # BLD: 0 /100 WBCS — SIGNIFICANT CHANGE UP (ref 0–0)
PLATELET # BLD AUTO: 326 K/UL — SIGNIFICANT CHANGE UP (ref 150–400)
POTASSIUM SERPL-MCNC: 4.3 MMOL/L — SIGNIFICANT CHANGE UP (ref 3.5–5.3)
POTASSIUM SERPL-SCNC: 4.3 MMOL/L — SIGNIFICANT CHANGE UP (ref 3.5–5.3)
RBC # BLD: 3.41 M/UL — LOW (ref 3.8–5.2)
RBC # FLD: 12.2 % — SIGNIFICANT CHANGE UP (ref 10.3–14.5)
SODIUM SERPL-SCNC: 142 MMOL/L — SIGNIFICANT CHANGE UP (ref 135–145)
SPECIMEN SOURCE: SIGNIFICANT CHANGE UP
SPECIMEN SOURCE: SIGNIFICANT CHANGE UP
WBC # BLD: 11.85 K/UL — HIGH (ref 3.8–10.5)
WBC # FLD AUTO: 11.85 K/UL — HIGH (ref 3.8–10.5)

## 2021-12-28 PROCEDURE — 99233 SBSQ HOSP IP/OBS HIGH 50: CPT | Mod: GC

## 2021-12-28 RX ORDER — CEFUROXIME AXETIL 250 MG
250 TABLET ORAL EVERY 12 HOURS
Refills: 0 | Status: COMPLETED | OUTPATIENT
Start: 2021-12-28 | End: 2022-01-04

## 2021-12-28 RX ADMIN — HEPARIN SODIUM 5000 UNIT(S): 5000 INJECTION INTRAVENOUS; SUBCUTANEOUS at 06:09

## 2021-12-28 RX ADMIN — LISINOPRIL 40 MILLIGRAM(S): 2.5 TABLET ORAL at 06:09

## 2021-12-28 RX ADMIN — Medication 100 MILLIGRAM(S): at 06:09

## 2021-12-28 RX ADMIN — AMLODIPINE BESYLATE 10 MILLIGRAM(S): 2.5 TABLET ORAL at 06:09

## 2021-12-28 RX ADMIN — Medication 250 MILLIGRAM(S): at 18:20

## 2021-12-28 RX ADMIN — Medication 60 MILLIGRAM(S): at 12:34

## 2021-12-28 RX ADMIN — DIVALPROEX SODIUM 250 MILLIGRAM(S): 500 TABLET, DELAYED RELEASE ORAL at 12:34

## 2021-12-28 RX ADMIN — ATORVASTATIN CALCIUM 40 MILLIGRAM(S): 80 TABLET, FILM COATED ORAL at 21:32

## 2021-12-28 RX ADMIN — DIVALPROEX SODIUM 500 MILLIGRAM(S): 500 TABLET, DELAYED RELEASE ORAL at 21:33

## 2021-12-28 RX ADMIN — RISPERIDONE 1 MILLIGRAM(S): 4 TABLET ORAL at 18:20

## 2021-12-28 RX ADMIN — Medication 2 MILLIGRAM(S): at 12:34

## 2021-12-28 RX ADMIN — Medication 175 MICROGRAM(S): at 06:09

## 2021-12-28 RX ADMIN — HEPARIN SODIUM 5000 UNIT(S): 5000 INJECTION INTRAVENOUS; SUBCUTANEOUS at 21:32

## 2021-12-28 RX ADMIN — Medication 81 MILLIGRAM(S): at 12:34

## 2021-12-28 RX ADMIN — RISPERIDONE 1 MILLIGRAM(S): 4 TABLET ORAL at 06:10

## 2021-12-28 NOTE — PROGRESS NOTE ADULT - PROBLEM SELECTOR PLAN 6
pt with hgb of 10.3 > 9.9> 10.2 unknown baseline  MCV 96  iron def anemia with elevated ferritin  B12 WNL  likely combination of SHARON and ACD  no signs of active bleeding

## 2021-12-28 NOTE — PROGRESS NOTE ADULT - PROBLEM SELECTOR PLAN 7
pmh of htn on amlodipine, lisinopril, and toprol  amlodipine 10mg QD , toprol XL 100mg QD, lisinopril 20mg

## 2021-12-28 NOTE — PROGRESS NOTE ADULT - ATTENDING COMMENTS
Patient seen and examined. Case discussed with Dr. Ledesma. Offers no new complaints.    On exam, currently AOx2 and denies complaints.    #Toxic Metabolic Encephalopathy likely 2/2 Psychrobacter bacteremia, now resolved with treatment of infection    #Bacteremia, psychrobacter species  -Completed 7 day course of ceftriaxone  -Can dc antibiotics per ID recommendations    #AYAN likely pre-renal improved with IVF  #HTN  #Progressive dementia  #Physical deconditioning  -Will need CRISTOBAL on discharge    #Hypothyroidism on levothyroxine, Ft14 and TSH elevated.   -Decreased levothyroxine to 175mcg from 200mcg, will need repeat TSH check in 6 weeks .

## 2021-12-28 NOTE — PROGRESS NOTE ADULT - SUBJECTIVE AND OBJECTIVE BOX
PGY-1 Progress Note discussed with attending    PAGER #: [[phone]] TILL 5:00 PM  PLEASE CONTACT ON CALL TEAM:  - On Call Team (Please refer to Michael) FROM 5:00 PM - 8:30PM  - Nightfloat Team FROM 8:30 -7:30 AM      INTERVAL HPI/OVERNIGHT EVENTS: no acute overnight events, patient was seen and examined at bedside, has no complaints        REVIEW OF SYSTEMS:  CONSTITUTIONAL: No fever, weight loss, or fatigue  RESPIRATORY: No cough, wheezing, chills or hemoptysis; No shortness of breath  CARDIOVASCULAR: No chest pain, palpitations, dizziness, or leg swelling  GASTROINTESTINAL: No abdominal pain. No nausea, vomiting, or hematemesis; No diarrhea or constipation. No melena or hematochezia.  GENITOURINARY: No dysuria or hematuria, urinary frequency  NEUROLOGICAL: No headaches, memory loss, loss of strength, numbness, or tremors  SKIN: No itching, burning, rashes, or lesions     Vital Signs Last 24 Hrs  T(C): 36.8 (28 Dec 2021 05:18), Max: 37.1 (27 Dec 2021 14:19)  T(F): 98.2 (28 Dec 2021 05:18), Max: 98.7 (27 Dec 2021 14:19)  HR: 67 (28 Dec 2021 05:18) (60 - 70)  BP: 127/75 (28 Dec 2021 05:18) (106/68 - 127/75)  BP(mean): --  RR: 18 (28 Dec 2021 05:18) (18 - 18)  SpO2: 98% (28 Dec 2021 05:18) (96% - 98%)    PHYSICAL EXAMINATION:  GENERAL: NAD,   HEAD:  Atraumatic, Normocephalic  EYES:  conjunctiva and sclera clear  NECK: Supple, No JVD, Normal thyroid  CHEST/LUNG: Clear to auscultation. Clear to percussion bilaterally; No rales, rhonchi, wheezing, or rubs  HEART: Regular rate and rhythm; No murmurs, rubs, or gallops  ABDOMEN: Soft, Nontender, Nondistended; Bowel sounds present  NERVOUS SYSTEM:  Alert & Oriented X 2-3,    EXTREMITIES:  2+ Peripheral Pulses, No clubbing, cyanosis, or edema  SKIN: warm dry                          10.2   11.85 )-----------( 326      ( 28 Dec 2021 07:26 )             32.6     12-28    142  |  110<H>  |  19<H>  ----------------------------<  87  4.3   |  25  |  1.13    Ca    9.1      28 Dec 2021 07:26                CAPILLARY BLOOD GLUCOSE      RADIOLOGY & ADDITIONAL TESTS:

## 2021-12-28 NOTE — PROGRESS NOTE ADULT - ASSESSMENT
Pt is a 62 y/o F with pmh of Bipolar disorder, HTN, HLD, Hypothyroidism, who presented to the ED BIBEMS due to AMS. 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 At the time of exam patient was AO x 0, arousable but lethargic, s/p Haldol and ativan for episode of agitation. Most information obtained from ED note and , Michelle [phone]. Pt is AO x 2 at baseline, for the past 2 weeks there have been changes in her activity and mental status, becoming more confused, not walking or doing much, having gait instability. Today patient was found to be sitting in her own urine, and when told so by the , she hadn't realized it. As per Michelle, no Fevers or chills reported, no sick contacts, no complains that patient expressed, other than the noticeable change in mental status. Patient was admitted at [facility] form 4/19/21 to 5/28/21 due to change in behavior, not able to care for herself and not taking her medications. Since then patient has required more assistance and they have people give her the medications twice a day. She also hasn't been eating well for the past 2 weeks.     In ED CT scan negative for acute disease, UA + for infection. WBC of 13, vira. S/p Rocephin, and haldold/ativan for episode of agitation. spoke with brother Jose Canela via phone at [phone], pt has been declined mentally due to dementia and also possible neuropathy per him , asking to have w/u for neuropathy. He also mentioned that she may need placement due to her ADLs and cognition

## 2021-12-28 NOTE — PROGRESS NOTE ADULT - PROBLEM SELECTOR PLAN 1
pt with ams, progressively worsening for 1 week  has hx of bipolar disorder and hospitalizations due changes in behavior (last one in [place])  CTH negative for acute pathology, no focal neurological findings other than ams, but exam difficult to assess in current status.   likely due to infection (+UA, incontinence) and also worsening of dementia   Continue ceftriaxone for now   UCx: NGTD  1st BCx grew GNRs Psychrobacter species  2nd BCx Coagulase negative Staph  consult ID for ABx guidance given Psychrobacter species (no sensitivities)  Repeat blood cultures NGTD  completed rocephin 7 days  started ceftin for 7 more days, to complete 14 days of abx for bacteremia

## 2021-12-28 NOTE — PROGRESS NOTE ADULT - PROBLEM SELECTOR PLAN 4
Resolved  pt with cr of 2.69, unknown baseline   likely pre-renal in setting of poor po intake at home as per   repeat Cr 1.05  AYAN resolved, continue to trend Cr  lisinopril for HTN, monitor Cr

## 2021-12-28 NOTE — PROGRESS NOTE ADULT - PROBLEM SELECTOR PLAN 2
Pt with + UA, wbc of 13  Urine cultures; NGTD, 1st BCx grew GNRs Psychrobacter species  2nd BCx Coagulase negative Staph  continue ceftriaxone for a total of 7 days. 12/27  Ceftin for a total of 14 days  c/w iv fluids  monitor mental status  started ceftin for 7 more days, to complete 14 days of abx

## 2021-12-28 NOTE — PROGRESS NOTE ADULT - PROBLEM SELECTOR PLAN 5
hx of bipolar disorder on multiple meds  hx of non compliance, but now workers of the program she lives in are giving her the medications  continue divalproex 250mg QD 500mg QHS, prozac 60mg QD, risperdal 1mg BID home dose  Tele Psych was consulted: clear patient for placement

## 2021-12-29 LAB
-  AMPICILLIN/SULBACTAM: SIGNIFICANT CHANGE UP
-  CEFAZOLIN: SIGNIFICANT CHANGE UP
-  CLINDAMYCIN: SIGNIFICANT CHANGE UP
-  ERYTHROMYCIN: SIGNIFICANT CHANGE UP
-  GENTAMICIN: SIGNIFICANT CHANGE UP
-  OXACILLIN: SIGNIFICANT CHANGE UP
-  PENICILLIN: SIGNIFICANT CHANGE UP
-  RIFAMPIN: SIGNIFICANT CHANGE UP
-  TETRACYCLINE: SIGNIFICANT CHANGE UP
-  TRIMETHOPRIM/SULFAMETHOXAZOLE: SIGNIFICANT CHANGE UP
-  VANCOMYCIN: SIGNIFICANT CHANGE UP
ANION GAP SERPL CALC-SCNC: 5 MMOL/L — SIGNIFICANT CHANGE UP (ref 5–17)
BUN SERPL-MCNC: 21 MG/DL — HIGH (ref 7–18)
CALCIUM SERPL-MCNC: 8.9 MG/DL — SIGNIFICANT CHANGE UP (ref 8.4–10.5)
CHLORIDE SERPL-SCNC: 111 MMOL/L — HIGH (ref 96–108)
CO2 SERPL-SCNC: 27 MMOL/L — SIGNIFICANT CHANGE UP (ref 22–31)
CREAT SERPL-MCNC: 1.11 MG/DL — SIGNIFICANT CHANGE UP (ref 0.5–1.3)
GLUCOSE SERPL-MCNC: 85 MG/DL — SIGNIFICANT CHANGE UP (ref 70–99)
HCT VFR BLD CALC: 29.6 % — LOW (ref 34.5–45)
HGB BLD-MCNC: 9.3 G/DL — LOW (ref 11.5–15.5)
MCHC RBC-ENTMCNC: 30.2 PG — SIGNIFICANT CHANGE UP (ref 27–34)
MCHC RBC-ENTMCNC: 31.4 GM/DL — LOW (ref 32–36)
MCV RBC AUTO: 96.1 FL — SIGNIFICANT CHANGE UP (ref 80–100)
METHOD TYPE: SIGNIFICANT CHANGE UP
NRBC # BLD: 0 /100 WBCS — SIGNIFICANT CHANGE UP (ref 0–0)
ORGANISM # SPEC MICROSCOPIC CNT: SIGNIFICANT CHANGE UP
ORGANISM # SPEC MICROSCOPIC CNT: SIGNIFICANT CHANGE UP
PLATELET # BLD AUTO: 333 K/UL — SIGNIFICANT CHANGE UP (ref 150–400)
POTASSIUM SERPL-MCNC: 4 MMOL/L — SIGNIFICANT CHANGE UP (ref 3.5–5.3)
POTASSIUM SERPL-SCNC: 4 MMOL/L — SIGNIFICANT CHANGE UP (ref 3.5–5.3)
RBC # BLD: 3.08 M/UL — LOW (ref 3.8–5.2)
RBC # FLD: 12.3 % — SIGNIFICANT CHANGE UP (ref 10.3–14.5)
SODIUM SERPL-SCNC: 143 MMOL/L — SIGNIFICANT CHANGE UP (ref 135–145)
WBC # BLD: 10.75 K/UL — HIGH (ref 3.8–10.5)
WBC # FLD AUTO: 10.75 K/UL — HIGH (ref 3.8–10.5)

## 2021-12-29 PROCEDURE — 99233 SBSQ HOSP IP/OBS HIGH 50: CPT | Mod: GC

## 2021-12-29 RX ADMIN — RISPERIDONE 1 MILLIGRAM(S): 4 TABLET ORAL at 17:16

## 2021-12-29 RX ADMIN — RISPERIDONE 1 MILLIGRAM(S): 4 TABLET ORAL at 06:05

## 2021-12-29 RX ADMIN — Medication 60 MILLIGRAM(S): at 12:25

## 2021-12-29 RX ADMIN — Medication 81 MILLIGRAM(S): at 12:25

## 2021-12-29 RX ADMIN — Medication 2 MILLIGRAM(S): at 12:25

## 2021-12-29 RX ADMIN — AMLODIPINE BESYLATE 10 MILLIGRAM(S): 2.5 TABLET ORAL at 06:03

## 2021-12-29 RX ADMIN — Medication 175 MICROGRAM(S): at 06:58

## 2021-12-29 RX ADMIN — DIVALPROEX SODIUM 250 MILLIGRAM(S): 500 TABLET, DELAYED RELEASE ORAL at 12:25

## 2021-12-29 RX ADMIN — Medication 250 MILLIGRAM(S): at 17:16

## 2021-12-29 RX ADMIN — HEPARIN SODIUM 5000 UNIT(S): 5000 INJECTION INTRAVENOUS; SUBCUTANEOUS at 06:03

## 2021-12-29 RX ADMIN — ATORVASTATIN CALCIUM 40 MILLIGRAM(S): 80 TABLET, FILM COATED ORAL at 21:34

## 2021-12-29 RX ADMIN — Medication 100 MILLIGRAM(S): at 06:05

## 2021-12-29 RX ADMIN — HEPARIN SODIUM 5000 UNIT(S): 5000 INJECTION INTRAVENOUS; SUBCUTANEOUS at 17:16

## 2021-12-29 RX ADMIN — DIVALPROEX SODIUM 500 MILLIGRAM(S): 500 TABLET, DELAYED RELEASE ORAL at 21:34

## 2021-12-29 RX ADMIN — LISINOPRIL 40 MILLIGRAM(S): 2.5 TABLET ORAL at 06:05

## 2021-12-29 RX ADMIN — Medication 250 MILLIGRAM(S): at 06:03

## 2021-12-29 RX ADMIN — HEPARIN SODIUM 5000 UNIT(S): 5000 INJECTION INTRAVENOUS; SUBCUTANEOUS at 21:34

## 2021-12-29 NOTE — PROGRESS NOTE ADULT - SUBJECTIVE AND OBJECTIVE BOX
PGY-1 Progress Note discussed with attending    PAGER #: [[phone]] TILL 5:00 PM  PLEASE CONTACT ON CALL TEAM:  - On Call Team (Please refer to Michael) FROM 5:00 PM - 8:30PM  - Nightfloat Team FROM 8:30 -7:30 AM      INTERVAL HPI/OVERNIGHT EVENTS: no acute overnight events, patient was seen and examined at bedside, has no complaints       REVIEW OF SYSTEMS:  CONSTITUTIONAL: No fever, weight loss, or fatigue  RESPIRATORY: No cough, wheezing, chills or hemoptysis; No shortness of breath  CARDIOVASCULAR: No chest pain, palpitations, dizziness, or leg swelling  GASTROINTESTINAL: No abdominal pain. No nausea, vomiting, or hematemesis; No diarrhea or constipation. No melena or hematochezia.  GENITOURINARY: No dysuria or hematuria, urinary frequency  NEUROLOGICAL: No headaches, memory loss, loss of strength, numbness, or tremors  SKIN: No itching, burning, rashes, or lesions     Vital Signs Last 24 Hrs  T(C): 37 (29 Dec 2021 05:15), Max: 37 (29 Dec 2021 05:15)  T(F): 98.6 (29 Dec 2021 05:15), Max: 98.6 (29 Dec 2021 05:15)  HR: 60 (29 Dec 2021 05:15) (58 - 84)  BP: 119/78 (29 Dec 2021 05:15) (119/78 - 138/82)  BP(mean): --  RR: 18 (29 Dec 2021 05:15) (18 - 18)  SpO2: 99% (29 Dec 2021 05:15) (98% - 100%)    PHYSICAL EXAMINATION:  GENERAL: NAD,   HEAD:  Atraumatic, Normocephalic  EYES:  conjunctiva and sclera clear  NECK: Supple, No JVD, Normal thyroid  CHEST/LUNG: Clear to auscultation. Clear to percussion bilaterally; No rales, rhonchi, wheezing, or rubs  HEART: Regular rate and rhythm; No murmurs, rubs, or gallops  ABDOMEN: Soft, Nontender, Nondistended; Bowel sounds present  NERVOUS SYSTEM:  Alert & Oriented X 2-3,    EXTREMITIES:  2+ Peripheral Pulses, No clubbing, cyanosis, or edema  SKIN: warm dry                          9.3    10.75 )-----------( 333      ( 29 Dec 2021 05:56 )             29.6     12-29    143  |  111<H>  |  21<H>  ----------------------------<  85  4.0   |  27  |  1.11    Ca    8.9      29 Dec 2021 05:56                CAPILLARY BLOOD GLUCOSE      RADIOLOGY & ADDITIONAL TESTS:

## 2021-12-29 NOTE — PROGRESS NOTE ADULT - ATTENDING COMMENTS
Patient seen and examined. Case discussed with Dr. Ledesma. Offers no new complaints and is happy to have a visitor. Ms. Canela is a 62 yo F with schizophrenia, dementia who presented with toxic metabolic encephalopathy likely 2/2 Psychrobacter bacteremia. On exam, remains AOx2 and denies complaints.    #Toxic Metabolic Encephalopathy likely 2/2 Psychrobacter bacteremia, now resolved with treatment of infection    #Bacteremia, psychrobacter species  -Completed 7 day course of ceftriaxone  -To complete 7 more days of ceftin per ID recommendations for 14 day course    #AYAN likely pre-renal improved with IVF  #HTN  #Progressive dementia  #Physical deconditioning  -Will need CRISTOBAL on discharge, awaiting accepting facility.     #Hypothyroidism on levothyroxine, Ft14 and TSH elevated.   -Decreased levothyroxine to 175mcg from 200mcg, will need repeat TSH check in 6 weeks . Patient seen and examined. Case discussed with Dr. Ledesma. Offers no new complaints and is happy to have a visitor. Ms. Canela is a 62 yo F with schizophrenia, dementia who presented with toxic metabolic encephalopathy likely 2/2 Psychrobacter bacteremia. On exam, remains AOx2 and denies complaints.    #Toxic Metabolic Encephalopathy likely 2/2 Psychrobacter bacteremia, now resolved with treatment of infection    #Bacteremia, psychrobacter species  -Completed 7 day course of ceftriaxone  -To complete 7 more days of ceftin per ID recommendations for 14 day course    #Schizophrenia  -Continue VPA 250mg QDaily and 500mg QHS  -Continue prozac 60mg QDaily  -Continue Risperdal 1mg BID home dose    #AYAN likely pre-renal improved with IVF  #HTN  #Progressive dementia  #Physical deconditioning  -Will need CRISTOBAL on discharge, awaiting accepting facility.     #Hypothyroidism on levothyroxine, Ft14 and TSH elevated.   -Decreased levothyroxine to 175mcg from 200mcg, will need repeat TSH check in 6 weeks

## 2021-12-29 NOTE — PROGRESS NOTE ADULT - PROBLEM SELECTOR PLAN 6
pt with hgb of 10.3 > 9.9> 10.2 unknown baseline  MCV 96  iron def anemia with elevated ferritin  B12 WNL  likely combination of SHARON and ACD  no signs of active bleeding pt with hgb of 10.3 > 9.9> 10.2 > 9.3 unknown baseline  MCV 96  iron def anemia with elevated ferritin  B12 WNL  likely combination of SHARON and ACD  no signs of active bleeding

## 2021-12-29 NOTE — PROGRESS NOTE ADULT - PROBLEM SELECTOR PLAN 2
Pt with + UA, wbc of 13  Urine cultures; NGTD, 1st BCx grew GNRs Psychrobacter species  2nd BCx Coagulase negative Staph  continue ceftriaxone for a total of 7 days. 12/27  Ceftin for a total of 14 days  c/w iv fluids  monitor mental status  on ceftin until 1/3/22

## 2021-12-29 NOTE — PROGRESS NOTE ADULT - PROBLEM SELECTOR PLAN 1
pt with ams, progressively worsening for 1 week  has hx of bipolar disorder and hospitalizations due changes in behavior (last one in [place])  CTH negative for acute pathology, no focal neurological findings other than ams, but exam difficult to assess in current status.   likely due to infection (+UA, incontinence) and also worsening of dementia   Continue ceftriaxone for now   UCx: NGTD  1st BCx grew GNRs Psychrobacter species  2nd BCx Coagulase negative Staph  Repeat blood cultures NGTD  completed Rocephin 7 days  c/w ceftin, to complete 14 days of abx for bacteremia until 1/3/22

## 2021-12-29 NOTE — PROGRESS NOTE ADULT - ASSESSMENT
Pt is a 60 y/o F with pmh of Bipolar disorder, HTN, HLD, Hypothyroidism, who presented to the ED BIBEMS due to AMS. 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 At the time of exam patient was AO x 0, arousable but lethargic, s/p Haldol and ativan for episode of agitation. Most information obtained from ED note and , Michelle [phone]. Pt is AO x 2 at baseline, for the past 2 weeks there have been changes in her activity and mental status, becoming more confused, not walking or doing much, having gait instability. Today patient was found to be sitting in her own urine, and when told so by the , she hadn't realized it. As per Michelle, no Fevers or chills reported, no sick contacts, no complains that patient expressed, other than the noticeable change in mental status. Patient was admitted at [facility] form 4/19/21 to 5/28/21 due to change in behavior, not able to care for herself and not taking her medications. Since then patient has required more assistance and they have people give her the medications twice a day. She also hasn't been eating well for the past 2 weeks.     In ED CT scan negative for acute disease, UA + for infection. WBC of 13, vira. S/p Rocephin, and haldold/ativan for episode of agitation. spoke with brother Jose Canela via phone at [phone], pt has been declined mentally due to dementia and also possible neuropathy per him , asking to have w/u for neuropathy. He also mentioned that she may need placement due to her ADLs and cognition

## 2021-12-30 LAB
ANION GAP SERPL CALC-SCNC: 5 MMOL/L — SIGNIFICANT CHANGE UP (ref 5–17)
ANISOCYTOSIS BLD QL: SLIGHT — SIGNIFICANT CHANGE UP
BASOPHILS # BLD AUTO: 0 K/UL — SIGNIFICANT CHANGE UP (ref 0–0.2)
BASOPHILS NFR BLD AUTO: 0 % — SIGNIFICANT CHANGE UP (ref 0–2)
BUN SERPL-MCNC: 22 MG/DL — HIGH (ref 7–18)
CALCIUM SERPL-MCNC: 8.8 MG/DL — SIGNIFICANT CHANGE UP (ref 8.4–10.5)
CHLORIDE SERPL-SCNC: 111 MMOL/L — HIGH (ref 96–108)
CO2 SERPL-SCNC: 25 MMOL/L — SIGNIFICANT CHANGE UP (ref 22–31)
CREAT SERPL-MCNC: 1.21 MG/DL — SIGNIFICANT CHANGE UP (ref 0.5–1.3)
EOSINOPHIL # BLD AUTO: 0.36 K/UL — SIGNIFICANT CHANGE UP (ref 0–0.5)
EOSINOPHIL NFR BLD AUTO: 3 % — SIGNIFICANT CHANGE UP (ref 0–6)
GLUCOSE SERPL-MCNC: 77 MG/DL — SIGNIFICANT CHANGE UP (ref 70–99)
HCT VFR BLD CALC: 29.8 % — LOW (ref 34.5–45)
HGB BLD-MCNC: 9.3 G/DL — LOW (ref 11.5–15.5)
HYPOCHROMIA BLD QL: SLIGHT — SIGNIFICANT CHANGE UP
LYMPHOCYTES # BLD AUTO: 3.59 K/UL — HIGH (ref 1–3.3)
LYMPHOCYTES # BLD AUTO: 30 % — SIGNIFICANT CHANGE UP (ref 13–44)
MANUAL SMEAR VERIFICATION: SIGNIFICANT CHANGE UP
MCHC RBC-ENTMCNC: 30.3 PG — SIGNIFICANT CHANGE UP (ref 27–34)
MCHC RBC-ENTMCNC: 31.2 GM/DL — LOW (ref 32–36)
MCV RBC AUTO: 97.1 FL — SIGNIFICANT CHANGE UP (ref 80–100)
MONOCYTES # BLD AUTO: 0.36 K/UL — SIGNIFICANT CHANGE UP (ref 0–0.9)
MONOCYTES NFR BLD AUTO: 3 % — SIGNIFICANT CHANGE UP (ref 2–14)
MYELOCYTES NFR BLD: 1 % — HIGH (ref 0–0)
NEUTROPHILS # BLD AUTO: 7.42 K/UL — HIGH (ref 1.8–7.4)
NEUTROPHILS NFR BLD AUTO: 61 % — SIGNIFICANT CHANGE UP (ref 43–77)
NEUTS BAND # BLD: 1 % — SIGNIFICANT CHANGE UP (ref 0–8)
NRBC # BLD: 0 /100 — SIGNIFICANT CHANGE UP (ref 0–0)
PLAT MORPH BLD: NORMAL — SIGNIFICANT CHANGE UP
PLATELET # BLD AUTO: 330 K/UL — SIGNIFICANT CHANGE UP (ref 150–400)
PLATELET COUNT - ESTIMATE: NORMAL — SIGNIFICANT CHANGE UP
POIKILOCYTOSIS BLD QL AUTO: SLIGHT — SIGNIFICANT CHANGE UP
POLYCHROMASIA BLD QL SMEAR: SLIGHT — SIGNIFICANT CHANGE UP
POTASSIUM SERPL-MCNC: 4.4 MMOL/L — SIGNIFICANT CHANGE UP (ref 3.5–5.3)
POTASSIUM SERPL-SCNC: 4.4 MMOL/L — SIGNIFICANT CHANGE UP (ref 3.5–5.3)
RBC # BLD: 3.07 M/UL — LOW (ref 3.8–5.2)
RBC # FLD: 12.6 % — SIGNIFICANT CHANGE UP (ref 10.3–14.5)
RBC BLD AUTO: ABNORMAL
SODIUM SERPL-SCNC: 141 MMOL/L — SIGNIFICANT CHANGE UP (ref 135–145)
VARIANT LYMPHS # BLD: 1 % — SIGNIFICANT CHANGE UP (ref 0–6)
WBC # BLD: 11.96 K/UL — HIGH (ref 3.8–10.5)
WBC # FLD AUTO: 11.96 K/UL — HIGH (ref 3.8–10.5)

## 2021-12-30 PROCEDURE — 99233 SBSQ HOSP IP/OBS HIGH 50: CPT | Mod: GC

## 2021-12-30 RX ORDER — CEFUROXIME AXETIL 250 MG
1 TABLET ORAL
Qty: 6 | Refills: 0
Start: 2021-12-30 | End: 2022-01-01

## 2021-12-30 RX ADMIN — ATORVASTATIN CALCIUM 40 MILLIGRAM(S): 80 TABLET, FILM COATED ORAL at 21:33

## 2021-12-30 RX ADMIN — DIVALPROEX SODIUM 250 MILLIGRAM(S): 500 TABLET, DELAYED RELEASE ORAL at 11:48

## 2021-12-30 RX ADMIN — DIVALPROEX SODIUM 500 MILLIGRAM(S): 500 TABLET, DELAYED RELEASE ORAL at 21:33

## 2021-12-30 RX ADMIN — HEPARIN SODIUM 5000 UNIT(S): 5000 INJECTION INTRAVENOUS; SUBCUTANEOUS at 21:34

## 2021-12-30 RX ADMIN — Medication 175 MICROGRAM(S): at 05:11

## 2021-12-30 RX ADMIN — Medication 60 MILLIGRAM(S): at 11:48

## 2021-12-30 RX ADMIN — HEPARIN SODIUM 5000 UNIT(S): 5000 INJECTION INTRAVENOUS; SUBCUTANEOUS at 13:51

## 2021-12-30 RX ADMIN — Medication 81 MILLIGRAM(S): at 11:47

## 2021-12-30 RX ADMIN — Medication 250 MILLIGRAM(S): at 17:20

## 2021-12-30 RX ADMIN — AMLODIPINE BESYLATE 10 MILLIGRAM(S): 2.5 TABLET ORAL at 05:11

## 2021-12-30 RX ADMIN — Medication 250 MILLIGRAM(S): at 05:11

## 2021-12-30 RX ADMIN — RISPERIDONE 1 MILLIGRAM(S): 4 TABLET ORAL at 17:20

## 2021-12-30 RX ADMIN — HEPARIN SODIUM 5000 UNIT(S): 5000 INJECTION INTRAVENOUS; SUBCUTANEOUS at 05:12

## 2021-12-30 RX ADMIN — LISINOPRIL 40 MILLIGRAM(S): 2.5 TABLET ORAL at 05:11

## 2021-12-30 RX ADMIN — RISPERIDONE 1 MILLIGRAM(S): 4 TABLET ORAL at 05:11

## 2021-12-30 RX ADMIN — Medication 2 MILLIGRAM(S): at 11:48

## 2021-12-30 NOTE — PROGRESS NOTE ADULT - PROBLEM SELECTOR PLAN 1
pt with ams, progressively worsening for 1 week  has hx of bipolar disorder and hospitalizations due changes in behavior (last one in [place])  CTH negative for acute pathology, no focal neurological findings other than ams, but exam difficult to assess in current status.   likely due to infection (+UA, incontinence) and also worsening of dementia   UCx: NGTD  1st BCx grew GNRs Psychrobacter species  2nd BCx Coagulase negative Staph  Repeat blood cultures NGTD  completed Rocephin 7 days  c/w ceftin, to complete 14 days of abx for bacteremia until 1/3/22  at baseline ; RESOLVED

## 2021-12-30 NOTE — PROGRESS NOTE ADULT - SUBJECTIVE AND OBJECTIVE BOX
PGY-1 Progress Note discussed with attending    PAGER #: [[phone]] TILL 5:00 PM  PLEASE CONTACT ON CALL TEAM:  - On Call Team (Please refer to Michael) FROM 5:00 PM - 8:30PM  - Nightfloat Team FROM 8:30 -7:30 AM    CHIEF COMPLAINT & BRIEF HOSPITAL COURSE:    INTERVAL HPI/OVERNIGHT EVENTS:       REVIEW OF SYSTEMS:  CONSTITUTIONAL: No fever, weight loss, or fatigue  RESPIRATORY: No cough, wheezing, chills or hemoptysis; No shortness of breath  CARDIOVASCULAR: No chest pain, palpitations, dizziness, or leg swelling  GASTROINTESTINAL: No abdominal pain. No nausea, vomiting, or hematemesis; No diarrhea or constipation. No melena or hematochezia.  GENITOURINARY: No dysuria or hematuria, urinary frequency  NEUROLOGICAL: No headaches, memory loss, loss of strength, numbness, or tremors  SKIN: No itching, burning, rashes, or lesions     Vital Signs Last 24 Hrs  T(C): 36.9 (30 Dec 2021 05:02), Max: 37 (29 Dec 2021 22:35)  T(F): 98.5 (30 Dec 2021 05:02), Max: 98.6 (29 Dec 2021 22:35)  HR: 58 (30 Dec 2021 05:02) (53 - 58)  BP: 134/74 (30 Dec 2021 05:02) (118/63 - 134/74)  BP(mean): --  RR: 18 (30 Dec 2021 05:02) (18 - 18)  SpO2: 99% (30 Dec 2021 05:02) (98% - 99%)    PHYSICAL EXAMINATION:  GENERAL: NAD,   HEAD:  Atraumatic, Normocephalic  EYES:  conjunctiva and sclera clear  NECK: Supple, No JVD, Normal thyroid  CHEST/LUNG: Clear to auscultation. Clear to percussion bilaterally; No rales, rhonchi, wheezing, or rubs  HEART: Regular rate and rhythm; No murmurs, rubs, or gallops  ABDOMEN: Soft, Nontender, Nondistended; Bowel sounds present  NERVOUS SYSTEM:  Alert & Oriented X3,    EXTREMITIES:  2+ Peripheral Pulses, No clubbing, cyanosis, or edema  SKIN: warm dry                          9.3    11.96 )-----------( 330      ( 30 Dec 2021 06:52 )             29.8     12-30    141  |  111<H>  |  22<H>  ----------------------------<  77  4.4   |  25  |  1.21    Ca    8.8      30 Dec 2021 06:52                CAPILLARY BLOOD GLUCOSE      RADIOLOGY & ADDITIONAL TESTS:                   PGY-1 Progress Note discussed with attending    PAGER #: [[phone]] TILL 5:00 PM  PLEASE CONTACT ON CALL TEAM:  - On Call Team (Please refer to Michael) FROM 5:00 PM - 8:30PM  - Nightfloat Team FROM 8:30 -7:30 AM      INTERVAL HPI/OVERNIGHT EVENTS: no acute overnight events. Patient has no complaints.       REVIEW OF SYSTEMS:  CONSTITUTIONAL: No fever, weight loss, or fatigue  RESPIRATORY: No cough, wheezing, chills or hemoptysis; No shortness of breath  CARDIOVASCULAR: No chest pain, palpitations, dizziness, or leg swelling  GASTROINTESTINAL: No abdominal pain. No nausea, vomiting, or hematemesis; No diarrhea or constipation. No melena or hematochezia.  GENITOURINARY: No dysuria or hematuria, urinary frequency  NEUROLOGICAL: No headaches, memory loss, loss of strength, numbness, or tremors  SKIN: No itching, burning, rashes, or lesions     Vital Signs Last 24 Hrs  T(C): 36.9 (30 Dec 2021 05:02), Max: 37 (29 Dec 2021 22:35)  T(F): 98.5 (30 Dec 2021 05:02), Max: 98.6 (29 Dec 2021 22:35)  HR: 58 (30 Dec 2021 05:02) (53 - 58)  BP: 134/74 (30 Dec 2021 05:02) (118/63 - 134/74)  BP(mean): --  RR: 18 (30 Dec 2021 05:02) (18 - 18)  SpO2: 99% (30 Dec 2021 05:02) (98% - 99%)    PHYSICAL EXAMINATION:  GENERAL: NAD,   HEAD:  Atraumatic, Normocephalic  EYES:  conjunctiva and sclera clear  NECK: Supple, No JVD, Normal thyroid  CHEST/LUNG: Clear to auscultation. Clear to percussion bilaterally; No rales, rhonchi, wheezing, or rubs  HEART: Regular rate and rhythm; No murmurs, rubs, or gallops  ABDOMEN: Soft, Nontender, Nondistended; Bowel sounds present  NERVOUS SYSTEM:  Alert & Oriented X 2-3,    EXTREMITIES:  2+ Peripheral Pulses, No clubbing, cyanosis, or edema  SKIN: warm dry                          9.3    11.96 )-----------( 330      ( 30 Dec 2021 06:52 )             29.8     12-30    141  |  111<H>  |  22<H>  ----------------------------<  77  4.4   |  25  |  1.21    Ca    8.8      30 Dec 2021 06:52                CAPILLARY BLOOD GLUCOSE      RADIOLOGY & ADDITIONAL TESTS:

## 2021-12-30 NOTE — PROGRESS NOTE ADULT - ASSESSMENT
Pt is a 62 y/o F with pmh of Bipolar disorder, HTN, HLD, Hypothyroidism, who presented to the ED BIBEMS due to AMS, found to be bacteremic

## 2021-12-30 NOTE — PROGRESS NOTE ADULT - PROBLEM SELECTOR PLAN 6
pt with hgb of 10.3 > 9.9> 10.2 > 9.3 unknown baseline  MCV 96  iron def anemia with elevated ferritin  B12 WNL  likely combination of SHARON and ACD  no signs of active bleeding

## 2021-12-30 NOTE — PROGRESS NOTE ADULT - ATTENDING COMMENTS
Patient seen and examined. Case discussed with housestaff. Offers no new complaints except that she is sleepy. Ms. Canela is a 60 yo F with schizophrenia, dementia who presented with toxic metabolic encephalopathy likely 2/2 Psychrobacter bacteremia. On exam, remains AOx2 and denies complaints.    #Toxic Metabolic Encephalopathy likely 2/2 Psychrobacter bacteremia, now resolved with treatment of infection    #Bacteremia, psychrobacter species  -Completed 7 day course of ceftriaxone  -To complete 7 more days of ceftin per ID recommendations for 14 day course    #Schizophrenia  -Continue VPA 250mg QDaily and 500mg QHS  -Continue prozac 60mg QDaily  -Continue Risperdal 1mg BID home dose    #AYAN likely pre-renal improved with IVF, resolved  #HTN  #Progressive dementia  #Physical deconditioning  -Will need [facility] on discharge, accepted by [facility], will need authorization and Level 2 screen    #Hypothyroidism on levothyroxine, Ft14 and TSH elevated.   -Decreased levothyroxine to 175mcg from 200mcg, will need repeat TSH check in 6 weeks.

## 2021-12-31 PROCEDURE — 99223 1ST HOSP IP/OBS HIGH 75: CPT

## 2021-12-31 PROCEDURE — 99233 SBSQ HOSP IP/OBS HIGH 50: CPT | Mod: GC

## 2021-12-31 RX ADMIN — DIVALPROEX SODIUM 500 MILLIGRAM(S): 500 TABLET, DELAYED RELEASE ORAL at 21:42

## 2021-12-31 RX ADMIN — HEPARIN SODIUM 5000 UNIT(S): 5000 INJECTION INTRAVENOUS; SUBCUTANEOUS at 14:29

## 2021-12-31 RX ADMIN — Medication 250 MILLIGRAM(S): at 05:20

## 2021-12-31 RX ADMIN — ATORVASTATIN CALCIUM 40 MILLIGRAM(S): 80 TABLET, FILM COATED ORAL at 21:41

## 2021-12-31 RX ADMIN — HEPARIN SODIUM 5000 UNIT(S): 5000 INJECTION INTRAVENOUS; SUBCUTANEOUS at 21:42

## 2021-12-31 RX ADMIN — RISPERIDONE 1 MILLIGRAM(S): 4 TABLET ORAL at 05:21

## 2021-12-31 RX ADMIN — Medication 175 MICROGRAM(S): at 05:21

## 2021-12-31 RX ADMIN — DIVALPROEX SODIUM 250 MILLIGRAM(S): 500 TABLET, DELAYED RELEASE ORAL at 11:27

## 2021-12-31 RX ADMIN — Medication 60 MILLIGRAM(S): at 11:27

## 2021-12-31 RX ADMIN — Medication 250 MILLIGRAM(S): at 17:55

## 2021-12-31 RX ADMIN — Medication 2 MILLIGRAM(S): at 11:27

## 2021-12-31 RX ADMIN — HEPARIN SODIUM 5000 UNIT(S): 5000 INJECTION INTRAVENOUS; SUBCUTANEOUS at 05:21

## 2021-12-31 RX ADMIN — Medication 81 MILLIGRAM(S): at 11:26

## 2021-12-31 RX ADMIN — RISPERIDONE 1 MILLIGRAM(S): 4 TABLET ORAL at 17:55

## 2021-12-31 NOTE — CONSULT NOTE ADULT - TIME BILLING
I counseled the primary team about the further testing indicated to help determine potential contributing factors to the patient's symptoms.

## 2021-12-31 NOTE — PROGRESS NOTE ADULT - ASSESSMENT
Pt is a 60 y/o F with pmh of Bipolar disorder, HTN, HLD, Hypothyroidism, who presented to the ED BIBEMS due to AMS, found to be bacteremic

## 2021-12-31 NOTE — CONSULT NOTE ADULT - SUBJECTIVE AND OBJECTIVE BOX
NEUROLOGY CONSULT NOTE    NAME:  LUIS WU      ASSESSMENT:  61 RHF with history of bipolar disorder, treated for bacteria but still with cognitive deficit and difficulty walking, raising concern for intracranial abnormality      RECOMMENDATIONS:    - MRI Brain w/wo Gadolinium approved to evaluate for intracranial abnormality, such as stroke or abscess       - Gadolinium can be administered for GFR 30-60 if IV fluids are also given       - If stroke is identified, can complete stroke workup    - Continue monitoring for infection and metabolic abnormalities, and treat as appropriate    - PT/OT for recovery of gait and balance    - DVT ppx: SCDs, Heparin        NOTE TO BE COMPLETED - PLEASE REFER TO ABOVE ONLY AND IGNORE INFORMATION BELOW    *******************************      CHIEF COMPLAINT:  Patient is a 61y old  Female who presents with a chief complaint of AMS (31 Dec 2021 08:53)      HPI:  Pt is a 62 y/o F with pmh of Bipolar disorder, HTN, HLD, Hypothyroidism, who presented to the ED BIBEMS due to AMS. 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 At the time of exam patient was AO x 0, arousable but lethargic, s/p Haldol and ativan for episode of agitation. Most information obtained from ED note and , Michelle [phone]. Pt is AO x 2 at baseline, for the past 2 weeks there have been changes in her activity and mental status, becoming more confused, not walking or doing much, having gait instability. Today patient was found to be sitting in her own urine, and when told so by the , she hadn't realized it. As per Michelle, no Fevers or chills reported, no sick contacts, no complains that patient expressed, other than the noticeable change in mental status. Patient was admitted at [facility] form 4/19/21 to 5/28/21 due to change in behavior, not able to care for herself and not taking her medications. Since then patient has required more assistance and they have people give her the medications twice a day. She also hasn't been eating well for the past 2 weeks. In ED CT scan negative for acute disease, UA + for infection. WBC of 13, vira. S/p Rocephin, and haldold/ativan for episode of agitation.  (20 Dec 2021 23:34)      NEURO HPI:      PAST MEDICAL & SURGICAL HISTORY:  Bipolar disorder  Hypertension  Hyperlipidemia  Hypothyroidism  No significant past surgical history        MEDICATIONS:  amLODIPine   Tablet 10 milliGRAM(s) Oral daily  aspirin  chewable 81 milliGRAM(s) Oral daily  atorvastatin 40 milliGRAM(s) Oral at bedtime  benztropine 2 milliGRAM(s) Oral daily  cefuroxime   Tablet 250 milliGRAM(s) Oral every 12 hours  dextrose 5% + sodium chloride 0.45%. 1000 milliLiter(s) IV Continuous <Continuous>  diVALproex  milliGRAM(s) Oral daily  diVALproex  milliGRAM(s) Oral at bedtime  FLUoxetine 60 milliGRAM(s) Oral daily  heparin   Injectable 5000 Unit(s) SubCutaneous every 8 hours  levothyroxine 175 MICROGram(s) Oral daily  lisinopril 40 milliGRAM(s) Oral daily  metoprolol succinate  milliGRAM(s) Oral daily  risperiDONE   Tablet 1 milliGRAM(s) Oral two times a day      ALLERGIES:  No Known Allergies      FAMILY HISTORY:  No pertinent family history in first degree relatives          SOCIAL HISTORY:  Denies alcohol, tobacco, or illicit drug use    REVIEW OF SYSTEMS:  GENERAL: No fever, weight changes, fatigue  EYES: No eye pain or discharge  EAR/NOSE/MOUTH/THROAT: No sinus or throat pain; No difficulty hearing  NECK: No pain or stiffness  RESPIRATORY: No cough, wheezing, chills, or hemoptysis  CARDIOVASCULAR: No chest pain, palpitations, shortness of breath, or dyspnea on exertion  GASTROINTESTINAL: No abdominal pain, nausea, vomiting, hematemesis, diarrhea, or constipation  GENITOURINARY: No dysuria, frequency, hematuria, or incontinence  SKIN: No rashes or lesions  ENDOCRINE: No heat or cold intolerance  HEMATOLOGIC: No easy bruising or bleeding  PSYCHIATRIC: No depression, anxiety, or mood swings  MUSCULOSKELETAL: No joint pain or swelling  NEUROLOGICAL: As per HPI        OBJECTIVE:    Vital Signs Last 24 Hrs  T(C): 36.8 (31 Dec 2021 21:49), Max: 36.9 (31 Dec 2021 04:55)  T(F): 98.3 (31 Dec 2021 21:49), Max: 98.5 (31 Dec 2021 04:55)  HR: 60 (31 Dec 2021 21:49) (56 - 64)  BP: 120/76 (31 Dec 2021 21:49) (100/63 - 133/72)  BP(mean): --  RR: 19 (31 Dec 2021 21:49) (18 - 19)  SpO2: 98% (31 Dec 2021 21:49) (96% - 98%)    General Examination:  General: No acute distress  HEENT: Atraumatic, Normocephalic  Respiratory: CTA B/l.  No crackles, rhonchi, or wheezes.  Cardiovascular: RRR.  Normal S1 & S2.  Normal b/l radial and pedal pulses.    Neurological Examination:  General / Mental Status: AAO x 3.  No aphasia or dysarthria.  Naming and repetition intact.  Cranial Nerves: VFF x 4.  PERRL.  EOMI x 2, No nystagmus or diplopia.  B/l V1-V3 equal and intact to light touch and pinprick.  Symmetric facial movement and palate elevation.  B/l hearing equal to finger rub.  5/5 strength with b/l sternocleidomastoid & trapezius.  Midline tongue protrusion, with no atrophy or fasciculations.  Motor: Normal bulk & tone in all four extremities.  5/5 strength throughout all four extremities.  No downward drift, rigidity, spasticity, or tremors in any of the four extremities.  Sensory: Intact to light touch and pinprick in all four extremities.  Negative Romberg.  Reflex: 2+ and symmetric at b/l biceps, triceps, brachioradialis, patellae, and ankles.  Downgoing toes b/l.  Coordination: No dysmetria with b/l finger-to-nose and heel raise tests.  Symmetric rapid alternating movements b/l.  Gait: Normal, narrow-based gait.  No difficulty with tiptoe, heel, and tandem gaits.        LABORATORY VALUES:                        9.3    11.96 )-----------( 330      ( 30 Dec 2021 06:52 )             29.8       12-30    141  |  111<H>  |  22<H>  ----------------------------<  77  4.4   |  25  |  1.21    Ca    8.8      30 Dec 2021 06:52          12-21 Chol 118 LDL -- HDL 28<L> Trig 139                    NEUROIMAGING:          Please contact the Neurology consult service with any neurological questions.    Fletcher Schneider MD   of Neurology  [facility] NEUROLOGY CONSULT NOTE    NAME:  LUIS WU      ASSESSMENT:  61 RHF with history of bipolar disorder, treated for bacteria but still with cognitive deficit and difficulty walking, raising concern for intracranial abnormality      RECOMMENDATIONS:    - MRI Brain w/wo Gadolinium approved to evaluate for intracranial abnormality, such as stroke or abscess       - Gadolinium can be administered for GFR 30-60 if IV fluids are also given       - If stroke is identified, can complete stroke workup    - Continue monitoring for infection and metabolic abnormalities, and treat as appropriate    - PT/OT for recovery of gait and balance    - DVT ppx: SCDs, Heparin        *******************************      CHIEF COMPLAINT:  Patient is a 61y old  Female who presents with a chief complaint of AMS (31 Dec 2021 08:53)      HPI:  Pt is a 62 y/o F with pmh of Bipolar disorder, HTN, HLD, Hypothyroidism, who presented to the ED BIBEMS due to AMS. 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 At the time of exam patient was AO x 0, arousable but lethargic, s/p Haldol and ativan for episode of agitation. Most information obtained from ED note and , Michelle [phone]. Pt is AO x 2 at baseline, for the past 2 weeks there have been changes in her activity and mental status, becoming more confused, not walking or doing much, having gait instability. Today patient was found to be sitting in her own urine, and when told so by the , she hadn't realized it. As per Michelle, no Fevers or chills reported, no sick contacts, no complains that patient expressed, other than the noticeable change in mental status. Patient was admitted at [facility] form 4/19/21 to 5/28/21 due to change in behavior, not able to care for herself and not taking her medications. Since then patient has required more assistance and they have people give her the medications twice a day. She also hasn't been eating well for the past 2 weeks. In ED CT scan negative for acute disease, UA + for infection. WBC of 13, AYAN. S/p Rocephin, and Haldol/Ativan for episode of agitation.  (20 Dec 2021 23:34)      NEURO HPI:  61 RHF with bipolar disorder who presented with acute encephalopathy, fount to have acute renal injury and urinary tract infection. Despite treatment of these diagnoses, she has continued to have confusion, as well as difficulty maintaining her balance.      PAST MEDICAL & SURGICAL HISTORY:  Bipolar disorder  Hypertension  Hyperlipidemia  Hypothyroidism  No significant past surgical history        MEDICATIONS:  amLODIPine   Tablet 10 milliGRAM(s) Oral daily  aspirin  chewable 81 milliGRAM(s) Oral daily  atorvastatin 40 milliGRAM(s) Oral at bedtime  benztropine 2 milliGRAM(s) Oral daily  cefuroxime   Tablet 250 milliGRAM(s) Oral every 12 hours  dextrose 5% + sodium chloride 0.45%. 1000 milliLiter(s) IV Continuous <Continuous>  diVALproex  milliGRAM(s) Oral daily  diVALproex  milliGRAM(s) Oral at bedtime  FLUoxetine 60 milliGRAM(s) Oral daily  heparin   Injectable 5000 Unit(s) SubCutaneous every 8 hours  levothyroxine 175 MICROGram(s) Oral daily  lisinopril 40 milliGRAM(s) Oral daily  metoprolol succinate  milliGRAM(s) Oral daily  risperiDONE   Tablet 1 milliGRAM(s) Oral two times a day      ALLERGIES:  No Known Allergies      FAMILY HISTORY:  No pertinent family history in first degree relatives      SOCIAL HISTORY:  Denies alcohol, tobacco, or illicit drug use      REVIEW OF SYSTEMS:  GENERAL: No fever, weight changes, fatigue  EYES: No eye pain or discharge  EAR/NOSE/MOUTH/THROAT: No sinus or throat pain; No difficulty hearing  NECK: No pain or stiffness  RESPIRATORY: No cough, wheezing, chills, or hemoptysis  CARDIOVASCULAR: No chest pain, palpitations, shortness of breath, or dyspnea on exertion  GASTROINTESTINAL: No abdominal pain, nausea, vomiting, hematemesis, diarrhea, or constipation  GENITOURINARY: Recent AYAN and UTI  SKIN: No rashes or lesions  ENDOCRINE: No heat or cold intolerance  HEMATOLOGIC: No easy bruising or bleeding  PSYCHIATRIC: As per HPI  MUSCULOSKELETAL: No joint pain or swelling  NEUROLOGICAL: As per HPI        OBJECTIVE:    Vital Signs Last 24 Hrs  T(C): 36.8 (31 Dec 2021 21:49), Max: 36.9 (31 Dec 2021 04:55)  T(F): 98.3 (31 Dec 2021 21:49), Max: 98.5 (31 Dec 2021 04:55)  HR: 60 (31 Dec 2021 21:49) (56 - 64)  BP: 120/76 (31 Dec 2021 21:49) (100/63 - 133/72)  RR: 19 (31 Dec 2021 21:49) (18 - 19)  SpO2: 98% (31 Dec 2021 21:49) (96% - 98%)    General Examination:  General: No acute distress  HEENT: Atraumatic, Normocephalic  Respiratory: CTA B/l.  No crackles, rhonchi, or wheezes.  Cardiovascular: RRR.  Normal S1 & S2.  Normal b/l radial and pedal pulses.    Neurological Examination:  General / Mental Status: AAO x 2 (not to time).  No aphasia or dysarthria.  Cranial Nerves: VFF x 4.  PERRL.  EOMI x 2, No nystagmus or diplopia.  B/l V1-V3 equal and intact to light touch and pinprick.  Symmetric facial movement and palate elevation.  B/l hearing equal to finger rub.  At least 4/5 strength with b/l sternocleidomastoid & trapezius.  Midline tongue protrusion, with appearance of tied tongue.  Motor: Normal bulk & tone in all four extremities.  At least 4/5 strength throughout b/l upper extremities, without downward drift.  At least 3/5 strength throughout b/l lower extremities, with downward drift.  No rigidity, spasticity, or tremors in any of the four extremities.  Sensory: Intact to light touch and pinprick in all four extremities.  Reflex: 1+ and symmetric at b/l biceps, triceps, brachioradialis, patellae, and ankles.  Downgoing toes b/l.  Coordination: No dysmetria with b/l finger-to-nose and heel raise tests.  Gait and Romberg sign testing deferred due to b/l lower extremity weakness.        LABORATORY VALUES:                        9.3    11.96 )-----------( 330      ( 30 Dec 2021 06:52 )             29.8       12-30    141  |  111<H>  |  22<H>  ----------------------------<  77  4.4   |  25  |  1.21    Ca    8.8      30 Dec 2021 06:52      12-21 Chol 118 LDL 62 HDL 28<L> Trig 139          NEUROIMAGING:      CT Head (12/20/21):  - No acute intracranial abnormality  - Mild chronic microvascular changes  - Mild diffuse atrophy          Please contact the Neurology consult service with any neurological questions.    Fletcher Schneider MD   of Neurology  [facility]

## 2021-12-31 NOTE — PROGRESS NOTE ADULT - SUBJECTIVE AND OBJECTIVE BOX
PGY-1 Progress Note discussed with attending    PAGER #: [[phone]] TILL 5:00 PM  PLEASE CONTACT ON CALL TEAM:  - On Call Team (Please refer to Michael) FROM 5:00 PM - 8:30PM  - Nightfloat Team FROM 8:30 -7:30 AM      INTERVAL HPI/OVERNIGHT EVENTS: no acute overnight events. Patients brother refused discharge stated tht patient is not able to walk and is unsafe. Patient was seen and examined at bedside. Answering few questions, but then remains non verbal when asked further questions.       REVIEW OF SYSTEMS:  negative unless stated above    Vital Signs Last 24 Hrs  T(C): 36.9 (31 Dec 2021 04:55), Max: 36.9 (31 Dec 2021 04:55)  T(F): 98.5 (31 Dec 2021 04:55), Max: 98.5 (31 Dec 2021 04:55)  HR: 64 (31 Dec 2021 04:55) (54 - 65)  BP: 100/63 (31 Dec 2021 04:55) (100/63 - 118/71)  BP(mean): --  RR: 18 (31 Dec 2021 04:55) (17 - 18)  SpO2: 98% (31 Dec 2021 04:55) (98% - 100%)    PHYSICAL EXAMINATION:  GENERAL: NAD,   HEAD:  Atraumatic, Normocephalic  EYES:  conjunctiva and sclera clear  NECK: Supple, No JVD, Normal thyroid  CHEST/LUNG: Clear to auscultation. Clear to percussion bilaterally; No rales, rhonchi, wheezing, or rubs  HEART: Regular rate and rhythm; No murmurs, rubs, or gallops  ABDOMEN: Soft, Nontender, Nondistended; Bowel sounds present  NERVOUS SYSTEM:  Alert & Oriented X1,    EXTREMITIES:  2+ Peripheral Pulses, No clubbing, cyanosis, or edema  SKIN: warm dry                          9.3    11.96 )-----------( 330      ( 30 Dec 2021 06:52 )             29.8     12-30    141  |  111<H>  |  22<H>  ----------------------------<  77  4.4   |  25  |  1.21    Ca    8.8      30 Dec 2021 06:52                CAPILLARY BLOOD GLUCOSE      RADIOLOGY & ADDITIONAL TESTS:

## 2021-12-31 NOTE — PROGRESS NOTE ADULT - PROBLEM SELECTOR PLAN 1
pt with ams, progressively worsening for 1 week  has hx of bipolar disorder and hospitalizations due changes in behavior (last one in [place])  CTH negative for acute pathology, no focal neurological findings other than ams, but exam difficult to assess in current status.   likely due to infection (+UA, incontinence) and also worsening of dementia   UCx: NGTD  1st BCx grew GNRs Psychrobacter species  2nd BCx Coagulase negative Staph  Repeat blood cultures NGTD  completed Rocephin 7 days  c/w ceftin, to complete 14 days of abx for bacteremia until 1/3/22  at baseline ; RESOLVED pt with ams, progressively worsening for 1 week  has hx of bipolar disorder and hospitalizations due changes in behavior (last one in [place])  CTH negative for acute pathology, no focal neurological findings other than ams, but exam difficult to assess in current status.   likely due to infection (+UA, incontinence) and also worsening of dementia   UCx: NGTD  1st BCx grew GNRs Psychrobacter species  2nd BCx Coagulase negative Staph  Repeat blood cultures NGTD  completed Rocephin 7 days  c/w ceftin, to complete 14 days of abx for bacteremia until 1/3/22  at baseline ; RESOLVED  family is concerned that patient is unable to walk, as baseline used to walk with walker  Consulted Dr. Schneider, if further work up is needed

## 2021-12-31 NOTE — PROGRESS NOTE ADULT - ATTENDING COMMENTS
Patient seen and examined. Case discussed with housestaff. Offers no new complaints. Reports she is resting well since coming to the hospital. Ms. Canela is a 62 yo F with schizophrenia, dementia who presented with toxic metabolic encephalopathy likely 2/2 Psychrobacter bacteremia. On exam, remains AOx2 and denies complaints. Plan was to transfer to [facility] on 12/30/21 but brother refused transfer because of patient's inability to walk without rolling walker as 3 months ago she could walk with a cane.     #Toxic Metabolic Encephalopathy likely 2/2 Psychrobacter bacteremia, now resolved with treatment of infection    #Bacteremia, psychrobacter species  -Completed 7 day course of ceftriaxone  -To complete 7 more days of ceftin per ID recommendations for 14 day course (through January 4)    #Schizophrenia  -Continue VPA 250mg QDaily and 500mg QHS  -Continue prozac 60mg QDaily  -Continue Risperdal 1mg BID home dose    #AYAN likely pre-renal improved with IVF, resolved  #HTN  #Progressive dementia  #Physical deconditioning  -Will need [facility] on discharge, accepted by [facility], will need authorization and Level 2 screen    #Hypothyroidism on levothyroxine, Ft14 and TSH elevated.   -Decreased levothyroxine to 175mcg from 200mcg, will need repeat TSH check in 6 weeks.     #Ambulatory Dysfunction  -Will consult neuro per brother's request to determine if further w/u is indicated  -Dc to [facility] likely Monday given holiday weekend

## 2022-01-01 LAB
ALBUMIN SERPL ELPH-MCNC: 2.2 G/DL — LOW (ref 3.5–5)
ALP SERPL-CCNC: 66 U/L — SIGNIFICANT CHANGE UP (ref 40–120)
ALT FLD-CCNC: 9 U/L DA — LOW (ref 10–60)
ANION GAP SERPL CALC-SCNC: 7 MMOL/L — SIGNIFICANT CHANGE UP (ref 5–17)
AST SERPL-CCNC: 8 U/L — LOW (ref 10–40)
BASOPHILS # BLD AUTO: 0.06 K/UL — SIGNIFICANT CHANGE UP (ref 0–0.2)
BASOPHILS NFR BLD AUTO: 0.6 % — SIGNIFICANT CHANGE UP (ref 0–2)
BILIRUB SERPL-MCNC: 0.2 MG/DL — SIGNIFICANT CHANGE UP (ref 0.2–1.2)
BUN SERPL-MCNC: 24 MG/DL — HIGH (ref 7–18)
CALCIUM SERPL-MCNC: 8.7 MG/DL — SIGNIFICANT CHANGE UP (ref 8.4–10.5)
CHLORIDE SERPL-SCNC: 109 MMOL/L — HIGH (ref 96–108)
CO2 SERPL-SCNC: 25 MMOL/L — SIGNIFICANT CHANGE UP (ref 22–31)
CREAT SERPL-MCNC: 1.14 MG/DL — SIGNIFICANT CHANGE UP (ref 0.5–1.3)
EOSINOPHIL # BLD AUTO: 0.23 K/UL — SIGNIFICANT CHANGE UP (ref 0–0.5)
EOSINOPHIL NFR BLD AUTO: 2.1 % — SIGNIFICANT CHANGE UP (ref 0–6)
GLUCOSE SERPL-MCNC: 89 MG/DL — SIGNIFICANT CHANGE UP (ref 70–99)
HCT VFR BLD CALC: 30.2 % — LOW (ref 34.5–45)
HGB BLD-MCNC: 9.4 G/DL — LOW (ref 11.5–15.5)
IMM GRANULOCYTES NFR BLD AUTO: 2.1 % — HIGH (ref 0–1.5)
LYMPHOCYTES # BLD AUTO: 3.77 K/UL — HIGH (ref 1–3.3)
LYMPHOCYTES # BLD AUTO: 35.2 % — SIGNIFICANT CHANGE UP (ref 13–44)
MAGNESIUM SERPL-MCNC: 2.1 MG/DL — SIGNIFICANT CHANGE UP (ref 1.6–2.6)
MCHC RBC-ENTMCNC: 30.1 PG — SIGNIFICANT CHANGE UP (ref 27–34)
MCHC RBC-ENTMCNC: 31.1 GM/DL — LOW (ref 32–36)
MCV RBC AUTO: 96.8 FL — SIGNIFICANT CHANGE UP (ref 80–100)
MONOCYTES # BLD AUTO: 0.87 K/UL — SIGNIFICANT CHANGE UP (ref 0–0.9)
MONOCYTES NFR BLD AUTO: 8.1 % — SIGNIFICANT CHANGE UP (ref 2–14)
NEUTROPHILS # BLD AUTO: 5.56 K/UL — SIGNIFICANT CHANGE UP (ref 1.8–7.4)
NEUTROPHILS NFR BLD AUTO: 51.9 % — SIGNIFICANT CHANGE UP (ref 43–77)
NRBC # BLD: 0 /100 WBCS — SIGNIFICANT CHANGE UP (ref 0–0)
PHOSPHATE SERPL-MCNC: 3.8 MG/DL — SIGNIFICANT CHANGE UP (ref 2.5–4.5)
PLATELET # BLD AUTO: 325 K/UL — SIGNIFICANT CHANGE UP (ref 150–400)
POTASSIUM SERPL-MCNC: 3.8 MMOL/L — SIGNIFICANT CHANGE UP (ref 3.5–5.3)
POTASSIUM SERPL-SCNC: 3.8 MMOL/L — SIGNIFICANT CHANGE UP (ref 3.5–5.3)
PROT SERPL-MCNC: 7 G/DL — SIGNIFICANT CHANGE UP (ref 6–8.3)
RBC # BLD: 3.12 M/UL — LOW (ref 3.8–5.2)
RBC # FLD: 12.4 % — SIGNIFICANT CHANGE UP (ref 10.3–14.5)
SODIUM SERPL-SCNC: 141 MMOL/L — SIGNIFICANT CHANGE UP (ref 135–145)
WBC # BLD: 10.71 K/UL — HIGH (ref 3.8–10.5)
WBC # FLD AUTO: 10.71 K/UL — HIGH (ref 3.8–10.5)

## 2022-01-01 PROCEDURE — 99232 SBSQ HOSP IP/OBS MODERATE 35: CPT | Mod: GC

## 2022-01-01 RX ORDER — HEPARIN SODIUM 5000 [USP'U]/ML
5000 INJECTION INTRAVENOUS; SUBCUTANEOUS EVERY 8 HOURS
Refills: 0 | Status: DISCONTINUED | OUTPATIENT
Start: 2022-01-01 | End: 2022-01-06

## 2022-01-01 RX ADMIN — Medication 175 MICROGRAM(S): at 06:06

## 2022-01-01 RX ADMIN — Medication 81 MILLIGRAM(S): at 11:34

## 2022-01-01 RX ADMIN — DIVALPROEX SODIUM 500 MILLIGRAM(S): 500 TABLET, DELAYED RELEASE ORAL at 21:26

## 2022-01-01 RX ADMIN — ATORVASTATIN CALCIUM 40 MILLIGRAM(S): 80 TABLET, FILM COATED ORAL at 21:26

## 2022-01-01 RX ADMIN — HEPARIN SODIUM 5000 UNIT(S): 5000 INJECTION INTRAVENOUS; SUBCUTANEOUS at 21:26

## 2022-01-01 RX ADMIN — HEPARIN SODIUM 5000 UNIT(S): 5000 INJECTION INTRAVENOUS; SUBCUTANEOUS at 13:09

## 2022-01-01 RX ADMIN — Medication 100 MILLIGRAM(S): at 06:05

## 2022-01-01 RX ADMIN — AMLODIPINE BESYLATE 10 MILLIGRAM(S): 2.5 TABLET ORAL at 06:05

## 2022-01-01 RX ADMIN — DIVALPROEX SODIUM 250 MILLIGRAM(S): 500 TABLET, DELAYED RELEASE ORAL at 11:34

## 2022-01-01 RX ADMIN — RISPERIDONE 1 MILLIGRAM(S): 4 TABLET ORAL at 06:05

## 2022-01-01 RX ADMIN — Medication 250 MILLIGRAM(S): at 17:07

## 2022-01-01 RX ADMIN — Medication 60 MILLIGRAM(S): at 11:34

## 2022-01-01 RX ADMIN — LISINOPRIL 40 MILLIGRAM(S): 2.5 TABLET ORAL at 06:05

## 2022-01-01 RX ADMIN — RISPERIDONE 1 MILLIGRAM(S): 4 TABLET ORAL at 17:14

## 2022-01-01 RX ADMIN — Medication 2 MILLIGRAM(S): at 11:34

## 2022-01-01 RX ADMIN — HEPARIN SODIUM 5000 UNIT(S): 5000 INJECTION INTRAVENOUS; SUBCUTANEOUS at 06:05

## 2022-01-01 RX ADMIN — Medication 250 MILLIGRAM(S): at 06:05

## 2022-01-01 NOTE — PROGRESS NOTE ADULT - SUBJECTIVE AND OBJECTIVE BOX
PGY-1 Progress Note discussed with attending    PAGER #: [[phone]] TILL 5:00 PM  PLEASE CONTACT ON CALL TEAM:  - On Call Team (Please refer to Michael) FROM 5:00 PM - 8:30PM  - Nightfloat Team FROM 8:30 -7:30 AM    INTERVAL HPI/OVERNIGHT EVENTS: no acute overnight events. Patient was seen and examined this morning. Is answering most questions, denies any fever, chills, nausea, chest pain, or abdominal pain.       REVIEW OF SYSTEMS:  negative unless stated above    Vital Signs Last 24 Hrs  T(C): 37.1 (01 Jan 2022 05:31), Max: 37.1 (01 Jan 2022 05:31)  T(F): 98.8 (01 Jan 2022 05:31), Max: 98.8 (01 Jan 2022 05:31)  HR: 65 (01 Jan 2022 05:31) (56 - 65)  BP: 131/80 (01 Jan 2022 05:31) (120/76 - 133/72)  BP(mean): --  RR: 18 (01 Jan 2022 05:31) (18 - 19)  SpO2: 96% (01 Jan 2022 05:31) (96% - 98%)    PHYSICAL EXAMINATION:  GENERAL: NAD,   HEAD:  Atraumatic, Normocephalic  EYES:  conjunctiva and sclera clear  NECK: Supple, No JVD, Normal thyroid  CHEST/LUNG: Clear to auscultation. Clear to percussion bilaterally; No rales, rhonchi, wheezing, or rubs  HEART: Regular rate and rhythm; No murmurs, rubs, or gallops  ABDOMEN: Soft, Nontender, Nondistended; Bowel sounds present  NERVOUS SYSTEM:  Alert & Oriented X 1-2,    EXTREMITIES:  2+ Peripheral Pulses, No clubbing, cyanosis, or edema  SKIN: warm dry                          9.4    10.71 )-----------( 325      ( 01 Jan 2022 05:56 )             30.2     01-01    141  |  109<H>  |  24<H>  ----------------------------<  89  3.8   |  25  |  1.14    Ca    8.7      01 Jan 2022 05:56  Phos  3.8     01-01  Mg     2.1     01-01    TPro  7.0  /  Alb  2.2<L>  /  TBili  0.2  /  DBili  x   /  AST  8<L>  /  ALT  9<L>  /  AlkPhos  66  01-01    LIVER FUNCTIONS - ( 01 Jan 2022 05:56 )  Alb: 2.2 g/dL / Pro: 7.0 g/dL / ALK PHOS: 66 U/L / ALT: 9 U/L DA / AST: 8 U/L / GGT: x                   CAPILLARY BLOOD GLUCOSE      RADIOLOGY & ADDITIONAL TESTS:

## 2022-01-01 NOTE — PROGRESS NOTE ADULT - ATTENDING COMMENTS
Patient seen and examined. Case discussed with housestaff. Offers no new complaints. Reports she is resting well since coming to the hospital. Ms. Canela is a 62 yo F with schizophrenia, dementia who presented with toxic metabolic encephalopathy likely 2/2 Psychrobacter bacteremia. On exam, remains AOx2 and denies complaints. Plan was to transfer to [facility] on 12/30/21 but brother refused transfer because of patient's inability to walk without rolling walker as 3 months ago she could walk with a cane.     #Toxic Metabolic Encephalopathy likely 2/2 Psychrobacter bacteremia, now resolved with treatment of infection  -Seen by neuro who recommend MRI w/wo contrast brain to assess for structural reasons behind patient's AMS and inability to ambulate    #Bacteremia, psychrobacter species  -Completed 7 day course of ceftriaxone  -To complete 7 more days of ceftin per ID recommendations for 14 day course (through January 4)    #Schizophrenia  -Continue VPA 250mg QDaily and 500mg QHS  -Continue prozac 60mg QDaily  -Continue Risperdal 1mg BID home dose    #AYAN likely pre-renal improved with IVF, resolved  #HTN  #Progressive dementia  #Physical deconditioning  -Will need [facility] on discharge, accepted by [facility], authorization and Level 2 screen completed    #Hypothyroidism on levothyroxine, Ft14 and TSH elevated.   -Decreased levothyroxine to 175mcg from 200mcg, will need repeat TSH check in 6 weeks.     #Ambulatory Dysfunction  -Neuro recommends MRI w/wo contrast brain to assess for structural etiology

## 2022-01-02 LAB
ANION GAP SERPL CALC-SCNC: 5 MMOL/L — SIGNIFICANT CHANGE UP (ref 5–17)
BASOPHILS # BLD AUTO: 0.06 K/UL — SIGNIFICANT CHANGE UP (ref 0–0.2)
BASOPHILS NFR BLD AUTO: 0.6 % — SIGNIFICANT CHANGE UP (ref 0–2)
BUN SERPL-MCNC: 25 MG/DL — HIGH (ref 7–18)
CALCIUM SERPL-MCNC: 9 MG/DL — SIGNIFICANT CHANGE UP (ref 8.4–10.5)
CHLORIDE SERPL-SCNC: 111 MMOL/L — HIGH (ref 96–108)
CO2 SERPL-SCNC: 27 MMOL/L — SIGNIFICANT CHANGE UP (ref 22–31)
CREAT SERPL-MCNC: 1.27 MG/DL — SIGNIFICANT CHANGE UP (ref 0.5–1.3)
EOSINOPHIL # BLD AUTO: 0.23 K/UL — SIGNIFICANT CHANGE UP (ref 0–0.5)
EOSINOPHIL NFR BLD AUTO: 2.1 % — SIGNIFICANT CHANGE UP (ref 0–6)
GLUCOSE SERPL-MCNC: 85 MG/DL — SIGNIFICANT CHANGE UP (ref 70–99)
HCT VFR BLD CALC: 29.2 % — LOW (ref 34.5–45)
HGB BLD-MCNC: 9.2 G/DL — LOW (ref 11.5–15.5)
IMM GRANULOCYTES NFR BLD AUTO: 1.8 % — HIGH (ref 0–1.5)
LYMPHOCYTES # BLD AUTO: 3.37 K/UL — HIGH (ref 1–3.3)
LYMPHOCYTES # BLD AUTO: 31.5 % — SIGNIFICANT CHANGE UP (ref 13–44)
MCHC RBC-ENTMCNC: 30.4 PG — SIGNIFICANT CHANGE UP (ref 27–34)
MCHC RBC-ENTMCNC: 31.5 GM/DL — LOW (ref 32–36)
MCV RBC AUTO: 96.4 FL — SIGNIFICANT CHANGE UP (ref 80–100)
MONOCYTES # BLD AUTO: 0.87 K/UL — SIGNIFICANT CHANGE UP (ref 0–0.9)
MONOCYTES NFR BLD AUTO: 8.1 % — SIGNIFICANT CHANGE UP (ref 2–14)
NEUTROPHILS # BLD AUTO: 5.98 K/UL — SIGNIFICANT CHANGE UP (ref 1.8–7.4)
NEUTROPHILS NFR BLD AUTO: 55.9 % — SIGNIFICANT CHANGE UP (ref 43–77)
NRBC # BLD: 0 /100 WBCS — SIGNIFICANT CHANGE UP (ref 0–0)
PLATELET # BLD AUTO: 316 K/UL — SIGNIFICANT CHANGE UP (ref 150–400)
POTASSIUM SERPL-MCNC: 3.9 MMOL/L — SIGNIFICANT CHANGE UP (ref 3.5–5.3)
POTASSIUM SERPL-SCNC: 3.9 MMOL/L — SIGNIFICANT CHANGE UP (ref 3.5–5.3)
RBC # BLD: 3.03 M/UL — LOW (ref 3.8–5.2)
RBC # FLD: 12.6 % — SIGNIFICANT CHANGE UP (ref 10.3–14.5)
SARS-COV-2 RNA SPEC QL NAA+PROBE: SIGNIFICANT CHANGE UP
SODIUM SERPL-SCNC: 143 MMOL/L — SIGNIFICANT CHANGE UP (ref 135–145)
WBC # BLD: 10.7 K/UL — HIGH (ref 3.8–10.5)
WBC # FLD AUTO: 10.7 K/UL — HIGH (ref 3.8–10.5)

## 2022-01-02 PROCEDURE — 99232 SBSQ HOSP IP/OBS MODERATE 35: CPT | Mod: GC

## 2022-01-02 RX ADMIN — HEPARIN SODIUM 5000 UNIT(S): 5000 INJECTION INTRAVENOUS; SUBCUTANEOUS at 13:10

## 2022-01-02 RX ADMIN — RISPERIDONE 1 MILLIGRAM(S): 4 TABLET ORAL at 18:07

## 2022-01-02 RX ADMIN — ATORVASTATIN CALCIUM 40 MILLIGRAM(S): 80 TABLET, FILM COATED ORAL at 21:16

## 2022-01-02 RX ADMIN — Medication 175 MICROGRAM(S): at 05:41

## 2022-01-02 RX ADMIN — DIVALPROEX SODIUM 500 MILLIGRAM(S): 500 TABLET, DELAYED RELEASE ORAL at 21:17

## 2022-01-02 RX ADMIN — RISPERIDONE 1 MILLIGRAM(S): 4 TABLET ORAL at 05:40

## 2022-01-02 RX ADMIN — Medication 60 MILLIGRAM(S): at 18:07

## 2022-01-02 RX ADMIN — DIVALPROEX SODIUM 250 MILLIGRAM(S): 500 TABLET, DELAYED RELEASE ORAL at 12:57

## 2022-01-02 RX ADMIN — LISINOPRIL 40 MILLIGRAM(S): 2.5 TABLET ORAL at 05:41

## 2022-01-02 RX ADMIN — Medication 81 MILLIGRAM(S): at 12:57

## 2022-01-02 RX ADMIN — Medication 2 MILLIGRAM(S): at 12:57

## 2022-01-02 RX ADMIN — AMLODIPINE BESYLATE 10 MILLIGRAM(S): 2.5 TABLET ORAL at 05:41

## 2022-01-02 RX ADMIN — Medication 250 MILLIGRAM(S): at 18:07

## 2022-01-02 RX ADMIN — Medication 250 MILLIGRAM(S): at 05:41

## 2022-01-02 RX ADMIN — HEPARIN SODIUM 5000 UNIT(S): 5000 INJECTION INTRAVENOUS; SUBCUTANEOUS at 05:41

## 2022-01-02 RX ADMIN — HEPARIN SODIUM 5000 UNIT(S): 5000 INJECTION INTRAVENOUS; SUBCUTANEOUS at 21:17

## 2022-01-02 NOTE — PROGRESS NOTE ADULT - PROBLEM SELECTOR PLAN 2
Pt with + UA, wbc of 13  Urine cultures; NGTD, 1st BCx grew GNRs Psychrobacter species  2nd BCx Coagulase negative Staph  continue ceftriaxone for a total of 7 days. 12/27  Ceftin for a total of 14 days  c/w iv fluids  monitor mental status  on ceftin until 1/3/22 Pt with + UA, wbc of 13 on admission  Urine cultures; NGTD, 1st BCx grew GNRs Psychrobacter species  2nd BCx Coagulase negative Staph  continue ceftriaxone for a total of 7 days. 12/27  Ceftin for a total of 14 days  c/w iv fluids  monitor mental status  on ceftin until 1/3/22

## 2022-01-02 NOTE — PROGRESS NOTE ADULT - PROBLEM SELECTOR PLAN 4
Resolved  pt with cr of 2.69, unknown baseline   likely pre-renal in setting of poor po intake at home as per   repeat Cr 1.05  AYAN resolved, continue to trend Cr  lisinopril for HTN, monitor Cr Resolved  pt with cr of 2.69, unknown baseline   likely pre-renal in setting of poor po intake at home as per   repeat Cr 1.05 and 1.27 today  AYAN resolved, continue to trend Cr  lisinopril for HTN, monitor Cr

## 2022-01-02 NOTE — PROGRESS NOTE ADULT - PROBLEM SELECTOR PLAN 1
pt with ams, progressively worsening for 1 week  has hx of bipolar disorder and hospitalizations due changes in behavior (last one in [place])  CTH negative for acute pathology, no focal neurological findings other than ams, but exam difficult to assess in current status.   likely due to infection (+UA, incontinence) and also worsening of dementia   UCx: NGTD  1st BCx grew GNRs Psychrobacter species  2nd BCx Coagulase negative Staph  Repeat blood cultures NGTD  completed Rocephin 7 days  c/w ceftin, to complete 14 days of abx for bacteremia until 1/3/22  at baseline ; RESOLVED  family is concerned that patient is unable to walk, as baseline used to walk with walker  Consulted Dr. Schneider: MRI Brain

## 2022-01-02 NOTE — PROGRESS NOTE ADULT - SUBJECTIVE AND OBJECTIVE BOX
[facility]    Patient is a 61y old  Female who presents with a chief complaint of AMS (01 Jan 2022 08:18)      SUBJECTIVE / OVERNIGHT EVENTS: No acute events overnight. Patient reports that she is sleepy today. Denies pain, headache, nausea, or other complaints. States she would like some water.    MEDICATIONS  (STANDING):  amLODIPine   Tablet 10 milliGRAM(s) Oral daily  aspirin  chewable 81 milliGRAM(s) Oral daily  atorvastatin 40 milliGRAM(s) Oral at bedtime  benztropine 2 milliGRAM(s) Oral daily  cefuroxime   Tablet 250 milliGRAM(s) Oral every 12 hours  dextrose 5% + sodium chloride 0.45%. 1000 milliLiter(s) (75 mL/Hr) IV Continuous <Continuous>  diVALproex  milliGRAM(s) Oral daily  diVALproex  milliGRAM(s) Oral at bedtime  FLUoxetine 60 milliGRAM(s) Oral daily  heparin   Injectable 5000 Unit(s) SubCutaneous every 8 hours  levothyroxine 175 MICROGram(s) Oral daily  lisinopril 40 milliGRAM(s) Oral daily  metoprolol succinate  milliGRAM(s) Oral daily  risperiDONE   Tablet 1 milliGRAM(s) Oral two times a day    MEDICATIONS  (PRN):      Vital Signs Last 24 Hrs  T(C): 36.7 (01-02-22 @ 13:50)  T(F): 98.1 (01-02-22 @ 13:50), Max: 98.4 (01-01-22 @ 20:49)  HR: 59 (01-02-22 @ 13:50) (53 - 62)  BP: 96/64 (01-02-22 @ 13:50)  BP(mean): --  RR: 18 (01-02-22 @ 13:50) (16 - 18)  SpO2: 98% (01-02-22 @ 13:50) (97% - 98%)  Wt(kg): --    01-01 @ 07:01  -  01-02 @ 07:00  --------------------------------------------------------  IN: 0 mL / OUT: 300 mL / NET: -300 mL      CAPILLARY BLOOD GLUCOSE        I&O's Summary    01 Jan 2022 07:01  -  02 Jan 2022 07:00  --------------------------------------------------------  IN: 0 mL / OUT: 300 mL / NET: -300 mL        PHYSICAL EXAM:  GENERAL: NAD, well-developed  HEAD:  Atraumatic, Normocephalic  EYES: EOMI, PERRLA, conjunctiva and sclera clear  NECK: Supple, No JVD  CHEST/LUNG: Clear to auscultation bilaterally; No wheeze  HEART: Regular rate and rhythm; No murmurs, rubs, or gallops  ABDOMEN: Soft, Nontender, Nondistended; Bowel sounds present  EXTREMITIES:  2+ Peripheral Pulses, No clubbing, cyanosis, or edema  PSYCH: AAOx3  NEUROLOGY: non-focal  SKIN: No rashes or lesions    LABS:                        9.2    10.70 )-----------( 316      ( 02 Jan 2022 06:51 )             29.2     01-02    143  |  111<H>  |  25<H>  ----------------------------<  85  3.9   |  27  |  1.27    Ca    9.0      02 Jan 2022 06:51  Phos  3.8     01-01  Mg     2.1     01-01    TPro  7.0  /  Alb  2.2<L>  /  TBili  0.2  /  DBili  x   /  AST  8<L>  /  ALT  9<L>  /  AlkPhos  66  01-01              RADIOLOGY & ADDITIONAL TESTS:    Imaging Personally Reviewed:    Consultant(s) Notes Reviewed:      Care Discussed with Consultants/Other Providers:    Assessment and Plan: [facility]    Patient is a 61y old  Female who presents with a chief complaint of AMS (01 Jan 2022 08:18)      SUBJECTIVE / OVERNIGHT EVENTS: No acute events overnight. Patient reports that she is sleepy today. Denies pain, headache, nausea, or other complaints. States she would like some water.    MEDICATIONS  (STANDING):  amLODIPine   Tablet 10 milliGRAM(s) Oral daily  aspirin  chewable 81 milliGRAM(s) Oral daily  atorvastatin 40 milliGRAM(s) Oral at bedtime  benztropine 2 milliGRAM(s) Oral daily  cefuroxime   Tablet 250 milliGRAM(s) Oral every 12 hours  dextrose 5% + sodium chloride 0.45%. 1000 milliLiter(s) (75 mL/Hr) IV Continuous <Continuous>  diVALproex  milliGRAM(s) Oral daily  diVALproex  milliGRAM(s) Oral at bedtime  FLUoxetine 60 milliGRAM(s) Oral daily  heparin   Injectable 5000 Unit(s) SubCutaneous every 8 hours  levothyroxine 175 MICROGram(s) Oral daily  lisinopril 40 milliGRAM(s) Oral daily  metoprolol succinate  milliGRAM(s) Oral daily  risperiDONE   Tablet 1 milliGRAM(s) Oral two times a day    MEDICATIONS  (PRN):      Vital Signs Last 24 Hrs  T(C): 36.7 (01-02-22 @ 13:50)  T(F): 98.1 (01-02-22 @ 13:50), Max: 98.4 (01-01-22 @ 20:49)  HR: 59 (01-02-22 @ 13:50) (53 - 62)  BP: 96/64 (01-02-22 @ 13:50)  BP(mean): --  RR: 18 (01-02-22 @ 13:50) (16 - 18)  SpO2: 98% (01-02-22 @ 13:50) (97% - 98%)  Wt(kg): --    01-01 @ 07:01  -  01-02 @ 07:00  --------------------------------------------------------  IN: 0 mL / OUT: 300 mL / NET: -300 mL      CAPILLARY BLOOD GLUCOSE        I&O's Summary    01 Jan 2022 07:01  -  02 Jan 2022 07:00  --------------------------------------------------------  IN: 0 mL / OUT: 300 mL / NET: -300 mL        PHYSICAL EXAMINATION:  GENERAL: NAD,   HEAD:  Atraumatic, Normocephalic, thin  EYES:  conjunctiva and sclera clear  NECK: Supple, No JVD, Normal thyroid  CHEST/LUNG: Clear to auscultation. No rales, rhonchi, wheezing,   HEART: Regular rate and rhythm; No murmurs, rubs, or gallops  ABDOMEN: Soft, Nontender, Nondistended; Bowel sounds present  NERVOUS SYSTEM:  Alert & Oriented X 2,    EXTREMITIES:  2+ Peripheral Pulses, No clubbing, cyanosis, or edema  SKIN: warm dry    LABS:                        9.2    10.70 )-----------( 316      ( 02 Jan 2022 06:51 )             29.2     01-02    143  |  111<H>  |  25<H>  ----------------------------<  85  3.9   |  27  |  1.27    Ca    9.0      02 Jan 2022 06:51  Phos  3.8     01-01  Mg     2.1     01-01    TPro  7.0  /  Alb  2.2<L>  /  TBili  0.2  /  DBili  x   /  AST  8<L>  /  ALT  9<L>  /  AlkPhos  66  01-01          RADIOLOGY & ADDITIONAL TESTS:    Imaging Personally Reviewed:    Consultant(s) Notes Reviewed:  neurology    Care Discussed with Consultants/Other Providers:    Assessment and Plan:

## 2022-01-02 NOTE — PROGRESS NOTE ADULT - PROBLEM SELECTOR PLAN 9
TSH - 0.07  on levothyroxine 200mcg at home  Levothyroxine 175mcg (reduced dose) TSH - 0.07  on levothyroxine 200mcg at home  Levothyroxine 175mcg (reduced dose)  Will need repeat TFTs in 6 weeks

## 2022-01-03 LAB
ANION GAP SERPL CALC-SCNC: 7 MMOL/L — SIGNIFICANT CHANGE UP (ref 5–17)
BASOPHILS # BLD AUTO: 0.06 K/UL — SIGNIFICANT CHANGE UP (ref 0–0.2)
BASOPHILS NFR BLD AUTO: 0.7 % — SIGNIFICANT CHANGE UP (ref 0–2)
BUN SERPL-MCNC: 27 MG/DL — HIGH (ref 7–18)
CALCIUM SERPL-MCNC: 9 MG/DL — SIGNIFICANT CHANGE UP (ref 8.4–10.5)
CHLORIDE SERPL-SCNC: 112 MMOL/L — HIGH (ref 96–108)
CO2 SERPL-SCNC: 24 MMOL/L — SIGNIFICANT CHANGE UP (ref 22–31)
CREAT SERPL-MCNC: 1.32 MG/DL — HIGH (ref 0.5–1.3)
EOSINOPHIL # BLD AUTO: 0.21 K/UL — SIGNIFICANT CHANGE UP (ref 0–0.5)
EOSINOPHIL NFR BLD AUTO: 2.3 % — SIGNIFICANT CHANGE UP (ref 0–6)
GLUCOSE SERPL-MCNC: 87 MG/DL — SIGNIFICANT CHANGE UP (ref 70–99)
HCT VFR BLD CALC: 30.2 % — LOW (ref 34.5–45)
HGB BLD-MCNC: 9.5 G/DL — LOW (ref 11.5–15.5)
IMM GRANULOCYTES NFR BLD AUTO: 1.4 % — SIGNIFICANT CHANGE UP (ref 0–1.5)
LYMPHOCYTES # BLD AUTO: 3.46 K/UL — HIGH (ref 1–3.3)
LYMPHOCYTES # BLD AUTO: 38.4 % — SIGNIFICANT CHANGE UP (ref 13–44)
MCHC RBC-ENTMCNC: 30.4 PG — SIGNIFICANT CHANGE UP (ref 27–34)
MCHC RBC-ENTMCNC: 31.5 GM/DL — LOW (ref 32–36)
MCV RBC AUTO: 96.5 FL — SIGNIFICANT CHANGE UP (ref 80–100)
MISCELLANEOUS TEST NAME: SIGNIFICANT CHANGE UP
MONOCYTES # BLD AUTO: 0.67 K/UL — SIGNIFICANT CHANGE UP (ref 0–0.9)
MONOCYTES NFR BLD AUTO: 7.4 % — SIGNIFICANT CHANGE UP (ref 2–14)
NEUTROPHILS # BLD AUTO: 4.49 K/UL — SIGNIFICANT CHANGE UP (ref 1.8–7.4)
NEUTROPHILS NFR BLD AUTO: 49.8 % — SIGNIFICANT CHANGE UP (ref 43–77)
NRBC # BLD: 0 /100 WBCS — SIGNIFICANT CHANGE UP (ref 0–0)
PLATELET # BLD AUTO: 289 K/UL — SIGNIFICANT CHANGE UP (ref 150–400)
POTASSIUM SERPL-MCNC: 4.2 MMOL/L — SIGNIFICANT CHANGE UP (ref 3.5–5.3)
POTASSIUM SERPL-SCNC: 4.2 MMOL/L — SIGNIFICANT CHANGE UP (ref 3.5–5.3)
RBC # BLD: 3.13 M/UL — LOW (ref 3.8–5.2)
RBC # FLD: 12.6 % — SIGNIFICANT CHANGE UP (ref 10.3–14.5)
SODIUM SERPL-SCNC: 143 MMOL/L — SIGNIFICANT CHANGE UP (ref 135–145)
WBC # BLD: 9.02 K/UL — SIGNIFICANT CHANGE UP (ref 3.8–10.5)
WBC # FLD AUTO: 9.02 K/UL — SIGNIFICANT CHANGE UP (ref 3.8–10.5)

## 2022-01-03 PROCEDURE — 99233 SBSQ HOSP IP/OBS HIGH 50: CPT | Mod: GC

## 2022-01-03 RX ORDER — LISINOPRIL 2.5 MG/1
10 TABLET ORAL DAILY
Refills: 0 | Status: DISCONTINUED | OUTPATIENT
Start: 2022-01-03 | End: 2022-01-05

## 2022-01-03 RX ORDER — SODIUM CHLORIDE 9 MG/ML
1000 INJECTION, SOLUTION INTRAVENOUS
Refills: 0 | Status: DISCONTINUED | OUTPATIENT
Start: 2022-01-03 | End: 2022-01-06

## 2022-01-03 RX ADMIN — LISINOPRIL 40 MILLIGRAM(S): 2.5 TABLET ORAL at 06:00

## 2022-01-03 RX ADMIN — Medication 60 MILLIGRAM(S): at 13:00

## 2022-01-03 RX ADMIN — RISPERIDONE 1 MILLIGRAM(S): 4 TABLET ORAL at 06:00

## 2022-01-03 RX ADMIN — Medication 250 MILLIGRAM(S): at 06:00

## 2022-01-03 RX ADMIN — AMLODIPINE BESYLATE 10 MILLIGRAM(S): 2.5 TABLET ORAL at 06:00

## 2022-01-03 RX ADMIN — HEPARIN SODIUM 5000 UNIT(S): 5000 INJECTION INTRAVENOUS; SUBCUTANEOUS at 06:00

## 2022-01-03 RX ADMIN — DIVALPROEX SODIUM 250 MILLIGRAM(S): 500 TABLET, DELAYED RELEASE ORAL at 13:02

## 2022-01-03 RX ADMIN — ATORVASTATIN CALCIUM 40 MILLIGRAM(S): 80 TABLET, FILM COATED ORAL at 22:17

## 2022-01-03 RX ADMIN — SODIUM CHLORIDE 75 MILLILITER(S): 9 INJECTION, SOLUTION INTRAVENOUS at 13:03

## 2022-01-03 RX ADMIN — RISPERIDONE 1 MILLIGRAM(S): 4 TABLET ORAL at 17:50

## 2022-01-03 RX ADMIN — Medication 250 MILLIGRAM(S): at 17:49

## 2022-01-03 RX ADMIN — DIVALPROEX SODIUM 500 MILLIGRAM(S): 500 TABLET, DELAYED RELEASE ORAL at 22:17

## 2022-01-03 RX ADMIN — Medication 175 MICROGRAM(S): at 06:00

## 2022-01-03 RX ADMIN — Medication 2 MILLIGRAM(S): at 13:02

## 2022-01-03 RX ADMIN — Medication 81 MILLIGRAM(S): at 13:00

## 2022-01-03 RX ADMIN — HEPARIN SODIUM 5000 UNIT(S): 5000 INJECTION INTRAVENOUS; SUBCUTANEOUS at 22:17

## 2022-01-03 RX ADMIN — HEPARIN SODIUM 5000 UNIT(S): 5000 INJECTION INTRAVENOUS; SUBCUTANEOUS at 13:05

## 2022-01-03 NOTE — PROGRESS NOTE ADULT - PROBLEM SELECTOR PLAN 6
pt with hgb of 10.3 > 9.9> 10.2 > 9.3 -->9.5 unknown baseline  MCV 96  iron def anemia with elevated ferritin  B12 WNL  likely combination of SHARON and ACD  no signs of active bleeding

## 2022-01-03 NOTE — PROGRESS NOTE ADULT - PROBLEM SELECTOR PLAN 7
pmh of htn on amlodipine, lisinopril, and toprol  continue amlodipine 10mg QD , toprol XL 100mg QD, lisinopril 40mg pmh of htn on amlodipine, lisinopril, and toprol  continue amlodipine 10mg QD , toprol XL 100mg QD  lisinopril reduced from 40mg to 10 in the setting of AYAN

## 2022-01-03 NOTE — PROGRESS NOTE ADULT - ATTENDING COMMENTS
Patient seen and examined on 1/3/22. Case discussed with NP Madi. Complains of having to take thickened liquids as recommended by speech and swallow. Ms. Canela is a 60 yo F with schizophrenia, dementia who presented with toxic metabolic encephalopathy likely 2/2 Psychrobacter bacteremia. Plan was to transfer to [facility] on 12/30/21 but brother refused transfer because of patient's inability to walk without rolling walker as 3 months ago she could walk with a cane. Spoke to brother who feels patient has declined significantly over the past month. Reports she has worsening anorexia and poor appetite and he is also concerned because a month ago the patient was walking with a cane but was able to visit them independently by coming with a cab. He is concerned that she may have a neurologic condition causing her decline.    #Toxic Metabolic Encephalopathy likely 2/2 Psychrobacter bacteremia, now resolved with treatment of infection  -Seen by neuro who recommend MRI w/wo contrast brain to assess for structural reasons behind patient's AMS and inability to ambulate  -Brother reports mental status has declined significantly over the past month  -If no answer to decline in mentation on MRI, consider contacting patient's psychiatrist, Dr. Guan [phone] to see if medications may be playing a role    #Bacteremia, psychrobacter species  -Completed 7 day course of ceftriaxone  -To complete course of Ceftin tomorrow ( January 4)    #Schizophrenia  -Continue VPA 250mg QDaily and 500mg QHS  -Continue prozac 60mg QDaily  -Continue Risperdal 1mg BID home dose    #AYAN likely pre-renal improved with IVF, resolved  #HTN  #Progressive dementia  #Physical deconditioning  -Will need [facility] on discharge, accepted by [facility], authorization and Level 2 screen completed    #Hypothyroidism on levothyroxine, Ft14 and TSH elevated.   -Decreased levothyroxine to 175mcg from 200mcg, will need repeat TSH check in 6 weeks.     #Ambulatory Dysfunction  -Neuro recommends MRI w/wo contrast brain to assess for structural etiology    #Anorexia  -Calorie count  -Speech and Swallow re-evaluated the patient and now recommend thin liquids  -Consider additional imaging if symptoms persist per brother's request

## 2022-01-03 NOTE — PROGRESS NOTE ADULT - PROBLEM SELECTOR PLAN 1
pt with ams, progressively worsening for 1 week  has hx of bipolar disorder and hospitalizations due changes in behavior (last one in [place])  CTH negative for acute pathology, no focal neurological findings other than ams, but exam difficult to assess in current status.   likely due to infection (+UA, incontinence) and also worsening of dementia   UCx: NGTD  1st BCx grew GNRs most closely resembling Psychrobacter species  2nd BCx Coagulase negative Staph  Repeat blood cultures 12/23 no growth  Completed Rocephin 7 days--->C/w ceftin, to complete 14 days of abx for bacteremia until 1/4/22  family was  concerned that patient is unable to walk, as baseline used to walk with walker; Dr Schneider following;  MRI Brain to be done today  Continue to monitor mental status

## 2022-01-03 NOTE — SWALLOW BEDSIDE ASSESSMENT ADULT - CONSISTENCIES ADMINISTERED
Applesauce, x3 teaspoons/pureed applesauce+rosa cracker, x3 tsp/minced & moist water, 4oz/thin liquid water, 4oz/mildly thick ice chips, x 2 diced pears, x2/soft & bite-sized

## 2022-01-03 NOTE — SWALLOW BEDSIDE ASSESSMENT ADULT - SPECIFY REASON(S)
Pt seen for bedside swallow evaluation to determine optimal PO candidacy.
Subjective re-assessment of current swallow function for possible diet advancement

## 2022-01-03 NOTE — PROGRESS NOTE ADULT - PROBLEM SELECTOR PLAN 9
TSH - 0.07  on levothyroxine 200mcg at home  Levothyroxine 175mcg (reduced dose)  Will need repeat TFTs in 6 weeks

## 2022-01-03 NOTE — SWALLOW BEDSIDE ASSESSMENT ADULT - ASR SWALLOW DENTITION
sparse lower dentition, no upper dentition visualized/incomplete
few lower dentition, no upper dentition visualized/incomplete

## 2022-01-03 NOTE — PROGRESS NOTE ADULT - PROBLEM SELECTOR PLAN 4
admitted  with cr of 2.69, unknown baseline. Likely in the setting of poor po liquid intake;   Scr1.32;   IVF ; LR @ 75cc/hr  f/u S&S eval for possible upgrade in diet with the possibility of drinking thin liquids   f/u BMP in am   Avoid nephrotoxic drugs  on lisinopril for HTN; continue for now as Scr had previously normalized while on. AYAN likely due to poor po intake and is currently mild. admitted  with cr of 2.69, unknown baseline. Likely in the setting of poor po liquid intake;   Scr1.32;   IVF ; LR @ 75cc/hr  f/u S&S eval for possible upgrade in diet with the possibility of drinking thin liquids   f/u BMP in am   Avoid nephrotoxic drugs  on lisinopril for HTN; will reduce dose for now as Scr had previously normalized while on. AYAN likely due to poor po intake and is currently mild.

## 2022-01-03 NOTE — PROGRESS NOTE ADULT - PROBLEM SELECTOR PLAN 3
on admission brother reported that pt has dementia which had worsened 2 weeks prior to admission    continue supportive care  due to physical and mental limitation due to dementia progression pt may need placement  PT recommends [facility]

## 2022-01-03 NOTE — SWALLOW BEDSIDE ASSESSMENT ADULT - COMMENTS
Pt AA+Ox2, partially responsive to queries and simple commands, HOB elevated to 90°.
AA+Ox1, further orientation questions not answered d/t Pt's AMS. Pt received HOB 45°, elevated to 90° by SLP. Pt aroused awake and producing unintelligible remarks, but clearly requested cup of water. Dampened bed sheet visualized, likely from excessive diaphoresis. Pronged tongue shape noted which had no impact on swallow function. Pt exhibited significant difficulty holding cup/utensils and elevating UE to mouth for feeding.

## 2022-01-03 NOTE — PROGRESS NOTE ADULT - PROBLEM SELECTOR PLAN 2
Pt with + UA, wbc of 13 on admission--->9 today   Urine cultures; NGTD, 1st BCx grew GNRs Psychrobacter species  2nd BCx Coagulase negative Staph, possible contaminant  s/p  ceftriaxone for a total of 7 days---> Ceftin x 14 days last dose 1/4/22  c/w iv fluids--> LR @75cc/hr x 24 hrs

## 2022-01-03 NOTE — PROGRESS NOTE ADULT - PROBLEM SELECTOR PLAN 5
hx of bipolar disorder on multiple meds  hx of non compliance, but now workers at the facility she lives in are giving her the medications  continue divalproex 250mg QD 500mg QHS, prozac 60mg QD, risperdal 1mg BID home dose  Tele Psych was previously  consulted: cleared patient for placement

## 2022-01-03 NOTE — SWALLOW BEDSIDE ASSESSMENT ADULT - NS ASR SWALLOW FINDINGS DISCUS
HELIO Kaufman & NP Rusty/Nursing/Patient
pt, brother over the phone, RN Abbe, NP Edith/Nursing/Patient/Family

## 2022-01-03 NOTE — PROGRESS NOTE ADULT - ASSESSMENT
62 y/o F with pmh of Bipolar disorder, HTN, HLD, Hypothyroidism, who presented to the ED BIBEMS due to AMS, found to be bacteremic

## 2022-01-03 NOTE — SWALLOW BEDSIDE ASSESSMENT ADULT - ASR SWALLOW LINGUAL MOBILITY
reduced strength against resistance
timely oral transit but poor bolus hold and formation resulting in oral residue post swallow/impaired anterior elevation

## 2022-01-03 NOTE — SWALLOW BEDSIDE ASSESSMENT ADULT - SWALLOW EVAL: CRITERIA FOR SKILLED INTERVENTION MET
no problems identified which require skilled intervention
no problems identified which require skilled intervention

## 2022-01-03 NOTE — SWALLOW BEDSIDE ASSESSMENT ADULT - SLP PERTINENT HISTORY OF CURRENT PROBLEM
61F with PMHx of Bipolar disorder, HTN, HLD, Hypothyroidism, who reportedly presented to the ED BIBEMS due to AMS. 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 At the time of exam patient was reportedly AO x 0, arousable but lethargic, s/p Haldol and ativan for episode of agitation. Pt is reportedly AO x 2 at baseline, for the past 2 weeks there have been changes in her activity and mental status, becoming more confused, not walking or doing much, having gait. As per Michelle, no Fevers or chills reported, no sick contacts, no complains that patient expressed, other than the noticeable change in mental status. Patient was reportedly admitted at [facility] form 4/19/21 to 5/28/21 due to change in behavior, not able to care for herself and not taking her medications. Since then patient has required more assistance and they have people give her the medications twice a day. She reportedly also hasn't been eating well for the past 2 weeks.
61F with PMHx of Bipolar disorder, HTN, HLD, Hypothyroidism, who reportedly presented to the ED BIBEMS due to AMS. 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 At the time of exam patient was reportedly AO x 0, arousable but lethargic, s/p Haldol and ativan for episode of agitation. Pt is reportedly AO x 2 at baseline, for the past 2 weeks there have been changes in her activity and mental status, becoming more confused, not walking or doing much, having gait. As per Michelle, no Fevers or chills reported, no sick contacts, no complains that patient expressed, other than the noticeable change in mental status. Patient was reportedly admitted at [facility] form 4/19/21 to 5/28/21 due to change in behavior, not able to care for herself and not taking her medications. Since then patient has required more assistance and they have people give her the medications twice a day. She reportedly also hasn't been eating well for the past 2 weeks.

## 2022-01-03 NOTE — SWALLOW BEDSIDE ASSESSMENT ADULT - DIET PRIOR TO ADMI
Per brother over the phone, up to a few weeks ago, pt was eating more solid food. Over the past weeks, pt p/w reduced appetite.
Unable to ascertain d/t poor mentation

## 2022-01-03 NOTE — SWALLOW BEDSIDE ASSESSMENT ADULT - SWALLOW EVAL: RECOMMENDED FEEDING/EATING TECHNIQUES
alternate food with liquid/check mouth frequently for oral residue/pocketing/crush medication (when feasible)/maintain upright posture during/after eating for 30 mins/no straws/oral hygiene/position upright (90 degrees)/small sips/bites
offer large pills with applesauce/alternate food with liquid/maintain upright posture during/after eating for 30 mins/no straws/oral hygiene/position upright (90 degrees)/small sips/bites

## 2022-01-03 NOTE — SWALLOW BEDSIDE ASSESSMENT ADULT - SWALLOW EVAL: DIAGNOSIS
Pt p/w oral dysphagia c/b impaired mastication likely related to missing dentition, impaired bolus formation, and increased oral transit time. No overt s/s aspiration on trials.
Pt p/w mild oropharyngeal dysphagia c/b poor bolus holding and formation, anterior spillage, oral residue 2/2 poor A-P transport; multiple swallows palpated. No overt s&s of penetration/ aspiration observed at time of this exam.

## 2022-01-03 NOTE — PROGRESS NOTE ADULT - SUBJECTIVE AND OBJECTIVE BOX
NP Note discussed with  Primary Attending; Dr Morris    Patient is a 61y old  Female who presents with a chief complaint of AMS (02 Jan 2022 16:11)      INTERVAL HPI/OVERNIGHT EVENTS: Patient seen and examined earlier this am; A&0x1-2, appears comfortable, no overnight events reported by health team.   Discussed plan of care with emergency contact brother Jose who is also requesting reeval for dysphagia as he believes patient may be able to be upgraded.     MEDICATIONS  (STANDING):  amLODIPine   Tablet 10 milliGRAM(s) Oral daily  aspirin  chewable 81 milliGRAM(s) Oral daily  atorvastatin 40 milliGRAM(s) Oral at bedtime  benztropine 2 milliGRAM(s) Oral daily  cefuroxime   Tablet 250 milliGRAM(s) Oral every 12 hours  diVALproex  milliGRAM(s) Oral daily  diVALproex  milliGRAM(s) Oral at bedtime  FLUoxetine 60 milliGRAM(s) Oral daily  heparin   Injectable 5000 Unit(s) SubCutaneous every 8 hours  lactated ringers. 1000 milliLiter(s) (75 mL/Hr) IV Continuous <Continuous>  levothyroxine 175 MICROGram(s) Oral daily  lisinopril 40 milliGRAM(s) Oral daily  metoprolol succinate  milliGRAM(s) Oral daily  risperiDONE   Tablet 1 milliGRAM(s) Oral two times a day    MEDICATIONS  (PRN):      __________________________________________________  REVIEW OF SYSTEMS: limited given mental status however denied discomfort.       Vital Signs Last 24 Hrs  T(C): 36.8 (03 Jan 2022 05:10), Max: 36.8 (03 Jan 2022 05:10)  T(F): 98.2 (03 Jan 2022 05:10), Max: 98.2 (03 Jan 2022 05:10)  HR: 56 (03 Jan 2022 05:10) (56 - 63)  BP: 120/56 (03 Jan 2022 05:10) (96/64 - 120/56)  BP(mean): --  RR: 18 (03 Jan 2022 05:10) (16 - 18)  SpO2: 100% (03 Jan 2022 05:10) (98% - 100%)    ________________________________________________  PHYSICAL EXAM:  GENERAL: NAD  HEENT: Normocephalic; atraumatic   CHEST/LUNG: Breathing nonlabored; Clear to auscultation bilaterally  HEART: +S1 +S2  regular  ABDOMEN: Soft, Nontender, Nondistended; Bowel sounds present  EXTREMITIES: no cyanosis; no edema; no calf tenderness  SKIN: warm and dry; no rash  NERVOUS SYSTEM:  Awake and alert; Oriented  to  person, partially oriented to place.     _________________________________________________  LABS:                        9.5    9.02  )-----------( 289      ( 03 Jan 2022 07:50 )             30.2     01-03    143  |  112<H>  |  27<H>  ----------------------------<  87  4.2   |  24  |  1.32<H>    Ca    9.0      03 Jan 2022 07:50                  RADIOLOGY & ADDITIONAL TESTS:  < from: CT Head No Cont (12.20.21 @ 21:55) >  ACC: [id number] EXAM:  CT BRAIN                          PROCEDURE DATE:  12/20/2021          INTERPRETATION:  PROCEDURE:  CT HEAD  [id number]    INDICATION:  ams.    COMPARISON: No priors for comparison.    TECHNIQUE:  Multiple consecutive axial images of the brain were obtained.   Coronal and sagittal reformats were created from the axial data set.    Iterative Reconstruction and automatic exposure control was used for dose   reduction.    CONTRAST:No intravenous contrast was administered.    FINDINGS:    BRAIN PARENCHYMA:  No acute hemorrhage. No mass effect or herniation.   Gray-white differentiation is maintained. There are periventricular and   subcortical white matter hypodensities, which are nonspecific, but likely   reflect mild microvascular ischemic disease.    VENTRICLES/EXTRA-AXIAL SPACES:  The ventricles and sulci are prominent in   size, compatible with mild generalized parenchymal volume loss. No   extra-axial fluid collections.    EXTRACRANIAL STRUCTURES: Normal bones and soft tissues. The visualized   paranasal sinuses are clear. The mastoid air cells are well aerated.    IMPRESSION:    1.  No acute intracranial disease.    --- End of Report ---    MAURO NUNEZ   This document has been electronically signed. Dec 20 2021 10:16PM        Culture - Blood in AM (12.23.21 @ 18:24)    Specimen Source: .Blood Blood-Peripheral    Culture Results:   No Growth Final    Culture - Blood (12.21.21 @ 04:17)    Gram Stain:   Growth in aerobic bottle: Gram Negative Rods  Growth in anaerobic bottle: Gram Positive Cocci in Clusters    -  Ampicillin/Sulbactam: S <=8/4    -  Cefazolin: S <=4    -  Clindamycin: S 0.5    -  Erythromycin: S <=0.25    -  Gentamicin: S <=1 Should not be used as monotherapy    -  Oxacillin: S <=0.25    -  Penicillin: R >8    -  Rifampin: S <=1 Should not be used as monotherapy    -  Tetra/Doxy: S <=1    -  Trimethoprim/Sulfamethoxazole: S <=0.5/9.5    -  Vancomycin: S 1    Specimen Source: .Blood Blood    Organism: Coag Negative Staphylococcus    Culture Results:   Growth in anaerobic bottle: Coag Negative Staphylococcus  Growth in aerobic bottle: Most closely resembling  Psychrobacter species "Susceptibilities not performed"  **BCID performed. No targets detected.**  ***Blood Panel PCR results on this specimen are available  approximately 3 hours after the Gram stain result.***  Gram stain, PCR, and/or culture results may not always  correspond due to difference in methodologies.  ************************************************************  This PCR assay was performed by multiplex PCR. This  Assay tests for 66 bacterial and resistance gene targets.  Please refer to the [facility] test directory  at [email or web address] for details.    Organism Identification: Coag Negative Staphylococcus    Method Type: RUBÉN    Culture - Blood (12.21.21 @ 04:17)    Gram Stain:   Growth in aerobic bottle: Gram positive cocci in pairs  Growth in anaerobic bottle: Gram positive cocci in pairs    -  Coagulase negative Staphylococcus: Detec    Specimen Source: .Blood Blood    Organism: Blood Culture PCR    Culture Results:   Growth in anaerobic bottle: Staphylococcus pettenkoferi  Growth in aerobic bottle: Coag Negative Staphylococcus Unable to Identify  further  Coag Negative Staphylococcus  Single set isolate, possible contaminant. Contact  Microbiology if susceptibility testing clinically  indicated.  ***Blood Panel PCR results on this specimen are available  approximately 3 hours after the Gram stain result.***  Gram stain, PCR, and/or culture results may not always  correspond due to difference in methodologies.  ************************************************************  This PCR assay was performed by multiplex PCR. This  Assay tests for 66 bacterial and resistance gene targets.  Please refer to the [facility] test directory  at [email or web address] for details.    Organism Identification: Blood Culture PCR    Method Type: PCR

## 2022-01-04 LAB
ALBUMIN SERPL ELPH-MCNC: 2.4 G/DL — LOW (ref 3.5–5)
ALP SERPL-CCNC: 65 U/L — SIGNIFICANT CHANGE UP (ref 40–120)
ALT FLD-CCNC: 8 U/L DA — LOW (ref 10–60)
ANION GAP SERPL CALC-SCNC: 5 MMOL/L — SIGNIFICANT CHANGE UP (ref 5–17)
AST SERPL-CCNC: 7 U/L — LOW (ref 10–40)
BASOPHILS # BLD AUTO: 0.05 K/UL — SIGNIFICANT CHANGE UP (ref 0–0.2)
BASOPHILS NFR BLD AUTO: 0.6 % — SIGNIFICANT CHANGE UP (ref 0–2)
BILIRUB SERPL-MCNC: 0.2 MG/DL — SIGNIFICANT CHANGE UP (ref 0.2–1.2)
BUN SERPL-MCNC: 28 MG/DL — HIGH (ref 7–18)
CALCIUM SERPL-MCNC: 9.2 MG/DL — SIGNIFICANT CHANGE UP (ref 8.4–10.5)
CHLORIDE SERPL-SCNC: 113 MMOL/L — HIGH (ref 96–108)
CO2 SERPL-SCNC: 26 MMOL/L — SIGNIFICANT CHANGE UP (ref 22–31)
CREAT SERPL-MCNC: 1.12 MG/DL — SIGNIFICANT CHANGE UP (ref 0.5–1.3)
EOSINOPHIL # BLD AUTO: 0.22 K/UL — SIGNIFICANT CHANGE UP (ref 0–0.5)
EOSINOPHIL NFR BLD AUTO: 2.4 % — SIGNIFICANT CHANGE UP (ref 0–6)
GLUCOSE SERPL-MCNC: 106 MG/DL — HIGH (ref 70–99)
HCT VFR BLD CALC: 29.3 % — LOW (ref 34.5–45)
HGB BLD-MCNC: 9.5 G/DL — LOW (ref 11.5–15.5)
IMM GRANULOCYTES NFR BLD AUTO: 1.1 % — SIGNIFICANT CHANGE UP (ref 0–1.5)
LYMPHOCYTES # BLD AUTO: 3.33 K/UL — HIGH (ref 1–3.3)
LYMPHOCYTES # BLD AUTO: 36.8 % — SIGNIFICANT CHANGE UP (ref 13–44)
MCHC RBC-ENTMCNC: 30.6 PG — SIGNIFICANT CHANGE UP (ref 27–34)
MCHC RBC-ENTMCNC: 32.4 GM/DL — SIGNIFICANT CHANGE UP (ref 32–36)
MCV RBC AUTO: 94.5 FL — SIGNIFICANT CHANGE UP (ref 80–100)
MONOCYTES # BLD AUTO: 0.85 K/UL — SIGNIFICANT CHANGE UP (ref 0–0.9)
MONOCYTES NFR BLD AUTO: 9.4 % — SIGNIFICANT CHANGE UP (ref 2–14)
NEUTROPHILS # BLD AUTO: 4.49 K/UL — SIGNIFICANT CHANGE UP (ref 1.8–7.4)
NEUTROPHILS NFR BLD AUTO: 49.7 % — SIGNIFICANT CHANGE UP (ref 43–77)
NRBC # BLD: 0 /100 WBCS — SIGNIFICANT CHANGE UP (ref 0–0)
PLATELET # BLD AUTO: 293 K/UL — SIGNIFICANT CHANGE UP (ref 150–400)
POTASSIUM SERPL-MCNC: 4.5 MMOL/L — SIGNIFICANT CHANGE UP (ref 3.5–5.3)
POTASSIUM SERPL-SCNC: 4.5 MMOL/L — SIGNIFICANT CHANGE UP (ref 3.5–5.3)
PROT SERPL-MCNC: 6.6 G/DL — SIGNIFICANT CHANGE UP (ref 6–8.3)
RBC # BLD: 3.1 M/UL — LOW (ref 3.8–5.2)
RBC # FLD: 12.7 % — SIGNIFICANT CHANGE UP (ref 10.3–14.5)
SODIUM SERPL-SCNC: 144 MMOL/L — SIGNIFICANT CHANGE UP (ref 135–145)
WBC # BLD: 9.04 K/UL — SIGNIFICANT CHANGE UP (ref 3.8–10.5)
WBC # FLD AUTO: 9.04 K/UL — SIGNIFICANT CHANGE UP (ref 3.8–10.5)

## 2022-01-04 PROCEDURE — 70553 MRI BRAIN STEM W/O & W/DYE: CPT | Mod: 26

## 2022-01-04 PROCEDURE — 99233 SBSQ HOSP IP/OBS HIGH 50: CPT | Mod: GC

## 2022-01-04 PROCEDURE — 99223 1ST HOSP IP/OBS HIGH 75: CPT

## 2022-01-04 RX ADMIN — HEPARIN SODIUM 5000 UNIT(S): 5000 INJECTION INTRAVENOUS; SUBCUTANEOUS at 21:46

## 2022-01-04 RX ADMIN — SODIUM CHLORIDE 75 MILLILITER(S): 9 INJECTION, SOLUTION INTRAVENOUS at 05:52

## 2022-01-04 RX ADMIN — DIVALPROEX SODIUM 500 MILLIGRAM(S): 500 TABLET, DELAYED RELEASE ORAL at 21:46

## 2022-01-04 RX ADMIN — Medication 250 MILLIGRAM(S): at 05:42

## 2022-01-04 RX ADMIN — ATORVASTATIN CALCIUM 40 MILLIGRAM(S): 80 TABLET, FILM COATED ORAL at 21:46

## 2022-01-04 RX ADMIN — Medication 175 MICROGRAM(S): at 05:42

## 2022-01-04 RX ADMIN — RISPERIDONE 1 MILLIGRAM(S): 4 TABLET ORAL at 17:19

## 2022-01-04 RX ADMIN — HEPARIN SODIUM 5000 UNIT(S): 5000 INJECTION INTRAVENOUS; SUBCUTANEOUS at 05:42

## 2022-01-04 RX ADMIN — Medication 81 MILLIGRAM(S): at 11:29

## 2022-01-04 RX ADMIN — Medication 60 MILLIGRAM(S): at 11:29

## 2022-01-04 RX ADMIN — LISINOPRIL 10 MILLIGRAM(S): 2.5 TABLET ORAL at 05:43

## 2022-01-04 RX ADMIN — Medication 100 MILLIGRAM(S): at 05:42

## 2022-01-04 RX ADMIN — HEPARIN SODIUM 5000 UNIT(S): 5000 INJECTION INTRAVENOUS; SUBCUTANEOUS at 13:10

## 2022-01-04 RX ADMIN — AMLODIPINE BESYLATE 10 MILLIGRAM(S): 2.5 TABLET ORAL at 05:42

## 2022-01-04 RX ADMIN — RISPERIDONE 1 MILLIGRAM(S): 4 TABLET ORAL at 05:42

## 2022-01-04 RX ADMIN — Medication 2 MILLIGRAM(S): at 11:29

## 2022-01-04 RX ADMIN — DIVALPROEX SODIUM 250 MILLIGRAM(S): 500 TABLET, DELAYED RELEASE ORAL at 11:29

## 2022-01-04 NOTE — PROGRESS NOTE ADULT - PROBLEM SELECTOR PLAN 1
pt with ams, progressively worsening for 1 week  has hx of bipolar disorder and hospitalizations due changes in behavior (last one in [place])  CTH negative for acute pathology, no focal neurological findings other than ams, but exam difficult to assess in current status.   likely due to infection (+UA, incontinence) and also worsening of dementia   UCx: NGTD  1st BCx grew GNRs most closely resembling Psychrobacter species  2nd BCx Coagulase negative Staph  Repeat blood cultures 12/23 no growth  Completed Rocephin 7 days--->C/w ceftin, to complete 14 days of abx for bacteremia. Completed  1/4/22  family was  concerned that patient is unable to walk as before, as baseline used to walk with walker; [facility] following.   S/p MRI today result as noted above. Reviewed with Dr Patel; no acute findings. No further inpatient w/u recommended. Patient to f/u with Neurology outpatient.   Continue to monitor mental status pt with ams, progressively worsening for 1 week  has hx of bipolar disorder and hospitalizations due changes in behavior (last one in [place])  CTH negative for acute pathology, no focal neurological findings other than ams, but exam difficult to assess in current status.   likely due to infection (+UA, incontinence) and also worsening of dementia   UCx: NGTD  1st BCx grew GNRs most closely resembling Psychrobacter species  2nd BCx Coagulase negative Staph  Repeat blood cultures 12/23 no growth  Completed Rocephin 7 days--->C/w ceftin, to complete 14 days of abx for bacteremia. Completed  1/4/22  family was  concerned that patient is unable to walk as before, as baseline used to walk with walker; [facility] following.   S/p MRI today result as noted above. Reviewed with Dr Patel; no acute findings/ structural abnormalities No further inpatient w/u recommended. Patient to f/u with Neurology outpatient.   Continue to monitor mental status

## 2022-01-04 NOTE — PROGRESS NOTE ADULT - PROBLEM SELECTOR PLAN 4
admitted  with cr of 2.69, unknown baseline. Likely in the setting of poor po liquid intake;   s/p IVF ; LR @ 75cc/hr x 24hrs, lisinopril dose reduced to 10mg daily  --> AYAN now resolved   Continue to monitor BMP  Avoid nephrotoxic drugs

## 2022-01-04 NOTE — PROGRESS NOTE ADULT - PROBLEM SELECTOR PLAN 9
TSH - 0.07  on levothyroxine 200mcg at home  Levothyroxine 175mcg (reduced dose)  Will need repeat TFTs in 6 weeks  - will f/u Thyroid panel in am per brother's request . Discussed w/ Attg

## 2022-01-04 NOTE — PROGRESS NOTE ADULT - PROBLEM SELECTOR PLAN 7
pmh of htn on amlodipine, lisinopril, and toprol  continue amlodipine 10mg QD , toprol XL 100mg QD  lisinopril reduced from 40mg to 10 in the setting of AYAN

## 2022-01-04 NOTE — PROGRESS NOTE ADULT - SUBJECTIVE AND OBJECTIVE BOX
NP Note discussed with  Primary Attending; Dr Hein     Patient is a 61y old  Female who presents with a chief complaint of AMS (04 Jan 2022 17:19)    INTERVAL HPI/OVERNIGHT EVENTS: Patient seen and examined earlier today;  no new complaints.  Discussed with brother Jose today; concerned patient not eating enough; explained Calorie count in progress and patient seems to have foods that she prefers to eat. Ensure supplement increased to TID as patient stated to team that she enjoys it.     MEDICATIONS  (STANDING):  amLODIPine   Tablet 10 milliGRAM(s) Oral daily  aspirin  chewable 81 milliGRAM(s) Oral daily  atorvastatin 40 milliGRAM(s) Oral at bedtime  benztropine 2 milliGRAM(s) Oral daily  diVALproex  milliGRAM(s) Oral daily  diVALproex  milliGRAM(s) Oral at bedtime  FLUoxetine 60 milliGRAM(s) Oral daily  heparin   Injectable 5000 Unit(s) SubCutaneous every 8 hours  lactated ringers. 1000 milliLiter(s) (75 mL/Hr) IV Continuous <Continuous>  levothyroxine 175 MICROGram(s) Oral daily  lisinopril 10 milliGRAM(s) Oral daily  metoprolol succinate  milliGRAM(s) Oral daily  risperiDONE   Tablet 1 milliGRAM(s) Oral two times a day    MEDICATIONS  (PRN):      __________________________________________________  REVIEW OF SYSTEMS: limited given patient's mental status ( A&ox1-2). However denied discomfort. Was at first declining MRI however after explanation of necessity and that brother informed and agreed, patient agreed to go for test         Vital Signs Last 24 Hrs  T(C): 36.8 (04 Jan 2022 13:49), Max: 36.8 (03 Jan 2022 22:08)  T(F): 98.3 (04 Jan 2022 13:49), Max: 98.3 (04 Jan 2022 13:49)  HR: 73 (04 Jan 2022 13:49) (68 - 80)  BP: 139/83 (04 Jan 2022 13:49) (124/70 - 139/83)  BP(mean): 83 (04 Jan 2022 05:22) (83 - 83)  RR: 18 (04 Jan 2022 13:49) (18 - 18)  SpO2: 100% (04 Jan 2022 13:49) (100% - 100%)    ________________________________________________  PHYSICAL EXAM:  GENERAL: NAD  HEENT: Normocephalic; atraumatic   CHEST/LUNG: Breathing nonlabored; Clear to auscultation bilaterally   HEART: +S1 +S2  regular; no murmurs, gallops or rubs  ABDOMEN: Soft, Nontender, Nondistended; Bowel sounds present  EXTREMITIES: VEGAS no cyanosis; no edema; no calf tenderness  SKIN: warm and dry; no rash  NERVOUS SYSTEM:  Awake and alert; Oriented  to person , partially oriented to place ; no focal new deficits    _________________________________________________  LABS:                        9.5    9.04  )-----------( 293      ( 04 Jan 2022 08:05 )             29.3     01-04    144  |  113<H>  |  28<H>  ----------------------------<  106<H>  4.5   |  26  |  1.12    Ca    9.2      04 Jan 2022 08:05    TPro  6.6  /  Alb  2.4<L>  /  TBili  0.2  /  DBili  x   /  AST  7<L>  /  ALT  8<L>  /  AlkPhos  65  01-04        CAPILLARY BLOOD GLUCOSE            RADIOLOGY & ADDITIONAL TESTS:    < from: MR Head w/wo IV Cont (01.04.22 @ 14:34) >    ACC: [id number] EXAM:  MR BRAIN WAW IC                          *** ADDENDUM***    6.5 cc of Gadavist was intravenously administered for this examination.   On 0.0 cc were discarded.    --- End of Report ---    *** END OF ADDENDUM***      PROCEDURE DATE:  01/04/2022          INTERPRETATION:  Contrast-enhanced MRI of the brain.    CLINICAL INDICATION: Altered mental status    TECHNIQUE:  Multiplanar, multisequence MR images of the brain were   obtained before and after the intravenous administration of Gadavist.    COMPARISON: CT brain 12/20/2021    FINDINGS:    No hydrocephalus, midline shift, mass effect, vasogenic edema, or acute   intracranial hemorrhage.  Diffusion weighted images demonstrate no acute   territorial infarct.    Several nonspecific nonenhancing foci of T2 and FLAIR hyperintense signal   in the cerebral white matter.    Flow voids are seen within the major intracranial vessels consistent with   their patency.    No abnormal parenchymal or leptomeningeal enhancement.    Visualized paranasal sinuses and mastoid air cells are clear.    Orbits, sellar and suprasellar structures, and craniocervical junction   are unremarkable.    IMPRESSION:    No hydrocephalus, mass effect, acute intracranial hemorrhage, vasogenic   edema, or acute territorial infarct.    Several nonspecific nonenhancing foci of T2 and FLAIR hyperintense signal   in the cerebral white matter. Statistically, mild white matter   microvascular ischemic disease is favored. Differential diagnosis   includes demyelinating, infectious, inflammatory, and post   infectious/inflammatory processes.    No abnormal parenchymal or leptomeningeal enhancement.    --- End of Report ---    ***Please see the addendum at the top of this report. It may contain   additional important information or changes.****        APRIL CORDOVA MD; Attending Radiologist  This document has been electronically signed. Jan 4 2022  2:19PM  Addend:APRIL CORDOVA MD; Attending Radiologist  This addendum was electronically signed on: Jan 4 2022  3:05PM.    < end of copied text >

## 2022-01-04 NOTE — PROGRESS NOTE ADULT - ATTENDING COMMENTS
Patient seen and examined on 1/4/2022. Patient denies any complaints, says that she likes ensure drinks and requesting for more. Per calorie count tracking patient is now taking 50-75% of most meals. On physical exam, patient is no acute distress, conversant and communicating appropriately AAOx2 which is patients baseline, lungs CTAB, heart RRR, abd soft nontender, ext no c/c/e.    MRI brain done and was negative for any acute pathology. Neurology Dr. Patel evaluated patient, no further workup warranted at this time.   Patient has completed treatment for her bacteremia and her UTI with antibiotics as such medically stable for discharge to rehab where she can get optimized for her deconditioning.     Called patient's brother Jose Canela to give update regarding patient. Spent 45 minutes discussing patient's overall care since hospitalization and that she is now stable for discharge. Mr. Canela is refusing discharge at this time and states that patient has not been medically optimized from nutrition stand point and is demanding anorexia workup. Explained that patient's BMI is 25 and based on calorie counts has been tolerating 50-75% of meals. Mr Canela requesting that patient be seen by GI for workup of her decreased PO intake as patient is not able to advocate for herself if she goes to nursing home. Will consult GI Dr. Varela to see if any further imaging is warranted at this time.    Mr. Canela also expressed concerns that patient has been sleeping all day and wants her psych meds to be reevaluated for adjustment. Explained that Ms Canela has stable on her psych medications since admission, was evaluated by telepsych a week prior with no changes currently. He is concerned that patient has been more drowsy the last week. Explained that patient has had a prolonged hospitalization and delirium in the setting of recent infection bacteremia and UTI is likely contributing to patients sleep-wake cycle, it will take for her mental status to come to a baseline. However, he insisted patient is optimized from psych perspective prior to discharge.     Will re-order thyroid studies and consult psych Dr. Sofia.     Mr. Canela also requested for neurologist Dr. Patel to call him to discuss MRI findings in detail. States that he is a radiologist and needs to review MRI findings with her.  Message sent to Dr. Patel.             < from: MR Head w/wo IV Cont (01.04.22 @ 14:34) >    No hydrocephalus, mass effect, acute intracranial hemorrhage, vasogenic   edema, or acute territorial infarct.    Several nonspecific nonenhancing foci of T2 and FLAIR hyperintense signal   in the cerebral white matter. Statistically, mild white matter   microvascular ischemic disease is favored. Differential diagnosis   includes demyelinating, infectious, inflammatory, and post   infectious/inflammatory processes.    No abnormal parenchymal or leptomeningeal enhancement.    < end of copied text >        Vital Signs Last 24 Hrs  T(C): 36.7 (05 Jan 2022 05:26), Max: 36.8 (04 Jan 2022 13:49)  T(F): 98.1 (05 Jan 2022 05:26), Max: 98.3 (04 Jan 2022 13:49)  HR: 61 (05 Jan 2022 05:26) (58 - 73)  BP: 142/73 (05 Jan 2022 05:26) (128/66 - 142/73)  BP(mean): --  RR: 18 (05 Jan 2022 05:26) (18 - 18)  SpO2: 97% (05 Jan 2022 05:26) (97% - 100%)

## 2022-01-04 NOTE — PROGRESS NOTE ADULT - SUBJECTIVE AND OBJECTIVE BOX
Neurology Follow up note    Name  LUIS WU    Subjective:  patient had MRI brain     Review of Systems:  Constitutional:   no fever     Respiratory:  no cough                            MEDICATIONS  (STANDING):  amLODIPine   Tablet 10 milliGRAM(s) Oral daily  aspirin  chewable 81 milliGRAM(s) Oral daily  atorvastatin 40 milliGRAM(s) Oral at bedtime  benztropine 2 milliGRAM(s) Oral daily  diVALproex  milliGRAM(s) Oral daily  diVALproex  milliGRAM(s) Oral at bedtime  FLUoxetine 60 milliGRAM(s) Oral daily  heparin   Injectable 5000 Unit(s) SubCutaneous every 8 hours  lactated ringers. 1000 milliLiter(s) (75 mL/Hr) IV Continuous <Continuous>  levothyroxine 175 MICROGram(s) Oral daily  lisinopril 10 milliGRAM(s) Oral daily  metoprolol succinate  milliGRAM(s) Oral daily  risperiDONE   Tablet 1 milliGRAM(s) Oral two times a day    MEDICATIONS  (PRN):      Allergies    No Known Allergies    Intolerances        Objective:   Vital Signs Last 24 Hrs  T(C): 36.8 (04 Jan 2022 13:49), Max: 36.8 (03 Jan 2022 17:29)  T(F): 98.3 (04 Jan 2022 13:49), Max: 98.3 (04 Jan 2022 13:49)  HR: 73 (04 Jan 2022 13:49) (68 - 84)  BP: 139/83 (04 Jan 2022 13:49) (116/58 - 139/83)  BP(mean): 83 (04 Jan 2022 05:22) (83 - 83)  RR: 18 (04 Jan 2022 13:49) (18 - 18)  SpO2: 100% (04 Jan 2022 13:49) (100% - 100%)    General Exam:   General appearance: No acute distress                 Cardiovascular: Pedal dorsalis pulses intact bilaterally    Neurological Exam:  Mental Status: Orientated to self, date and place.  Attention intact.  No dysarthria, aphasia or neglect.     Cranial Nerves: CN I - not tested.  PERRL, EOMI, VFF, no nystagmus or diplopia.  No APD.  Fundi not visualized bilaterally.  CN V1-3 intact to light touch.  No facial asymmetry.      Sensation: intact to light touch    01-04    144  |  113<H>  |  28<H>  ----------------------------<  106<H>  4.5   |  26  |  1.12    Ca    9.2      04 Jan 2022 08:05    TPro  6.6  /  Alb  2.4<L>  /  TBili  0.2  /  DBili  x   /  AST  7<L>  /  ALT  8<L>  /  AlkPhos  65  01-04 01-04    144  |  113<H>  |  28<H>  ----------------------------<  106<H>  4.5   |  26  |  1.12    Ca    9.2      04 Jan 2022 08:05    TPro  6.6  /  Alb  2.4<L>  /  TBili  0.2  /  DBili  x   /  AST  7<L>  /  ALT  8<L>  /  AlkPhos  65  01-04    LIVER FUNCTIONS - ( 04 Jan 2022 08:05 )  Alb: 2.4 g/dL / Pro: 6.6 g/dL / ALK PHOS: 65 U/L / ALT: 8 U/L DA / AST: 7 U/L / GGT: x             Radiology    < from: MR Head w/wo IV Cont (01.04.22 @ 14:34) >  ACC: [id number] EXAM:  MR BRAIN WAW IC                          *** ADDENDUM***    6.5 cc of Gadavist was intravenously administered for this examination.   On 0.0 cc were discarded.    --- End of Report ---    *** END OF ADDENDUM***      PROCEDURE DATE:  01/04/2022          INTERPRETATION:  Contrast-enhanced MRI of the brain.    CLINICAL INDICATION: Altered mental status    TECHNIQUE:  Multiplanar, multisequence MR images of the brain were   obtained before and after the intravenous administration of Gadavist.    COMPARISON: CT brain 12/20/2021    FINDINGS:    No hydrocephalus, midline shift, mass effect, vasogenic edema, or acute   intracranial hemorrhage.  Diffusion weighted images demonstrate no acute   territorial infarct.    Several nonspecific nonenhancing foci of T2 and FLAIR hyperintense signal   in the cerebral white matter.    Flow voids are seen within the major intracranial vessels consistent with   their patency.    No abnormal parenchymal or leptomeningeal enhancement.    Visualized paranasal sinuses and mastoid air cells are clear.    Orbits, sellar and suprasellar structures, and craniocervical junction   are unremarkable.    IMPRESSION:    No hydrocephalus, mass effect, acute intracranial hemorrhage, vasogenic   edema, or acute territorial infarct.    Several nonspecific nonenhancing foci of T2 and FLAIR hyperintense signal   in the cerebral white matter. Statistically, mild white matter   microvascular ischemic disease is favored. Differential diagnosis   includes demyelinating, infectious, inflammatory, and post   infectious/inflammatory processes.    No abnormal parenchymal or leptomeningeal enhancement.    --- End of Report ---    ***Please see the addendum at the top of this report. It may contain   additional important information or changes.****        APRIL CORDOVA MD; Attending Radiologist  This document has been electronically signed. Jan 4 2022  2:19PM  Addend:APRIL CORDOVA MD; Attending Radiologist  This addendum was electronically signed on: Jan 4 2022  3:05PM.    < end of copied text >

## 2022-01-04 NOTE — PROGRESS NOTE ADULT - PROBLEM SELECTOR PLAN 2
Pt with + UA, wbc of 13 on admission--->9 today   Urine cultures; NGTD, 1st BCx grew GNRs Psychrobacter species  2nd BCx Coagulase negative Staph, possible contaminant  s/p  ceftriaxone for a total of 7 days---> Ceftin x 14 days last dose 1/4/22

## 2022-01-04 NOTE — PROGRESS NOTE ADULT - ASSESSMENT
Encephalopathy, improved, in patient who has baseline dementia and pw bacteremia and UTI cw toxic encephalopathy.  MRI brain unremarkable for structural abnormalities.  There is no need for further inpatient neurological work up at this time.  Will recommend to cw supportive care and PT.    outpatient neuro fu.  pls call back with questions,    dw NP

## 2022-01-04 NOTE — PROGRESS NOTE ADULT - ASSESSMENT
60 y/o F with pmh of Bipolar disorder, HTN, HLD, Hypothyroidism, who presented to the ED BIBEMS due to AMS, found to be bacteremic

## 2022-01-04 NOTE — PROGRESS NOTE ADULT - PROBLEM SELECTOR PLAN 5
hx of bipolar disorder on multiple meds  hx of non compliance, but now workers at the facility she lives in are giving her the medications  continue divalproex 250mg QD 500mg QHS, prozac 60mg QD, risperdal 1mg BID home dose  Tele Psych was previously  consulted: cleared patient for placement  Brother concerned patient with poor po intake initially on dysphagia diet; S&S reconsulted and diet advanced, Calorie count initiated 1/3, dietitian consulted 1/3 Ensure BID added--> increased to TID today   Psych to be reconsulted in am hx of bipolar disorder on multiple meds  hx of non compliance, but now workers at the facility she lives in are giving her the medications  continue divalproex 250mg QD 500mg QHS, prozac 60mg QD, risperdal 1mg BID home dose  Tele Psych was previously  consulted: cleared patient for placement  Brother concerned patient with poor po intake initially on dysphagia diet; S&S reconsulted and diet advanced, Calorie count initiated 1/3, dietitian consulted 1/3 Ensure BID added--> increased to TID today   As d/w Attg Psych to be reconsulted and GI to be consulted in am  per Brother's request ( concerned patient is anorexic )

## 2022-01-05 LAB
ALBUMIN SERPL ELPH-MCNC: 2.4 G/DL — LOW (ref 3.5–5)
ALP SERPL-CCNC: 69 U/L — SIGNIFICANT CHANGE UP (ref 40–120)
ALT FLD-CCNC: 9 U/L DA — LOW (ref 10–60)
ANION GAP SERPL CALC-SCNC: 5 MMOL/L — SIGNIFICANT CHANGE UP (ref 5–17)
AST SERPL-CCNC: 5 U/L — LOW (ref 10–40)
BILIRUB SERPL-MCNC: 0.2 MG/DL — SIGNIFICANT CHANGE UP (ref 0.2–1.2)
BUN SERPL-MCNC: 23 MG/DL — HIGH (ref 7–18)
CALCIUM SERPL-MCNC: 10.1 MG/DL — SIGNIFICANT CHANGE UP (ref 8.4–10.5)
CHLORIDE SERPL-SCNC: 111 MMOL/L — HIGH (ref 96–108)
CO2 SERPL-SCNC: 29 MMOL/L — SIGNIFICANT CHANGE UP (ref 22–31)
CREAT SERPL-MCNC: 0.99 MG/DL — SIGNIFICANT CHANGE UP (ref 0.5–1.3)
CULTURE RESULTS: SIGNIFICANT CHANGE UP
GLUCOSE SERPL-MCNC: 93 MG/DL — SIGNIFICANT CHANGE UP (ref 70–99)
HCT VFR BLD CALC: 32.8 % — LOW (ref 34.5–45)
HGB BLD-MCNC: 10.4 G/DL — LOW (ref 11.5–15.5)
MCHC RBC-ENTMCNC: 30 PG — SIGNIFICANT CHANGE UP (ref 27–34)
MCHC RBC-ENTMCNC: 31.7 GM/DL — LOW (ref 32–36)
MCV RBC AUTO: 94.5 FL — SIGNIFICANT CHANGE UP (ref 80–100)
NRBC # BLD: 0 /100 WBCS — SIGNIFICANT CHANGE UP (ref 0–0)
PLATELET # BLD AUTO: 300 K/UL — SIGNIFICANT CHANGE UP (ref 150–400)
POTASSIUM SERPL-MCNC: 4.3 MMOL/L — SIGNIFICANT CHANGE UP (ref 3.5–5.3)
POTASSIUM SERPL-SCNC: 4.3 MMOL/L — SIGNIFICANT CHANGE UP (ref 3.5–5.3)
PROT SERPL-MCNC: 7.3 G/DL — SIGNIFICANT CHANGE UP (ref 6–8.3)
RBC # BLD: 3.47 M/UL — LOW (ref 3.8–5.2)
RBC # FLD: 12.8 % — SIGNIFICANT CHANGE UP (ref 10.3–14.5)
SARS-COV-2 RNA SPEC QL NAA+PROBE: SIGNIFICANT CHANGE UP
SODIUM SERPL-SCNC: 145 MMOL/L — SIGNIFICANT CHANGE UP (ref 135–145)
T3 SERPL-MCNC: 89 NG/DL — SIGNIFICANT CHANGE UP (ref 80–200)
T4 AB SER-ACNC: 16.7 UG/DL — HIGH (ref 4.6–12)
TSH SERPL-MCNC: 0.04 UU/ML — LOW (ref 0.34–4.82)
WBC # BLD: 8.16 K/UL — SIGNIFICANT CHANGE UP (ref 3.8–10.5)
WBC # FLD AUTO: 8.16 K/UL — SIGNIFICANT CHANGE UP (ref 3.8–10.5)

## 2022-01-05 PROCEDURE — 99233 SBSQ HOSP IP/OBS HIGH 50: CPT | Mod: GC

## 2022-01-05 PROCEDURE — 99222 1ST HOSP IP/OBS MODERATE 55: CPT

## 2022-01-05 PROCEDURE — 99232 SBSQ HOSP IP/OBS MODERATE 35: CPT

## 2022-01-05 RX ORDER — SODIUM CHLORIDE 9 MG/ML
500 INJECTION INTRAMUSCULAR; INTRAVENOUS; SUBCUTANEOUS ONCE
Refills: 0 | Status: COMPLETED | OUTPATIENT
Start: 2022-01-05 | End: 2022-01-05

## 2022-01-05 RX ORDER — ARIPIPRAZOLE 15 MG/1
2 TABLET ORAL DAILY
Refills: 0 | Status: DISCONTINUED | OUTPATIENT
Start: 2022-01-05 | End: 2022-01-06

## 2022-01-05 RX ORDER — SODIUM CHLORIDE 9 MG/ML
1000 INJECTION, SOLUTION INTRAVENOUS
Refills: 0 | Status: DISCONTINUED | OUTPATIENT
Start: 2022-01-05 | End: 2022-01-06

## 2022-01-05 RX ADMIN — DIVALPROEX SODIUM 250 MILLIGRAM(S): 500 TABLET, DELAYED RELEASE ORAL at 11:27

## 2022-01-05 RX ADMIN — Medication 100 MILLIGRAM(S): at 05:55

## 2022-01-05 RX ADMIN — HEPARIN SODIUM 5000 UNIT(S): 5000 INJECTION INTRAVENOUS; SUBCUTANEOUS at 05:55

## 2022-01-05 RX ADMIN — LISINOPRIL 10 MILLIGRAM(S): 2.5 TABLET ORAL at 05:55

## 2022-01-05 RX ADMIN — RISPERIDONE 1 MILLIGRAM(S): 4 TABLET ORAL at 05:55

## 2022-01-05 RX ADMIN — Medication 60 MILLIGRAM(S): at 11:27

## 2022-01-05 RX ADMIN — AMLODIPINE BESYLATE 10 MILLIGRAM(S): 2.5 TABLET ORAL at 05:55

## 2022-01-05 RX ADMIN — DIVALPROEX SODIUM 500 MILLIGRAM(S): 500 TABLET, DELAYED RELEASE ORAL at 22:04

## 2022-01-05 RX ADMIN — HEPARIN SODIUM 5000 UNIT(S): 5000 INJECTION INTRAVENOUS; SUBCUTANEOUS at 22:06

## 2022-01-05 RX ADMIN — Medication 2 MILLIGRAM(S): at 11:27

## 2022-01-05 RX ADMIN — Medication 81 MILLIGRAM(S): at 11:27

## 2022-01-05 RX ADMIN — SODIUM CHLORIDE 500 MILLILITER(S): 9 INJECTION INTRAMUSCULAR; INTRAVENOUS; SUBCUTANEOUS at 15:08

## 2022-01-05 RX ADMIN — Medication 175 MICROGRAM(S): at 05:55

## 2022-01-05 RX ADMIN — ATORVASTATIN CALCIUM 40 MILLIGRAM(S): 80 TABLET, FILM COATED ORAL at 22:06

## 2022-01-05 RX ADMIN — HEPARIN SODIUM 5000 UNIT(S): 5000 INJECTION INTRAVENOUS; SUBCUTANEOUS at 13:18

## 2022-01-05 NOTE — BH CONSULTATION LIAISON PROGRESS NOTE - NSBHFUPINTERVALHXFT_PSY_A_CORE
Asked to f/u initial telepsych eval on 12/27 due to strong concerns expressed by pt's brother that pt's psych medications (which have not been changed in months) may be causing lethargy and poor appetite and need adjustment. Pt seen in her room on 3S for interview, found sitting up in bed awake and alert, having just completed a PT session. Per PT, pt was cooperative with session and expressed interest in going to [facility] to help recover her strength and mobility. When asked her mood, pt says, "Disappointed--I'm not doing too well," explaining that she feels weak and her arms and legs are unable to move as she would like them to. Pt asks for water, but when MD hands her the cup of water from her tray table, pt immediately spills it on herself and the floor, triggering distress. MD finds PCA to get pt into dry clothes and brings pt a new cup of water (with a straw) and a cup of cranberry juice, which she drinks immediately with obvious enjoyment. During encounter, pt's lunch tray is delivered, at which she says, "Is there any Ensure? I love Ensure." MD opens bottle of strawberry Ensure and places straw in it so pt can drink. Pt says she has been living in her current residence for 20 years and attends the [email or web address] next door to the residence; she says her old psychiatrist retired a few months ago, and her medications are now being managed by an NP named Kimi Machado who works at the clinic. Pt reports NP has made changes in her medications, but she is unable to state exactly what they were. Pt also reports she has been having memory problems for "a long time."     After encounter, MD calls pt's brother to discuss her care with pt's permission. Brother is adamant that pt's functioning has declined since her old psychiatrist retired in 3/2021, and he believes pt could be depressed. Brother also reports that pt has been very sleepy every time he has called her in the hospital, and he has been told by staff that she is not eating much. Brother believes that changes in pt's meds by new provider could be to blame, but he is also unable to specify exactly what the changes were (if any) because he does not have a record of pt's old medication regimen. MD reports having called pt's NP today and leaving a message with a clinic staff member, but no return call has been received. MD recommends that if brother is concerned pt's medications could be making her sleepy, one change that could be made is DC'ing Risperdal and substituting low-dose Abilify, which is less sedating. Brother agrees to the trial. Asked to f/u initial telepsych eval on 12/27 due to strong concerns expressed by pt's brother that pt's psych medications (which have not been changed in months) may be causing lethargy and poor appetite and need adjustment. Pt seen in her room on 3S for interview, found sitting up in bed awake and alert, having just completed a PT session. Per PT, pt was cooperative with session and expressed interest in going to [facility] to help recover her strength and mobility. When asked her mood, pt says, "Disappointed--I'm not doing too well," explaining that she feels weak and her arms and legs are unable to move as she would like them to. Pt asks for water, but when MD hands her the cup of water from her tray table, pt immediately spills it on herself and the floor, triggering distress. MD finds PCA to get pt into dry clothes and brings pt a new cup of water (with a straw) and a cup of cranberry juice, which she drinks immediately with obvious enjoyment. During encounter, pt's lunch tray is delivered, at which she says, "Is there any Ensure? I love Ensure." MD opens bottle of strawberry Ensure and places straw in it so pt can drink. Pt says she has been living in her current residence for 20 years and attends the [facility] next door to the residence; she says her old psychiatrist Dr. Cuevas retired a few months ago, and her medications are now being managed by an NP named Kimi Machado who works at the clinic. Pt reports NP has made changes in her medications, but she is unable to state exactly what they were. Pt also reports she has been having memory problems for "a long time."     After encounter, MD calls pt's brother to discuss her care with pt's permission. Brother is adamant that pt's functioning has declined since her old psychiatrist retired in 3/2021, and he believes pt could be depressed. Brother also reports that pt has been very sleepy every time he has called her in the hospital, and he has been told by staff that she is not eating much. Brother believes that changes in pt's meds by new provider could be to blame, but he is also unable to specify exactly what the changes were (if any) because he does not have a record of pt's old medication regimen. MD reports having called pt's NP today and leaving a message with a clinic staff member, but no return call has been received. MD recommends that if brother is concerned pt's medications could be making her sleepy, one change that could be made is DC'ing Risperdal and substituting low-dose Abilify, which is less sedating. Brother agrees to the trial.

## 2022-01-05 NOTE — PROGRESS NOTE ADULT - PROBLEM SELECTOR PLAN 7
pmh of htn on amlodipine, lisinopril, and toprol  lisinopril reduced from 40mg to 10 in the setting of AYAN  Hold amlodipine 10mg QD , toprol XL 100mg QD, lisinopril for now in setting of hypotension  IV hydration started

## 2022-01-05 NOTE — PROGRESS NOTE ADULT - ATTENDING SUPERVISION STATEMENT
ACP
ACP
Resident
ACP
ACP
Resident
ACP

## 2022-01-05 NOTE — PROGRESS NOTE ADULT - SUBJECTIVE AND OBJECTIVE BOX
NP Note discussed with Primary Attending    Patient is a 61y old  Female who presents with a chief complaint of AMS (05 Jan 2022 09:40AM)      INTERVAL HPI/OVERNIGHT EVENTS: Denies acute complaints at time of eval.      MEDICATIONS  (STANDING):  amLODIPine   Tablet 10 milliGRAM(s) Oral daily  aspirin  chewable 81 milliGRAM(s) Oral daily  atorvastatin 40 milliGRAM(s) Oral at bedtime  benztropine 2 milliGRAM(s) Oral daily  diVALproex  milliGRAM(s) Oral daily  diVALproex  milliGRAM(s) Oral at bedtime  FLUoxetine 60 milliGRAM(s) Oral daily  heparin   Injectable 5000 Unit(s) SubCutaneous every 8 hours  lactated ringers. 1000 milliLiter(s) (75 mL/Hr) IV Continuous <Continuous>  levothyroxine 175 MICROGram(s) Oral daily  lisinopril 10 milliGRAM(s) Oral daily  metoprolol succinate  milliGRAM(s) Oral daily  risperiDONE   Tablet 1 milliGRAM(s) Oral two times a day    MEDICATIONS  (PRN):      __________________________________________________  REVIEW OF SYSTEMS:    CONSTITUTIONAL: No fever,   EYES: no acute visual disturbances  NECK: No pain or stiffness  RESPIRATORY: No cough; No shortness of breath  CARDIOVASCULAR: No chest pain, no palpitations  GASTROINTESTINAL: No pain. No nausea or vomiting; No diarrhea   NEUROLOGICAL: No headache or numbness, no tremors  MUSCULOSKELETAL: No joint pain, no muscle pain  GENITOURINARY: no dysuria, no frequency, no hesitancy  PSYCHIATRY: no depression , no anxiety  ALL OTHER  ROS negative        Vital Signs Last 24 Hrs  T(C): 36.8 (05 Jan 2022 13:40), Max: 36.8 (05 Jan 2022 13:40)  T(F): 98.2 (05 Jan 2022 13:40), Max: 98.2 (05 Jan 2022 13:40)  HR: 77 (05 Jan 2022 13:40) (58 - 77)  BP: 88/63 (05 Jan 2022 13:40) (88/63 - 142/73)  BP(mean): 68 (05 Jan 2022 13:40) (68 - 68)  RR: 18 (05 Jan 2022 13:40) (18 - 18)  SpO2: 96% (05 Jan 2022 13:40) (96% - 100%)    ________________________________________________  PHYSICAL EXAM:  GENERAL: NAD  HEENT: Normocephalic;  conjunctivae and sclerae clear; moist mucous membranes;   NECK : supple  CHEST/LUNG: Clear to auscultation bilaterally with good air entry   HEART: S1 S2  regular; no murmurs, gallops or rubs  ABDOMEN: Soft, Nontender, Nondistended; Bowel sounds present  EXTREMITIES: no cyanosis; no edema; no calf tenderness  SKIN: warm and dry; no rash  NERVOUS SYSTEM:  Awake and alert; Oriented x 2-3 at time of my eval but slow at answering questions and needs to be redirected at times; no new deficits    _________________________________________________  LABS:                        10.4   8.16  )-----------( 300      ( 05 Jan 2022 08:21 )             32.8     01-05    145  |  111<H>  |  23<H>  ----------------------------<  93  4.3   |  29  |  0.99    Ca    10.1      05 Jan 2022 08:21    TPro  7.3  /  Alb  2.4<L>  /  TBili  0.2  /  DBili  x   /  AST  5<L>  /  ALT  9<L>  /  AlkPhos  69  01-05        CAPILLARY BLOOD GLUCOSE            RADIOLOGY & ADDITIONAL TESTS:    Imaging  Reviewed:  YES/NO    Consultant(s) Notes Reviewed:   YES/ No      Plan of care was discussed with patient and /or primary care giver; all questions and concerns were addressed

## 2022-01-05 NOTE — CONSULT NOTE ADULT - SUBJECTIVE AND OBJECTIVE BOX
CC: AMS.    HPI:  61F with pmhx of bipolar disorder, HTN, HLD, hypothyroidism presenting initially with AMS a/w bacteremia w/ UTI s/p abx, GI consulted for poor PO intake. History limited as patient AAOx2. Reports overall decreased appetite since admission. Denies any nausea, vomiting, diarrhea, constipation, melena, hematochezia, fever/chills, or other issues. Calorie count currently underway.     PAST MEDICAL HISTORY:   Above.    SURGICAL HISTORY:   No significant surgical hx.     MEDICATIONS:  ARIPiprazole 2 milliGRAM(s) Oral daily  aspirin  chewable 81 milliGRAM(s) Oral daily  atorvastatin 40 milliGRAM(s) Oral at bedtime  benztropine 2 milliGRAM(s) Oral daily  diVALproex  milliGRAM(s) Oral daily  diVALproex  milliGRAM(s) Oral at bedtime  FLUoxetine 60 milliGRAM(s) Oral daily  heparin   Injectable 5000 Unit(s) SubCutaneous every 8 hours  lactated ringers. 1000 milliLiter(s) IV Continuous <Continuous>  lactated ringers. 1000 milliLiter(s) IV Continuous <Continuous>  levothyroxine 175 MICROGram(s) Oral daily    ALLERGIES:  No Known Allergies    SOCIAL HISTORY:   Limited given mental status.     FAMILY HISTORY:  limited given mental status.     REVIEW OF SYSTEMS:  CONSTITUTIONAL: No weakness, fevers or chills.  EYES/ENT: No visual changes;  No vertigo or throat pain.  NECK: No pain or stiffness.  RESPIRATORY: No cough, wheezing, hemoptysis; No shortness of breath.  CARDIOVASCULAR: No chest pain or palpitations.  GASTROINTESTINAL: As per HPI.   GENITOURINARY: No dysuria, frequency or hematuria.  NEUROLOGICAL: No numbness or weakness.  SKIN: No itching, rashes.      PHYSICAL EXAM:  VITAL SIGNS:  T(C): 36.8 (01-05-22 @ 13:40), Max: 36.8 (01-05-22 @ 13:40)  HR: 77 (01-05-22 @ 13:40) (58 - 77)  BP: 88/63 (01-05-22 @ 13:40) (88/63 - 142/73)  RR: 18 (01-05-22 @ 13:40) (18 - 18)  SpO2: 96% (01-05-22 @ 13:40) (96% - 100%)  I/Os:     01-05-22 @ 07:01  -  01-05-22 @ 17:04  --------------------------------------------------------  IN: 200 mL / OUT: 0 mL / NET: 200 mL      GENERAL: NAD, lying in bed comfortably.  HEAD:  Atraumatic, Normocephalic.  EYES: EOMI, normal conjunctiva, no scleral icterus.  ENT: Moist mucous membranes.  NECK: Supple.  CHEST/LUNG: Clear to auscultation bilaterally; No rales, rhonchi, wheezing, or rubs. Unlabored respirations.  HEART: Regular rate and rhythm; No murmurs, rubs, or gallops.  ABDOMEN: BSx4; Soft, nontender, nondistended.  EXTREMITIES:  No edema.  NERVOUS SYSTEM:  A&Ox2, no obvious focal deficits.  SKIN: No rashes or lesions.      LABS:                         10.4   8.16  )-----------( 300      ( 05 Jan 2022 08:21 )             32.8     01-05    145  |  111<H>  |  23<H>  ----------------------------<  93  4.3   |  29  |  0.99    Ca    10.1      05 Jan 2022 08:21    TPro  7.3  /  Alb  2.4<L>  /  TBili  0.2  /  DBili  x   /  AST  5<L>  /  ALT  9<L>  /  AlkPhos  69  01-05          RADIOLOGY & ADDITIONAL TESTS: Reviewed.

## 2022-01-05 NOTE — CONSULT NOTE ADULT - ASSESSMENT
61F with pmhx above presenting initially with AMS, bacteremia w/ UTI s/p abx now improved. Remains AAOx2. GI consulted for concern for poor PO intake. Abdominal exam benign, soft, NTND. Pt reports generalized poor appetite since hospitalization, suspected multifactorial in setting of infection, hospitalization, poor mobility, underlying dementia. Prior XR w/ some stool burden, constipation. No dysphagia, abd pain, or n/v. Calorie count underway.   - Ensure adequate bowel regimen, Miralax 17g daily to titrate to 1-2 soft BMs/day.   - Encourage diet as tolerated.   - Follow-up calorie count.  - If no improvement, can consider imaging i.e. CT scan r/o any other etiologies for her diminished appetite.

## 2022-01-05 NOTE — PROGRESS NOTE ADULT - PROBLEM SELECTOR PLAN 1
pt with ams, progressively worsening for 1 week  has hx of bipolar disorder and hospitalizations due changes in behavior (last one in [place])  CTH negative for acute pathology, no focal neurological findings other than ams, but exam difficult to assess in current status.   likely due to infection (+UA, incontinence) and also worsening of dementia   UCx: NGTD  1st BCx grew GNRs most closely resembling Psychrobacter species  2nd BCx Coagulase negative Staph  Repeat blood cultures 12/23 no growth  Completed Rocephin 7 days--->C/w ceftin, to complete 14 days of abx for bacteremia. Completed  1/4/22  family was  concerned that patient is unable to walk as before, as baseline used to walk with walker; [facility] following.   S/p MRI today result as noted above. Reviewed with Dr Patel; no acute findings/ structural abnormalities No further inpatient w/u recommended. Patient to f/u with Neurology outpatient.   Continue to monitor mental status

## 2022-01-05 NOTE — PROGRESS NOTE ADULT - PROVIDER SPECIALTY LIST ADULT
Hospitalist
Internal Medicine
Neurology
Internal Medicine
Internal Medicine
Infectious Disease
Internal Medicine

## 2022-01-05 NOTE — BH CONSULTATION LIAISON PROGRESS NOTE - NSBHASSESSMENTFT_PSY_ALL_CORE
60yo F, domiciled at [facility] (through [facility]), with MHx of HTN, HLD, hypothyroidism, and PHx of  bipolar disorder (on VPA 250mg qAM/500mg qHS, Prozac 60mg daily, Risperdal 1mg BID), h/o multiple psych hospitalizations, pt reports last in 2000 for irregular behavior, who presented to the hospital for AMS, found to be bacteremic and treated. Psych now consulted for clearance prior to placement at [facility] due to psych hx. Pt psych cleared on 12/27, brother now insisting on re-eval of meds prior to DC. Rec DC'ing Risperdal and subsituting low-dose Abilify to improve alertness 62yo F, domiciled at [facility] (through [facility]), with MHx of HTN, HLD, hypothyroidism, and PHx of  bipolar disorder (on VPA 250mg qAM/500mg qHS, Prozac 60mg daily, Risperdal 1mg BID), h/o multiple psych hospitalizations (most recent 5/2021 for poor self-care), BIBEMs for AMS, found to be bacteremic and treated. Psych initially consulted for clearance prior to placement at [facility] due to psych hx. Pt psych cleared on 12/27, brother now insisting on re-eval of meds prior to DC. On exam, pt awake and alert but forgetful and confused. Impression dementia superimposed on baselines bipolar DO. Rec DC'ing Risperdal and subsituting low-dose Abilify to improve alertness. Pt does not appear to present an acute risk of harm to self or others at the time of assessment, and does not appear to be in need of admission to IP psych at the time of assessment.   62yo F, domiciled at [facility] (through [facility]), with MHx of HTN, HLD, hypothyroidism, and PHx of  bipolar disorder (on VPA 250mg qAM/500mg qHS, Prozac 60mg daily, Risperdal 1mg BID), h/o multiple psych hospitalizations (most recent 5/2021 for poor self-care), BIBEMs for AMS, found to be bacteremic and treated. Psych initially consulted for clearance prior to placement at [facility] due to psych hx. Pt psych cleared on 12/27, brother now insisting on re-eval of meds prior to DC. On exam, pt awake and alert but forgetful and confused. Impression dementia superimposed on baseline bipolar DO. Rec DC'ing Risperdal and subsituting low-dose Abilify to improve alertness. Pt does not appear to present an acute risk of harm to self or others at the time of assessment, and does not appear to be in need of admission to IP psych at the time of assessment.

## 2022-01-05 NOTE — PROGRESS NOTE ADULT - PROBLEM SELECTOR PLAN 5
hx of bipolar disorder on multiple meds  hx of non compliance, but now workers at the facility she lives in are giving her the medications  continue divalproex 250mg QD 500mg QHS, prozac 60mg QD, risperdal 1mg BID home dose  Tele Psych was previously  consulted: cleared patient for placement  Brother concerned patient with poor po intake initially on dysphagia diet; S&S reconsulted and diet advanced, Calorie count initiated 1/3, dietitian consulted 1/3 Ensure BID added--> increased to TID today   As d/w Attg Psych to be reconsulted and GI to be consulted in am  per Brother's request ( concerned patient is anorexic )

## 2022-01-05 NOTE — PROGRESS NOTE ADULT - ASSESSMENT
Pt is a 60 y/o Fx with pmhx of Bipolar disorder, HTN, HLD, Hypothyroidism, who presented to the ED BIBEMS due to AMS, found to be bacteremic with blood culture 1st BCx grew GNRs Psychrobacter species, 2nd BCx Coagulase negative Staph, treated with 7 days course of ceftriaxone and was transitioned to oral Ceftin, completed last dose on 1/4/22.     1/5/22- As per discussion with Attending, pt's brother is concerned about pt's frail state and gradual deterioration in light of decrease po intake. Pt was evaluated by psych (pending recs) and GI was consulted.  Her brain MRI from 1/4/22 did not show acute neurological changes or malignancy.  A CTAP w/oral and IV contrast was recommended to eval for remote chance of malignancy.  However, a shared decision involving pt's brother who is a radiologist opted to have study done as outpt since he feels that her poor oral intake is likely 2/2 depression.  Pt with an episode of hypotension w/o tachycardia or changes in neurological status and not febrile.  Hence, hypotension is likely a result from antihypertensive agents and decrease oral intake.  Antihypertensive meds d/c and IV hydration started.

## 2022-01-05 NOTE — PROGRESS NOTE ADULT - PROBLEM SELECTOR PROBLEM 2
UTI (urinary tract infection)
Patient/Caregiver provided printed discharge information.

## 2022-01-05 NOTE — PROGRESS NOTE ADULT - PROBLEM SELECTOR PROBLEM 1
Encephalopathy

## 2022-01-05 NOTE — PROGRESS NOTE ADULT - PROBLEM SELECTOR PLAN 9
TSH - 0.07  on levothyroxine 200mcg at home  Levothyroxine 175mcg (reduced dose)  Will need repeat TFTs in 6 weeks  Thyroid panel noted

## 2022-01-05 NOTE — CHART NOTE - NSCHARTNOTEFT_GEN_A_CORE
Assessment:   61yFemalePatient is a 61y old  Female who presents with a chief complaint of AMS (03 Jan 2022 12:42)      Factors impacting intake: [ ] none [ ] nausea  [ ] vomiting [ ] diarrhea [ ] constipation  [ ]chewing problems [ ] swallowing issues  [ x] other: asked by NP To see patient for oral supplementation. suggest To Add ensure enlive bid To approximate needs. was seen by swallow today where a minced a moist diet with thin liquids was recommended. Height and weight obtained from chart,: ht:160cm ,65.2kg BMI=25.5 . calorie count ordered by NP. sheets provided To RN.     Diet Presciption: minced and moist   Intake: inadequate     Current Weight: Weight (kg):   % Weight Change    Pertinent Medications: MEDICATIONS  (STANDING):  amLODIPine   Tablet 10 milliGRAM(s) Oral daily  aspirin  chewable 81 milliGRAM(s) Oral daily  atorvastatin 40 milliGRAM(s) Oral at bedtime  benztropine 2 milliGRAM(s) Oral daily  cefuroxime   Tablet 250 milliGRAM(s) Oral every 12 hours  diVALproex  milliGRAM(s) Oral daily  diVALproex  milliGRAM(s) Oral at bedtime  FLUoxetine 60 milliGRAM(s) Oral daily  heparin   Injectable 5000 Unit(s) SubCutaneous every 8 hours  lactated ringers. 1000 milliLiter(s) (75 mL/Hr) IV Continuous <Continuous>  levothyroxine 175 MICROGram(s) Oral daily  lisinopril 10 milliGRAM(s) Oral daily  metoprolol succinate  milliGRAM(s) Oral daily  risperiDONE   Tablet 1 milliGRAM(s) Oral two times a day    MEDICATIONS  (PRN):    Pertinent Labs: 01-03 Na143 mmol/L Glu 87 mg/dL K+ 4.2 mmol/L Cr  1.32 mg/dL<H> BUN 27 mg/dL<H> 01-01 Phos 3.8 mg/dL 01-01 Alb 2.2 g/dL<L> 12-21 Chol 118 mg/dL LDL --    HDL 28 mg/dL<L> Trig 139 mg/dL     CAPILLARY BLOOD GLUCOSE        Skin: No pressure injury    Estimated Needs:   [x ] no change since previous assessment  [ ] recalculated:     Previous Nutrition Diagnosis:   [ ] Inadequate Energy Intake [ ]Inadequate Oral Intake [ ] Excessive Energy Intake   [ ] Underweight [ ] Increased Nutrient Needs [ ] Overweight/Obesity   [ ] Altered GI Function [ ] Unintended Weight Loss [ ] Food & Nutrition Related Knowledge Deficit [ ] Malnutrition [x] swallowing difficulty     Nutrition Diagnosis is [x ] ongoing  [ ] resolved [ ] not applicable     New Nutrition Diagnosis: [ ] not applicable       Interventions:   Recommend  [ ] Change Diet To:  [ ] Nutrition Supplement  [ ] Nutrition Support  [x ] Other: provide minced and moist diet as per swallow, Add ensure enlive bid , 3 day calorie count    Monitoring and Evaluation:   [x ] PO intake [ x ] Tolerance to diet prescription [ x ] weights [ x ] labs[ x ] follow up per protocol  [ ] other:
Neuro NOte:    Spoke with patient's brother, he is concerned for her chronic cognitive decline noted as outpatient.  He wants her to be started on treatment for dementia in the hospital, he understand that clinical testing is limited in the hospital due to the patient's encephalopathy and superimposed, will start Aricept 5mg.  Patient should fu with neuro as outpatient.    pls call back with questions  dw Dr. Raya

## 2022-01-05 NOTE — PROGRESS NOTE ADULT - PROBLEM SELECTOR PLAN 10
heparin  protonix
heparin  protonix
DVT: continue w/heparin  GI; c/w protonix
heparin  protonix
DVT: continue w/heparin  GI; c/w protonix
DVT: continue w/heparin  GI; c/w protonix
heparin  protonix
heparin  protonix

## 2022-01-05 NOTE — PROGRESS NOTE ADULT - ATTENDING COMMENTS
62 yo F with schizophrenia, dementia who presented with toxic metabolic encephalopathy likely 2/2 Psychrobacter bacteremia. Plan was to transfer to [facility] on 12/30/21 but brother refused. Brother Mr. Zuly Canela is concerned about patient decline in mental status and functional status since three months ago where patient was independent enough to take a cab.     Discussed with Dr. Patel who discussed plan with patient's brother, starting aricept 5mg  Discussed plan with Mr Canela patient's brother who thinks now that patient might be depressed and as such not eating and sleeping. Requesting that patients diet be advanced to regular as she does not like thin liquid consistency and was tolerating regular diet at home.   He also had an extensive discussion with Dr. Sofia regarding patients medications and it was decided to dc respiridone and start abilify instead  Incidentally patient's BP noted to be low at 88/60 today, remained asx  Also attempt getting up with PT was only able to stand briefly on the side of the bed    #Concern for poor PO intake   -Calorie count showing patient eating 50-75% of meals  - ensure added 3-4x a day which patient has been tolerating  -Speech and Swallow re-evaluated the patient and now recommend thin liquids  - However, brother requesting patient advance to regular diet as tolerates that well at home   -GI Dr. Lucas Varela consulted, appreciate consult  - will add miralax and uptitrate bowel regimen  - after extensive discussion with brother on 1/5, wants to hold off on CT abdomen at this time   - brother is concerned that patient is depressed, medication adjustment made per psych recs.     #Hypotension, BP noted to be 88/63 during the afternoon. Patient remains asx  - no signs of infection no leukocytosis or fevers, will hold off on blood cx but will check a UA  - likely due to BP meds amlodipine and lisinopril, hold at this time and monitor BP closely  - orthostatic VS  - IVF     #Toxic Metabolic Encephalopathy likely 2/2 Psychrobacter bacteremia, now resolved with treatment of infection, Brother reports decline of mental status in the past month. Patient is AAOx2-3  - MRI brain done, Dr. Patel was consulted and discussed plan   - Per extensive conversation of brother and Dr. Patel recommending starting aricept as patient has poor outpatient f/u  - psych reconsulted again on 1/5/22, recommending respiridone to be changed to abilify      #Bacteremia, psychrobacter species  -Completed 7 day course of ceftriaxone  -s/p 14 days of abx with Ceftin on 1/4    #Schizophrenia  -Continue VPA 250mg QDaily and 500mg QHS  -Continue prozac 60mg QDaily  -Psych reconsulted and recommending change respiridone to abilify 2mg    #AYAN likely pre-renal improved with IVF, resolved  #HTN  #Progressive dementia  #Physical deconditioning  -Will need [facility] on discharge, accepted by [facility], authorization and Level 2 screen completed    #Hypothyroidism on levothyroxine, Ft14 and TSH elevated.   -Decreased levothyroxine to 175mcg from 200mcg, will need repeat TSH check in 6 weeks.     #Ambulatory Dysfunction  - likely due to deconditioning in the setting of prolonged hospital stay and   -PT recommends rehab at [facility]

## 2022-01-05 NOTE — PROGRESS NOTE ADULT - PROBLEM SELECTOR PLAN 4
admitted  with cr of 2.69, unknown baseline. Likely in the setting of poor po liquid intake;   s/p IVF ; LR @ 75cc/hr x 24hrs, lisinopril dose reduced to 10mg daily  --> AYNA now resolved   Continue to monitor BMP  Avoid nephrotoxic drugs

## 2022-01-06 VITALS
DIASTOLIC BLOOD PRESSURE: 55 MMHG | OXYGEN SATURATION: 99 % | SYSTOLIC BLOOD PRESSURE: 136 MMHG | HEART RATE: 68 BPM | TEMPERATURE: 99 F | RESPIRATION RATE: 18 BRPM

## 2022-01-06 LAB
ANION GAP SERPL CALC-SCNC: 5 MMOL/L — SIGNIFICANT CHANGE UP (ref 5–17)
BUN SERPL-MCNC: 26 MG/DL — HIGH (ref 7–18)
CALCIUM SERPL-MCNC: 9.3 MG/DL — SIGNIFICANT CHANGE UP (ref 8.4–10.5)
CHLORIDE SERPL-SCNC: 113 MMOL/L — HIGH (ref 96–108)
CO2 SERPL-SCNC: 29 MMOL/L — SIGNIFICANT CHANGE UP (ref 22–31)
CREAT SERPL-MCNC: 0.99 MG/DL — SIGNIFICANT CHANGE UP (ref 0.5–1.3)
GLUCOSE SERPL-MCNC: 106 MG/DL — HIGH (ref 70–99)
HCT VFR BLD CALC: 28 % — LOW (ref 34.5–45)
HGB BLD-MCNC: 9 G/DL — LOW (ref 11.5–15.5)
MAGNESIUM SERPL-MCNC: 1.8 MG/DL — SIGNIFICANT CHANGE UP (ref 1.6–2.6)
MCHC RBC-ENTMCNC: 30.8 PG — SIGNIFICANT CHANGE UP (ref 27–34)
MCHC RBC-ENTMCNC: 32.1 GM/DL — SIGNIFICANT CHANGE UP (ref 32–36)
MCV RBC AUTO: 95.9 FL — SIGNIFICANT CHANGE UP (ref 80–100)
NRBC # BLD: 0 /100 WBCS — SIGNIFICANT CHANGE UP (ref 0–0)
PHOSPHATE SERPL-MCNC: 4 MG/DL — SIGNIFICANT CHANGE UP (ref 2.5–4.5)
PLATELET # BLD AUTO: 266 K/UL — SIGNIFICANT CHANGE UP (ref 150–400)
POTASSIUM SERPL-MCNC: 4.5 MMOL/L — SIGNIFICANT CHANGE UP (ref 3.5–5.3)
POTASSIUM SERPL-SCNC: 4.5 MMOL/L — SIGNIFICANT CHANGE UP (ref 3.5–5.3)
RBC # BLD: 2.92 M/UL — LOW (ref 3.8–5.2)
RBC # FLD: 13 % — SIGNIFICANT CHANGE UP (ref 10.3–14.5)
SODIUM SERPL-SCNC: 147 MMOL/L — HIGH (ref 135–145)
WBC # BLD: 8.86 K/UL — SIGNIFICANT CHANGE UP (ref 3.8–10.5)
WBC # FLD AUTO: 8.86 K/UL — SIGNIFICANT CHANGE UP (ref 3.8–10.5)

## 2022-01-06 PROCEDURE — 87040 BLOOD CULTURE FOR BACTERIA: CPT

## 2022-01-06 PROCEDURE — 87077 CULTURE AEROBIC IDENTIFY: CPT

## 2022-01-06 PROCEDURE — 85610 PROTHROMBIN TIME: CPT

## 2022-01-06 PROCEDURE — 80307 DRUG TEST PRSMV CHEM ANLYZR: CPT

## 2022-01-06 PROCEDURE — 87086 URINE CULTURE/COLONY COUNT: CPT

## 2022-01-06 PROCEDURE — 85027 COMPLETE CBC AUTOMATED: CPT

## 2022-01-06 PROCEDURE — 84479 ASSAY OF THYROID (T3 OR T4): CPT

## 2022-01-06 PROCEDURE — 92610 EVALUATE SWALLOWING FUNCTION: CPT

## 2022-01-06 PROCEDURE — 36415 COLL VENOUS BLD VENIPUNCTURE: CPT

## 2022-01-06 PROCEDURE — 70553 MRI BRAIN STEM W/O & W/DYE: CPT

## 2022-01-06 PROCEDURE — 80053 COMPREHEN METABOLIC PANEL: CPT

## 2022-01-06 PROCEDURE — 84100 ASSAY OF PHOSPHORUS: CPT

## 2022-01-06 PROCEDURE — 87635 SARS-COV-2 COVID-19 AMP PRB: CPT

## 2022-01-06 PROCEDURE — 86803 HEPATITIS C AB TEST: CPT

## 2022-01-06 PROCEDURE — 70450 CT HEAD/BRAIN W/O DYE: CPT | Mod: MA

## 2022-01-06 PROCEDURE — 83540 ASSAY OF IRON: CPT

## 2022-01-06 PROCEDURE — 80048 BASIC METABOLIC PNL TOTAL CA: CPT

## 2022-01-06 PROCEDURE — 80061 LIPID PANEL: CPT

## 2022-01-06 PROCEDURE — 84436 ASSAY OF TOTAL THYROXINE: CPT

## 2022-01-06 PROCEDURE — 84443 ASSAY THYROID STIM HORMONE: CPT

## 2022-01-06 PROCEDURE — 82962 GLUCOSE BLOOD TEST: CPT

## 2022-01-06 PROCEDURE — 84480 ASSAY TRIIODOTHYRONINE (T3): CPT

## 2022-01-06 PROCEDURE — 96372 THER/PROPH/DIAG INJ SC/IM: CPT

## 2022-01-06 PROCEDURE — 71045 X-RAY EXAM CHEST 1 VIEW: CPT

## 2022-01-06 PROCEDURE — 99239 HOSP IP/OBS DSCHRG MGMT >30: CPT | Mod: GC

## 2022-01-06 PROCEDURE — 82607 VITAMIN B-12: CPT

## 2022-01-06 PROCEDURE — 83735 ASSAY OF MAGNESIUM: CPT

## 2022-01-06 PROCEDURE — 81001 URINALYSIS AUTO W/SCOPE: CPT

## 2022-01-06 PROCEDURE — 84439 ASSAY OF FREE THYROXINE: CPT

## 2022-01-06 PROCEDURE — 85730 THROMBOPLASTIN TIME PARTIAL: CPT

## 2022-01-06 PROCEDURE — 83550 IRON BINDING TEST: CPT

## 2022-01-06 PROCEDURE — 97530 THERAPEUTIC ACTIVITIES: CPT

## 2022-01-06 PROCEDURE — 82728 ASSAY OF FERRITIN: CPT

## 2022-01-06 PROCEDURE — 99285 EMERGENCY DEPT VISIT HI MDM: CPT | Mod: 25

## 2022-01-06 PROCEDURE — 87150 DNA/RNA AMPLIFIED PROBE: CPT

## 2022-01-06 PROCEDURE — 76770 US EXAM ABDO BACK WALL COMP: CPT

## 2022-01-06 PROCEDURE — 99232 SBSQ HOSP IP/OBS MODERATE 35: CPT

## 2022-01-06 PROCEDURE — 74018 RADEX ABDOMEN 1 VIEW: CPT

## 2022-01-06 PROCEDURE — 87186 SC STD MICRODIL/AGAR DIL: CPT

## 2022-01-06 PROCEDURE — 93005 ELECTROCARDIOGRAM TRACING: CPT

## 2022-01-06 PROCEDURE — 83036 HEMOGLOBIN GLYCOSYLATED A1C: CPT

## 2022-01-06 PROCEDURE — 85025 COMPLETE CBC W/AUTO DIFF WBC: CPT

## 2022-01-06 RX ORDER — ARIPIPRAZOLE 15 MG/1
1 TABLET ORAL
Qty: 0 | Refills: 0 | DISCHARGE
Start: 2022-01-06

## 2022-01-06 RX ADMIN — Medication 2 MILLIGRAM(S): at 11:40

## 2022-01-06 RX ADMIN — Medication 81 MILLIGRAM(S): at 11:40

## 2022-01-06 RX ADMIN — DIVALPROEX SODIUM 500 MILLIGRAM(S): 500 TABLET, DELAYED RELEASE ORAL at 21:36

## 2022-01-06 RX ADMIN — HEPARIN SODIUM 5000 UNIT(S): 5000 INJECTION INTRAVENOUS; SUBCUTANEOUS at 21:34

## 2022-01-06 RX ADMIN — HEPARIN SODIUM 5000 UNIT(S): 5000 INJECTION INTRAVENOUS; SUBCUTANEOUS at 13:29

## 2022-01-06 RX ADMIN — Medication 60 MILLIGRAM(S): at 11:40

## 2022-01-06 RX ADMIN — ATORVASTATIN CALCIUM 40 MILLIGRAM(S): 80 TABLET, FILM COATED ORAL at 21:34

## 2022-01-06 RX ADMIN — ARIPIPRAZOLE 2 MILLIGRAM(S): 15 TABLET ORAL at 12:03

## 2022-01-06 RX ADMIN — HEPARIN SODIUM 5000 UNIT(S): 5000 INJECTION INTRAVENOUS; SUBCUTANEOUS at 06:05

## 2022-01-06 RX ADMIN — Medication 175 MICROGRAM(S): at 06:03

## 2022-01-06 RX ADMIN — DIVALPROEX SODIUM 250 MILLIGRAM(S): 500 TABLET, DELAYED RELEASE ORAL at 11:40

## 2022-01-06 NOTE — BH CONSULTATION LIAISON PROGRESS NOTE - NSBHCHARTREVIEWVS_PSY_A_CORE FT
Vital Signs Last 24 Hrs  T(C): 36.8 (05 Jan 2022 13:40), Max: 36.8 (05 Jan 2022 13:40)  T(F): 98.2 (05 Jan 2022 13:40), Max: 98.2 (05 Jan 2022 13:40)  HR: 77 (05 Jan 2022 13:40) (58 - 77)  BP: 88/63 (05 Jan 2022 13:40) (88/63 - 142/73)  BP(mean): 68 (05 Jan 2022 13:40) (68 - 68)  RR: 18 (05 Jan 2022 13:40) (18 - 18)  SpO2: 96% (05 Jan 2022 13:40) (96% - 100%)
Vital Signs Last 24 Hrs  T(C): 36.7 (06 Jan 2022 14:51), Max: 37 (06 Jan 2022 06:25)  T(F): 98.1 (06 Jan 2022 14:51), Max: 98.6 (06 Jan 2022 06:25)  HR: 61 (06 Jan 2022 14:51) (61 - 67)  BP: 119/57 (06 Jan 2022 14:51) (119/57 - 148/61)  BP(mean): --  RR: 18 (06 Jan 2022 14:51) (18 - 18)  SpO2: 100% (06 Jan 2022 14:51) (100% - 100%)

## 2022-01-06 NOTE — DISCHARGE NOTE NURSING/CASE MANAGEMENT/SOCIAL WORK - PATIENT PORTAL LINK FT
You can access the FollowMyHealth Patient Portal offered by [facility] by registering at the following website: [email or web address]. By joining [employer]’s FollowMyHealth portal, you will also be able to view your health information using other applications (apps) compatible with our system.

## 2022-01-06 NOTE — DISCHARGE NOTE NURSING/CASE MANAGEMENT/SOCIAL WORK - NSDCPEFALRISK_GEN_ALL_CORE
For information on Fall & Injury Prevention, visit: [email or web address] OR  [email or web address] OR  https://www.cdc.gov/steadi/patient.html

## 2022-01-06 NOTE — BH CONSULTATION LIAISON PROGRESS NOTE - NSBHCONSULTRECOMMENDOTHER_PSY_A_CORE FT
1. Recommend c/w Abilify 2 mg qd as replacement for Risperdal to improve alertness  2. C/w other home meds as reported: Prozac 60 mg qd, Depakote 250 mg qam/500 mg qhs  3. Medical management as directed by primary team  4. Pt is psych cleared for D/C to [facility] as soon as she is medically optimized  5. Psychiatry is signing off  6. Case d/w Dr. Raya of primary team    Alberta Sofia MD  Director, Consultation-Liaison Psychiatry Service  [id number]      
1. Recommend DC'ing Risperdal and substituting Abilify 2 mg qd to improve alertness  2. C/w other home meds as reported: Prozac 60 mg qd, Depakote 250 mg qam/500 mg qhs  3. Medical management as directed by primary team  4. Call has been placed to pt's PNP Kimi Machado for collateral/care coordination, awaiting response  5. Psychiatry will continue to follow  6. Case d/w Dr. Raya of primary team and pt's brother    Alberta Sofia MD  Director, Consultation-Liaison Psychiatry Service  [id number]

## 2022-01-06 NOTE — BH CONSULTATION LIAISON PROGRESS NOTE - NSBHFUPINTERVALHXFT_PSY_A_CORE
After conversation with pt's brother last night, MD located pt's records in [email or web address] and learned that the following medication changes had been made by pt's PNP Kimi Carter after assuming her care on 3/11/21: 1. Haldol decanoate 100 mg ESPANA was DC'd. 2. Risperdal 1 mg BID was started. 3. Pt's dose of Prozac was increased from 20 mg qd to 60 mg qd. Per staff reports, pt's brother changed his mind today and decided to accept offered [facility] bed at [place]. Pt seen in her room on 3S for interview, found sitting up in bed awake and alert. Pt described mood as "much better" and denied SI/HI/AVH. Asked her opinion of Abilify, pt said she could tell it was helping her, because she felt more awake and could see things around her more clearly. MD asked why NP had DC'd Haldol decanoate, and pt said it was at her own request: "It was too much--I didn't need it anymore." Pt said she now feels "comfortable" in the hospital and would like to stay here, but MD informed her she is expected to leave this afternoon for [facility], where she can work on improving her mobility. Pt expressed agreement.

## 2022-01-06 NOTE — BH CONSULTATION LIAISON PROGRESS NOTE - CURRENT MEDICATION
MEDICATIONS  (STANDING):  ARIPiprazole 2 milliGRAM(s) Oral daily  aspirin  chewable 81 milliGRAM(s) Oral daily  atorvastatin 40 milliGRAM(s) Oral at bedtime  benztropine 2 milliGRAM(s) Oral daily  diVALproex  milliGRAM(s) Oral daily  diVALproex  milliGRAM(s) Oral at bedtime  FLUoxetine 60 milliGRAM(s) Oral daily  heparin   Injectable 5000 Unit(s) SubCutaneous every 8 hours  lactated ringers. 1000 milliLiter(s) (75 mL/Hr) IV Continuous <Continuous>  lactated ringers. 1000 milliLiter(s) (75 mL/Hr) IV Continuous <Continuous>  levothyroxine 175 MICROGram(s) Oral daily    MEDICATIONS  (PRN):  
MEDICATIONS  (STANDING):  ARIPiprazole 2 milliGRAM(s) Oral daily  aspirin  chewable 81 milliGRAM(s) Oral daily  atorvastatin 40 milliGRAM(s) Oral at bedtime  benztropine 2 milliGRAM(s) Oral daily  diVALproex  milliGRAM(s) Oral daily  diVALproex  milliGRAM(s) Oral at bedtime  FLUoxetine 60 milliGRAM(s) Oral daily  heparin   Injectable 5000 Unit(s) SubCutaneous every 8 hours  lactated ringers. 1000 milliLiter(s) (75 mL/Hr) IV Continuous <Continuous>  lactated ringers. 1000 milliLiter(s) (75 mL/Hr) IV Continuous <Continuous>  levothyroxine 175 MICROGram(s) Oral daily    MEDICATIONS  (PRN):

## 2022-01-06 NOTE — BH CONSULTATION LIAISON PROGRESS NOTE - NSBHCHARTREVIEWLAB_PSY_A_CORE FT
9.0    8.86  )-----------( 266      ( 06 Jan 2022 06:14 )             28.0   01-06    147<H>  |  113<H>  |  26<H>  ----------------------------<  106<H>  4.5   |  29  |  0.99    Ca    9.3      06 Jan 2022 06:14  Phos  4.0     01-06  Mg     1.8     01-06    TPro  7.3  /  Alb  2.4<L>  /  TBili  0.2  /  DBili  x   /  AST  5<L>  /  ALT  9<L>  /  AlkPhos  69  01-05  
                      10.4   8.16  )-----------( 300      ( 05 Jan 2022 08:21 )             32.8   01-05    145  |  111<H>  |  23<H>  ----------------------------<  93  4.3   |  29  |  0.99    Ca    10.1      05 Jan 2022 08:21    TPro  7.3  /  Alb  2.4<L>  /  TBili  0.2  /  DBili  x   /  AST  5<L>  /  ALT  9<L>  /  AlkPhos  69  01-05

## 2022-06-06 NOTE — PROGRESS NOTE ADULT - PROBLEM SELECTOR PLAN 1
From: Matt Mejia  To: Claudeen Alder  Sent: 6/6/2022 2:04 PM CDT  Subject: Referrals? This message is being sent by Ijeoma Moody on behalf of Matt Mejia. Dionicio - thanks for seeing Carmella Steele today. We did do a home COVID test which was negative, so we will manage symptoms until he feels better. We had neuropsych testing done at the recommendation of our OT. We got the feedback today - he has officially been diagnosed with ADHD and ASD - and has sensory seeking behaviors as well. I will make sure you get a copy of the full report once I have it. The neuropsych recommended we follow up with a neurologist due to higher likelihood of seizures in ASD and get genetic testing. She also recommended a possible sleep study given the difficulties we have in sleep and his waking once a night minimum every night. She mentioned he might be having seizures that could be waking him? Wondering, do you have any referrals for sleep specialist/MD, neurology, or genetic testing? We typically try to stay with [facility] for the specialty needs, but neurology is booking into Jan 2023 and sleep into Dec 2022. Thank you!    Cookie Cole pt with ams, progressively worsening for 1 week  has hx of bipolar disorder and hospitalizations due changes in behavior (last one in [place])  CTH negative for acute pathology, no focal neurological findings other than ams, but exam difficult to assess in current status.   likely due to infection (+UA, incontinence) and also worsening of dementia   UCx: NGTD  1st BCx grew GNRs Psychrobacter species  2nd BCx Coagulase negative Staph  Repeat blood cultures NGTD  completed Rocephin 7 days  c/w ceftin, to complete 14 days of abx for bacteremia until 1/3/22  at baseline ; RESOLVED  family is concerned that patient is unable to walk, as baseline used to walk with walker  Consulted Dr. Schneider: MRI Brain

## 2022-07-04 NOTE — BH CONSULTATION LIAISON PROGRESS NOTE - NSBHASSESSMENTFT_PSY_ALL_CORE
[facility]    Mateo Ruby MD, am the primary clinician of record. CHIEF COMPLAINT  Chief Complaint   Patient presents with    Panic Attack     and confused sent by Dr. Tran Roots states can't eat x 2 days    Abdominal Pain     denies vomiting or diarrhea   c/o nausea        HISTORY OF PRESENT ILLNESS  Quan Irizarry is a 76 y.o. male  who presents to the ED complaining of being sent here for anxiety according to his primary care physician. He does report some suprapubic abdominal discomfort with some urinary urgency and nausea but no vomiting diarrhea or constipation. He has not had headaches. Denies any focal numbness tingling or weakness. He says he is overwhelmed in general with anxiety and also overwhelmed by feeling of impending doom \"like I am dying\" however denies any chest pain shortness of breath or any specific reason to his anxiety that he can think of except for loss of several family and friends over the past year. His appetite has been poor but over the past few days in particular he has had essentially no p.o. intake because of his anxiousness. He is also being evaluated for a new lung nodule that is concerning for an oncologic process based on a CT scan from June 20 with plan for outpatient biopsy in the near future. He also has chronic back pain but this is actually fairly stable for today and being referred to neurosurgery to consider an epidural but is not an acute complaint related to his presentation. He has a history of hypercholesterolemia and hypertension. PCP sent him in today for evaluation because he apparently has been confused over the past few days as well. The patient describes it more as a profound cognitive fog where his \"mind is going 100 miles an hour but my body is not\". He is not frankly disoriented but does seem quite forgetful when he tries to get up to do something.     No other complaints, modifying factors or  Attends Club or Organization Meetings: Not on file    Marital Status: Not on file   Intimate Partner Violence:     Fear of Current or Ex-Partner: Not on file    Emotionally Abused: Not on file    Physically Abused: Not on file    Sexually Abused: Not on file   Housing Stability:     Unable to Pay for Housing in the Last Year: Not on file    Number of [place] in the Last Year: Not on file    Unstable Housing in the Last Year: Not on file     No current facility-administered medications for this encounter. Current Outpatient Medications   Medication Sig Dispense Refill    ibuprofen (ADVIL;MOTRIN) 400 MG tablet Take 400 mg by mouth every 6 hours as needed for Pain      citalopram (CELEXA) 40 MG tablet Take 1 tablet by mouth daily 90 tablet 0    doxazosin (CARDURA) 2 MG tablet Take 1 tablet by mouth daily 90 tablet 0    atorvastatin (LIPITOR) 10 MG tablet Take 0.5 tablets by mouth daily 90 tablet 0    finasteride (PROSCAR) 5 MG tablet Take 1 tablet by mouth daily 90 tablet 0    tamsulosin (FLOMAX) 0.4 MG capsule Take 1 capsule by mouth daily 90 capsule 0    gabapentin (NEURONTIN) 300 MG capsule Take 1 capsule by mouth 2 times daily for 90 days.  180 capsule 0    metoprolol tartrate (LOPRESSOR) 25 MG tablet Take 0.5 tablets by mouth 2 times daily 90 tablet 0    brexpiprazole (REXULTI) 2 MG TABS tablet Take 1 tablet by mouth daily 90 tablet 0    omeprazole (PRILOSEC) 40 MG delayed release capsule Take 1 capsule by mouth every morning (before breakfast) 30 capsule 1    butalbital-acetaminophen-caffeine (FIORICET, ESGIC) -40 MG per tablet Take 1 tablet by mouth every 6 hours as needed for Headaches 90 tablet 0    fluticasone (FLONASE) 50 MCG/ACT nasal spray 1 spray by Each Nostril route daily 16 g 3    ipratropium (ATROVENT) 0.03 % nasal spray 2 sprays by Each Nostril route daily 30 mL 3    rivaroxaban (XARELTO) 20 MG TABS tablet Take 1 tablet by mouth daily (with breakfast) 90 tablet 1     Allergies   Allergen Reactions    Shellfish Allergy     Augmentin [Amoxicillin-Pot Clavulanate] Nausea And Vomiting       REVIEW OF SYSTEMS  10 systems reviewed, pertinent positives per HPI otherwise noted to be negative. PHYSICAL EXAM  BP (!) 104/53   Pulse 80   Temp 98.4 °F (36.9 °C)   Resp 16   Ht 5' 9\" (1.753 m)   Wt 171 lb (77.6 kg)   SpO2 96%   BMI 25.25 kg/m²    GENERAL APPEARANCE: Awake and alert. Cooperative. No distress. HENT: Normocephalic. Atraumatic. Mucous membranes are dry. NECK: Supple. EYES: PERRL. EOM's grossly intact. HEART/CHEST: RRR. No murmurs. No chest wall tenderness. LUNGS: Respirations unlabored. CTAB. Good air exchange. Speaking comfortably in full sentences. ABDOMEN: No tenderness. Soft. Non-distended. No masses. No organomegaly. No guarding or rebound. Normal bowel sounds throughout. MUSCULOSKELETAL: No extremity edema. Compartments soft. No deformity. No tenderness in the extremities. All extremities neurovascularly intact. SKIN: Warm and dry. No acute rashes. NEUROLOGICAL: Alert and oriented. CN's 2-12 intact. No gross facial drooping. Strength 5/5, sensation intact. 2 plus DTR's in knees bilaterally. Gait normal.  PSYCHIATRIC: Anxious, distractible, slow to respond to questions but not disoriented or hallucinating. No suicidal or homicidal thoughts. Not circumstantial or tangential with thought process. LABS  I have reviewed all labs for this visit.    Results for orders placed or performed during the hospital encounter of 07/04/22   Urinalysis with Reflex to Culture    Specimen: Urine, clean catch   Result Value Ref Range    Color, UA Yellow Straw/Yellow    Clarity, UA Clear Clear    Glucose, Ur Negative Negative mg/dL    Bilirubin Urine Negative Negative    Ketones, Urine Negative Negative mg/dL    Specific Gravity, UA 1.025 1.005 - 1.030    Blood, Urine LARGE (A) Negative    pH, UA 5.5 5.0 - 8.0    Protein, UA Negative Negative mg/dL Urobilinogen, Urine 0.2 <2.0 E.U./dL    Nitrite, Urine Negative Negative    Leukocyte Esterase, Urine Negative Negative    Microscopic Examination YES     Urine Type NotGiven     Urine Reflex to Culture Not Indicated    CBC with Auto Differential   Result Value Ref Range    WBC 9.9 4.0 - 11.0 K/uL    RBC 4.85 4.20 - 5.90 M/uL    Hemoglobin 15.0 13.5 - 17.5 g/dL    Hematocrit 43.9 40.5 - 52.5 %    MCV 90.3 80.0 - 100.0 fL    MCH 30.9 26.0 - 34.0 pg    MCHC 34.2 31.0 - 36.0 g/dL    RDW 13.8 12.4 - 15.4 %    Platelets 729 615 - 299 K/uL    MPV 9.2 5.0 - 10.5 fL    Neutrophils % 64.6 %    Lymphocytes % 29.4 %    Monocytes % 4.7 %    Eosinophils % 0.5 %    Basophils % 0.8 %    Neutrophils Absolute 6.4 1.7 - 7.7 K/uL    Lymphocytes Absolute 2.9 1.0 - 5.1 K/uL    Monocytes Absolute 0.5 0.0 - 1.3 K/uL    Eosinophils Absolute 0.0 0.0 - 0.6 K/uL    Basophils Absolute 0.1 0.0 - 0.2 K/uL   Comprehensive Metabolic Panel w/ Reflex to MG   Result Value Ref Range    Sodium 143 136 - 145 mmol/L    Potassium reflex Magnesium 4.5 3.5 - 5.1 mmol/L    Chloride 107 99 - 110 mmol/L    CO2 28 21 - 32 mmol/L    Anion Gap 8 3 - 16    Glucose 113 (H) 70 - 99 mg/dL    BUN 18 7 - 20 mg/dL    CREATININE 0.9 0.8 - 1.3 mg/dL    GFR Non-African American >60 >60    GFR African American >60 >60    Calcium 10.0 8.3 - 10.6 mg/dL    Total Protein 6.5 6.4 - 8.2 g/dL    Albumin 4.7 3.4 - 5.0 g/dL    Albumin/Globulin Ratio 2.6 (H) 1.1 - 2.2    Total Bilirubin 1.5 (H) 0.0 - 1.0 mg/dL    Alkaline Phosphatase 60 40 - 129 U/L    ALT 13 10 - 40 U/L    AST 13 (L) 15 - 37 U/L   Troponin   Result Value Ref Range    Troponin <0.01 <0.01 ng/mL   Brain Natriuretic Peptide   Result Value Ref Range    Pro- 0 - 449 pg/mL   Lipase   Result Value Ref Range    Lipase 18.0 13.0 - 60.0 U/L   Protime-INR   Result Value Ref Range    Protime 20.7 (H) 11.7 - 14.5 sec    INR 1.78 (H) 0.87 - 1.14   Microscopic Urinalysis   Result Value Ref Range    Bacteria, UA None Seen None Seen /HPF    Urinalysis Comments see below     Hyaline Casts, UA 3 0 - 8 /LPF    WBC, UA 4 0 - 5 /HPF    RBC, UA 23 (H) 0 - 4 /HPF    Epithelial Cells, UA 4 0 - 5 /HPF   EKG 12 Lead   Result Value Ref Range    Ventricular Rate 75 BPM    Atrial Rate 75 BPM    P-R Interval 176 ms    QRS Duration 92 ms    Q-T Interval 382 ms    QTc Calculation (Bazett) 426 ms    P Axis 78 degrees    R Axis 13 degrees    T Axis 39 degrees    Diagnosis       Sinus rhythm with marked sinus arrhythmia with junctional escape complexesOtherwise normal ECG     The 12 lead EKG was interpreted by me as follows:  Rate: normal with a rate of 75  Rhythm: sinus with sinus arrhythmia  Axis: normal  Intervals: normal DE, narrow QRS, normal QTc  ST segments: no ST elevations or depressions  T waves: no abnormal inversions  Non-specific T wave changes: not present  Prior EKG comparison: EKG dated 4/5/21 is not significantly different    RADIOLOGY    CT ABDOMEN PELVIS WO CONTRAST Additional Contrast? None    Result Date: 7/4/2022  EXAMINATION: CT OF THE ABDOMEN AND PELVIS WITHOUT CONTRAST 7/4/2022 11:26 am TECHNIQUE: CT of the abdomen and pelvis was performed without the administration of intravenous contrast. Multiplanar reformatted images are provided for review. Automated exposure control, iterative reconstruction, and/or weight based adjustment of the mA/kV was utilized to reduce the radiation dose to as low as reasonably achievable. COMPARISON: 02/27/2021 HISTORY: ORDERING SYSTEM PROVIDED HISTORY: suprapubic abd pain TECHNOLOGIST PROVIDED HISTORY: Reason for exam:->suprapubic abd pain Additional Contrast?->None Decision Support Exception - unselect if not a suspected or confirmed emergency medical condition->Emergency Medical Condition (MA) Reason for Exam: suprapubic abd pain FINDINGS: Lower Chest: Basilar atelectasis. Small hiatal hernia. Organs: Lack of IV and oral contrast limits sensitivity for visceral organ pathology.  Nonobstructing [age]yo F, domiciled at residence for people with mental illness (through [facility] [place]), with MHx of HTN, HLD, hypothyroidism, and PHx of  bipolar disorder (on VPA 250mg qAM/500mg qHS, Prozac 60mg daily, Risperdal 1mg BID), h/o multiple psych hospitalizations (most recent 5/2021 for poor self-care), BIBEMs for AMS, found to be bacteremic and treated. Psych initially consulted for clearance prior to placement at [facility] due to psych hx. Pt psych cleared on 12/27, brother now insisting on re-eval of meds prior to DC. On exam 1/5, pt awake and alert but forgetful and confused. Impression dementia superimposed on baseline bipolar DO. Rec'd DC'ing Risperdal and substituting low-dose Abilify to improve alertness. On f/u today, pt presents much more awake, alert and euthymic and agrees to planned DC to [facility] so she can recover her mobility. Pt is psych cleared for DC to [facility] on revised medication regimen. Pt does not appear to present an acute risk of harm to self or others at the time of assessment, and does not appear to be in need of admission to IP psych at the time of assessment.   mm calculus is seen at the upper pole of the right kidney. Additional fluid density lesions within each kidney, typically on the basis of cysts. No ureteral calculus. Calcification or calculus along thickened gallbladder fundus, similar to prior. Calcified granulomas within the spleen. Noncontrast views of the liver are grossly unremarkable. Small hiatal hernia though stomach is otherwise unremarkable. No pancreatic ductal dilatation or stranding. Thickening and calcification of the left adrenal gland, Hounsfield units 4, not significantly changed. Calcification of the abdominal aorta and iliac arteries. Retroperitoneal adenopathy is again demonstrated, for example in the aortocaval region where a 1.1 cm short axis node is seen. 1 cm short axis left external iliac chain lymph node is similar to prior. GI/Bowel: Loops of small and large bowel are nondilated. Appendix is within normal limits in caliber. Pelvis: Bladder is unremarkable. Calcification of prostate. No inguinal adenopathy. Peritoneum/Retroperitoneum: No free air. Bones/Soft Tissues: Sclerotic focus within the medial right iliac bone, similar to prior. Degenerative change of the spine. Trabeculated T12 lesion, likely hemangioma. Nonobstructing right nephrolithiasis. Persistent retroperitoneal and iliac chain adenopathy, which could be reactive or neoplastic. Small hiatal hernia. RECOMMENDATIONS: Unavailable     CT HEAD WO CONTRAST    Result Date: 7/4/2022  EXAMINATION: CT OF THE HEAD WITHOUT CONTRAST  7/4/2022 11:26 am TECHNIQUE: CT of the head was performed without the administration of intravenous contrast. Automated exposure control, iterative reconstruction, and/or weight based adjustment of the mA/kV was utilized to reduce the radiation dose to as low as reasonably achievable. COMPARISON: None.  HISTORY: ORDERING SYSTEM PROVIDED HISTORY: ams TECHNOLOGIST PROVIDED HISTORY: Reason for exam:->ams Has a \"code stroke\" or \"stroke alert\" been called? ->No Decision Support Exception - unselect if not a suspected or confirmed emergency medical condition->Emergency Medical Condition (MA) Reason for Exam: ams FINDINGS: BRAIN/VENTRICLES: No mass effect or midline shift. No hydrocephalus. Mild periventricular white matter hypodensity is seen bilaterally. No gross acute hemorrhage. 2 ORBITS: Postoperative changes of the globes. SINUSES: The visualized paranasal sinuses and mastoid air cells demonstrate no acute abnormality. SOFT TISSUES/SKULL:  No acute abnormality of the visualized skull or soft tissues. Mild small vessel ischemic disease. RECOMMENDATIONS: Unavailable     CT CHEST W CONTRAST    Result Date: 6/22/2022  EXAMINATION: CT OF THE CHEST WITH CONTRAST 6/20/2022 5:49 pm TECHNIQUE: CT of the chest was performed with the administration of intravenous contrast. Multiplanar reformatted images are provided for review. Automated exposure control, iterative reconstruction, and/or weight based adjustment of the mA/kV was utilized to reduce the radiation dose to as low as reasonably achievable. COMPARISON: Recent chest x-ray Prior chest CT 2017 HISTORY: ORDERING SYSTEM PROVIDED HISTORY: Pulmonary nodule, left TECHNOLOGIST PROVIDED HISTORY: STAT Creatinine as needed:->No Reason for Exam: Dx: Pulmonary nodule, left R91.1 (ICD-10-CM). FINDINGS: Mediastinum: Calcifications are seen in the thyroid. Hypodense nodule seen in the thyroid measuring 4 mm in size or less. Small mediastinal and hilar nodes are noted. No pericardial effusion is seen. Small hiatal hernia seen. There is nonspecific thickening at the GE junction. Small calcified and noncalcified mediastinal nodes are noted. Lungs/pleura: Respiratory motion artifact limits evaluation of fine pulmonary parenchymal change. Mild underlying emphysema is seen. Mosaic attenuation is seen throughout the lungs, suggesting small airways disease or air trapping. There is mild fusiform bronchiectasis. Scarring is seen in the lingula. Scarring is seen in the right middle lobe. 9 mm by 8 mm nodule is seen in the left upper lobe. .  This is increased in size compared to 2017 Upper Abdomen: Right adrenal gland is normal.  Hypodense nodule seen in left adrenal gland measuring 1.5 cm, compatible with adenoma. Small calcifications seen in the left adrenal gland. .  This is unchanged there is mild splenomegaly Stones are seen in the right kidney measuring 4 mm in size Small lymph nodes are seen in the right periaortic region, incompletely evaluated,, probably unchanged compared to prior chest CT Soft Tissues/Bones: High spurring is seen in the spine. Spurring is seen in the shoulder joints. Small axillary nodes are seen, measuring up to 1.4 cm x 10 mm on the right., probably unchanged compared to prior chest CT.     9 mm nodular density is seen in the left upper lobe. This is suspicious for early lung carcinoma given that it appears increased in size compared to 2017. Gigi Suazo Consider PET-CT Nonobstructing right renal stone RECOMMENDATIONS: Fleischner Society guidelines for follow-up and management of incidentally detected pulmonary nodules: Nodule size greater than 8 mm In a low-risk patient, consider CT at 3 months, PET/CT, or tissue sampling. In a high-risk patient, consider CT at 3 months, PET/CT, or tissue sampling. - Low risk patients include individuals with minimal or absent history of smoking and other known risk factors. - High risk patients include individuals with a history or smoking or known risk factors. Radiology 2017 http://pubs. rsna.org/doi/full/10.1148/radiol. 8124213423     XR CHEST PORTABLE    Result Date: 7/4/2022  EXAMINATION: ONE XRAY VIEW OF THE CHEST 7/4/2022 11:24 am COMPARISON: 05/20/2022.  HISTORY: ORDERING SYSTEM PROVIDED HISTORY: ams TECHNOLOGIST PROVIDED HISTORY: Reason for exam:->ams Reason for Exam: Panic Attack (and confused sent by Dr. Reid Lacks states can't eat x 2 days) FINDINGS: The cardiomediastinal silhouette is unremarkable. The lungs are clear. No infiltrate, pleural fluid or focal process is identified. No acute cardiopulmonary disease. ED COURSE/MDM  Patient seen and evaluated. Old records reviewed. Labs and imaging reviewed and results discussed with patient. After initial evaluation, differential diagnostic considerations included: stroke, TIA, intracranial bleed, trauma, infection/sepsis, medication side effect, intoxication/withdrawal, metabolic/electrolyte abnormalities    The patient's ED workup was notable for initial report of altered mental status/confusion but this seems to be more of a cognitive fog and memory decline from anxiousness. Patient's head CT is nonacute. CT of the abdomen and pelvis does show some lymphadenopathy similar to February 2021. He also has a recent lung mass that may be contributing to his anxiety. He does not appear to have an infectious or metabolic cause of his symptoms based on his evaluation today. I did consult his primary care physician who sent him in and he will be admitted under observation status to consider pulmonary and psychiatric consult as an inpatient. Is this patient to be included in the SEP-1 Core Measure? No   Exclusion criteria - the patient is NOT to be included for SEP-1 Core Measure due to: Infection is not suspected      During the patient's ED course, the patient was given:  Medications   LORazepam (ATIVAN) tablet 1 mg (1 mg Oral Given 7/4/22 1237)        CLINICAL IMPRESSION  1. Anxiousness    2. Lung mass    3. Retroperitoneal lymphadenopathy    4. Cognitive changes        Blood pressure (!) 104/53, pulse 80, temperature 98.4 °F (36.9 °C), resp. rate 16, height 5' 9\" (1.753 m), weight 171 lb (77.6 kg), SpO2 96 %. DISPOSITION  Angella Hernandez was admitted in fair condition. The plan is to admit to the hospital at this time under the PCP's admitting service.   Dr. Yana Hays accepted the patient and will take over the patient's care. DISCLAIMER: This chart was created using Dragon dictation software. Efforts were made by me to ensure accuracy, however some errors may be present due to limitations of this technology and occasionally words are not transcribed correctly.         Layo Gonsalves MD  07/04/22 9640

## 2022-07-19 NOTE — H&P ADULT - NSTOBACCOSCREENHP_GEN_A_CS
What Is The Reason For Today's Visit?: Preventative Skin Check Unable to assess due to patient's cognitive impairment 27-Feb-2021 00:29

## 2022-10-03 NOTE — ED ADULT NURSE NOTE - NS ED NOTE ABUSE RESPONSE YN
Procedure/Condition: Cystoscopy-Urolift     Activity:   1. Rest today. Resume usual daily activities tomorrow.   2. Do not drive a motor vehicle, operate machinery or appliances, make important decisions, or drink alcohol for the next 24 hours.   3. A responsible adult needs to oversee your care today.   4. No lifting >10 pounds for one week      Diet:    1. Light foods today to decrease the chance of nausea from the anesthetic.  Tomorrow your usual diet.     2. Drink at least 8-10 glasses of fluid per day for the next several days, if not on a fluid restriction diet.     Medications:   Your pain medication ______________ contains Acetaminophen/Tylenol.  Do not take additional Acetaminophen/Tylenol while on this medication.    Take your medication as prescribed.  If antibiotics were ordered, continue to take them until all tablets are gone unless you have a reaction to the medication.  If reaction occurs, STOP the medication and call your physician’s office.  Some possible reactions are rash, itching, nausea, vomiting, diarrhea, shortness of breath and swelling in your throat or other body parts.  (If severe reaction occurs such as breathing or swelling of the throat, call 911.  You need to be seen in the emergency room.)    Your last dose of pain medication was given at ____________________     Special Instructions:   1. When urinating, you may experience some burning, discomfort, increased frequency and possibly some blood in your urine.    2. Increasing the amount of fluids you drink will help dilute your urine and relieve these symptoms sooner.     3. Rest will also help to slow the bleeding down.   4. You may shower tomorrow.     Call Your Doctor if You Have Any of the Followin. Excessive pain not controlled by pain medications.   2. Temperature above 101.5ºF.   3. A feeling of a full bladder or unable to empty your bladder.   4. Thick/red urine like ketchup or large clots that are difficult to pass.      If you have any questions or concerns, please call your physician.    Physician: [x] Mariama Ritter MD Office Address: [address], [address]                    [place] [id number]        Office Phone:   [phone] or               [phone]   Home Instruction Sheet  ANESTHESIA for ADULT       What are the side effects?  Side effects depend on the medication used, and may not even be present.    Most Common Sometimes   Irritability  Poor Balance  Sleeping for Several Hours  Drowsiness  Fatigue  Difficulty Concentrating Change in Behavior  Hyperactivity  Nausea (upset stomach)  Gas (flatulence)  Dizziness  Hiccups  Constipation  Blurred Vision     The effects of sedation medicine can last up to 24 hours.  You may be drowsy or irritable for 2 to 8 hours after receiving medicine.  You may need to sleep after leaving the testing area.  Sleeping after sedation will help reduce irritability.  Diet:  Do not give anything to eat or drink until you are fully awake.  Eat a light meal and advance to a normal diet unless instructed differently:   Do not drink alcohol beverages for the next 24 hours.  Avoid greasy or spicy foods today.  Activity:  You may be dizzy and/or unsteady.  Ask for help walking, using the bathroom, or stairs to protect yourself from injury.  You should not drive a vehicle, operate machinery or power tools for 24 hours because your reflexes may be slow and your vision may be blurry.  Do not sign any legally binding documents or make important decisions for 24 hours after receiving sedation.  Medications:   Unless told otherwise, do not take any non-prescription medications (cold medicine, etc.) for 24 hours, as these medications in combination with sedation medication, can cause increased drowsiness.  If you feel that you need over the counter medication, discuss with your physician.      When Should I Call the Doctor?  Vomiting more than twice.  Extreme irritability or unusual  changes in behavior.  Trouble arousing.  Inability to urinate.  Trouble breathing - call 911.    We would like to take this opportunity to thank you. Because your confidence in your caregivers is very important to us, it is our commitment to always treat you and your family with courtesy and respect. Our goal is to always answer any questions or worries or concerns you may have about the care you received.  If you have any suggestions for improvement or worries or concerns please do not hesitate to let us know.                                                                        Unable to assess due to medical condition

## 2024-02-20 RX ORDER — METOPROLOL TARTRATE 50 MG
1 TABLET ORAL
Qty: 0 | Refills: 0 | DISCHARGE

## 2024-02-20 RX ORDER — DIVALPROEX SODIUM 500 MG/1
1 TABLET, DELAYED RELEASE ORAL
Qty: 0 | Refills: 0 | DISCHARGE

## 2024-02-20 RX ORDER — ASPIRIN/CALCIUM CARB/MAGNESIUM 324 MG
1 TABLET ORAL
Qty: 0 | Refills: 0 | DISCHARGE

## 2024-02-20 RX ORDER — RISPERIDONE 4 MG/1
1 TABLET ORAL
Qty: 0 | Refills: 0 | DISCHARGE

## 2024-02-20 RX ORDER — LEVOTHYROXINE SODIUM 125 MCG
1 TABLET ORAL
Qty: 0 | Refills: 0 | DISCHARGE

## 2024-02-20 RX ORDER — BENZTROPINE MESYLATE 1 MG
1 TABLET ORAL
Qty: 0 | Refills: 0 | DISCHARGE

## 2024-02-20 RX ORDER — AMLODIPINE BESYLATE 2.5 MG/1
1 TABLET ORAL
Qty: 0 | Refills: 0 | DISCHARGE

## 2024-02-20 RX ORDER — LISINOPRIL 2.5 MG/1
1 TABLET ORAL
Qty: 0 | Refills: 0 | DISCHARGE

## 2024-02-20 RX ORDER — FLUOXETINE HCL 10 MG
3 CAPSULE ORAL
Qty: 0 | Refills: 0 | DISCHARGE

## 2024-10-22 NOTE — BH CONSULTATION LIAISON PROGRESS NOTE - GENERAL APPEARANCE
diarrhea on admission, CTAP: + Probable proctitis, either infectious or inflammatory.  - IVF, replace electrolytes   - stool Cx , C diff, GI PCR, stool count   - c/w  cefepime
diarrhea on admission, CTAP: + Probable proctitis, either infectious or inflammatory.  - s/p IVF  RESOLVED
No deformities present/Other
No deformities present/Other
